# Patient Record
Sex: MALE | Race: BLACK OR AFRICAN AMERICAN | NOT HISPANIC OR LATINO | Employment: FULL TIME | URBAN - METROPOLITAN AREA
[De-identification: names, ages, dates, MRNs, and addresses within clinical notes are randomized per-mention and may not be internally consistent; named-entity substitution may affect disease eponyms.]

---

## 2023-02-23 LAB
ALBUMIN (MG/L) IN URINE: <7 MG/L
ALBUMIN/CREATININE (UG/MG) IN URINE: NORMAL UG/MG CRT (ref 0–30)
CREATININE (MG/DL) IN URINE: 35.5 MG/DL (ref 20–370)
CREATININE (MG/DL) IN URINE: 35.5 MG/DL (ref 20–370)
PROTEIN (MG/DL) IN URINE: <4 MG/DL (ref 5–25)
PROTEIN/CREATININE (MG/MG) IN URINE: ABNORMAL MG/MG CREAT (ref 0–0.17)

## 2023-03-06 ENCOUNTER — DOCUMENTATION (OUTPATIENT)
Dept: CARE COORDINATION | Facility: CLINIC | Age: 58
End: 2023-03-06
Payer: COMMERCIAL

## 2023-03-07 ENCOUNTER — DOCUMENTATION (OUTPATIENT)
Dept: CARE COORDINATION | Facility: CLINIC | Age: 58
End: 2023-03-07
Payer: COMMERCIAL

## 2023-03-07 ENCOUNTER — PATIENT OUTREACH (OUTPATIENT)
Dept: CARE COORDINATION | Facility: CLINIC | Age: 58
End: 2023-03-07
Payer: COMMERCIAL

## 2023-03-13 LAB
ALANINE AMINOTRANSFERASE (SGPT) (U/L) IN SER/PLAS: 19 U/L (ref 10–52)
ALBUMIN (G/DL) IN SER/PLAS: 3.6 G/DL (ref 3.4–5)
ALKALINE PHOSPHATASE (U/L) IN SER/PLAS: 65 U/L (ref 33–120)
ANION GAP IN SER/PLAS: 14 MMOL/L (ref 10–20)
ASPARTATE AMINOTRANSFERASE (SGOT) (U/L) IN SER/PLAS: 24 U/L (ref 9–39)
BASOPHILS (10*3/UL) IN BLOOD BY AUTOMATED COUNT: 0.07 X10E9/L (ref 0–0.1)
BASOPHILS/100 LEUKOCYTES IN BLOOD BY AUTOMATED COUNT: 0.8 % (ref 0–2)
BILIRUBIN TOTAL (MG/DL) IN SER/PLAS: 0.3 MG/DL (ref 0–1.2)
C REACTIVE PROTEIN (MG/L) IN SER/PLAS: 8.19 MG/DL
CALCIUM (MG/DL) IN SER/PLAS: 9.5 MG/DL (ref 8.6–10.6)
CARBON DIOXIDE, TOTAL (MMOL/L) IN SER/PLAS: 33 MMOL/L (ref 21–32)
CHLORIDE (MMOL/L) IN SER/PLAS: 96 MMOL/L (ref 98–107)
CREATININE (MG/DL) IN SER/PLAS: 1.81 MG/DL (ref 0.5–1.3)
EOSINOPHILS (10*3/UL) IN BLOOD BY AUTOMATED COUNT: 0.18 X10E9/L (ref 0–0.7)
EOSINOPHILS/100 LEUKOCYTES IN BLOOD BY AUTOMATED COUNT: 2 % (ref 0–6)
ERYTHROCYTE DISTRIBUTION WIDTH (RATIO) BY AUTOMATED COUNT: 17.8 % (ref 11.5–14.5)
ERYTHROCYTE MEAN CORPUSCULAR HEMOGLOBIN CONCENTRATION (G/DL) BY AUTOMATED: 31.3 G/DL (ref 32–36)
ERYTHROCYTE MEAN CORPUSCULAR VOLUME (FL) BY AUTOMATED COUNT: 70 FL (ref 80–100)
ERYTHROCYTES (10*6/UL) IN BLOOD BY AUTOMATED COUNT: 4.1 X10E12/L (ref 4.5–5.9)
GFR MALE: 43 ML/MIN/1.73M2
GLUCOSE (MG/DL) IN SER/PLAS: 199 MG/DL (ref 74–99)
HEMATOCRIT (%) IN BLOOD BY AUTOMATED COUNT: 28.8 % (ref 41–52)
HEMOGLOBIN (G/DL) IN BLOOD: 9 G/DL (ref 13.5–17.5)
IMMATURE GRANULOCYTES/100 LEUKOCYTES IN BLOOD BY AUTOMATED COUNT: 1 % (ref 0–0.9)
LEUKOCYTES (10*3/UL) IN BLOOD BY AUTOMATED COUNT: 9.2 X10E9/L (ref 4.4–11.3)
LYMPHOCYTES (10*3/UL) IN BLOOD BY AUTOMATED COUNT: 2.42 X10E9/L (ref 1.2–4.8)
LYMPHOCYTES/100 LEUKOCYTES IN BLOOD BY AUTOMATED COUNT: 26.4 % (ref 13–44)
MONOCYTES (10*3/UL) IN BLOOD BY AUTOMATED COUNT: 0.62 X10E9/L (ref 0.1–1)
MONOCYTES/100 LEUKOCYTES IN BLOOD BY AUTOMATED COUNT: 6.8 % (ref 2–10)
NEUTROPHILS (10*3/UL) IN BLOOD BY AUTOMATED COUNT: 5.79 X10E9/L (ref 1.2–7.7)
NEUTROPHILS/100 LEUKOCYTES IN BLOOD BY AUTOMATED COUNT: 63 % (ref 40–80)
NRBC (PER 100 WBCS) BY AUTOMATED COUNT: 0 /100 WBC (ref 0–0)
PLATELETS (10*3/UL) IN BLOOD AUTOMATED COUNT: 507 X10E9/L (ref 150–450)
POTASSIUM (MMOL/L) IN SER/PLAS: 4.9 MMOL/L (ref 3.5–5.3)
PROTEIN TOTAL: 7.7 G/DL (ref 6.4–8.2)
SEDIMENTATION RATE, ERYTHROCYTE: >130 MM/H (ref 0–20)
SODIUM (MMOL/L) IN SER/PLAS: 138 MMOL/L (ref 136–145)
UREA NITROGEN (MG/DL) IN SER/PLAS: 72 MG/DL (ref 6–23)

## 2023-03-14 ENCOUNTER — PATIENT OUTREACH (OUTPATIENT)
Dept: CARE COORDINATION | Facility: CLINIC | Age: 58
End: 2023-03-14
Payer: COMMERCIAL

## 2023-03-14 PROBLEM — R05.9 COUGH: Status: ACTIVE | Noted: 2023-03-14

## 2023-03-14 PROBLEM — H52.4 BILATERAL PRESBYOPIA: Status: ACTIVE | Noted: 2023-03-14

## 2023-03-14 PROBLEM — L03.119 CELLULITIS OF FOOT: Status: ACTIVE | Noted: 2023-03-14

## 2023-03-14 PROBLEM — M54.42 LEFT-SIDED LOW BACK PAIN WITH LEFT-SIDED SCIATICA: Status: ACTIVE | Noted: 2023-03-14

## 2023-03-14 PROBLEM — R79.89 LOW VITAMIN D LEVEL: Status: ACTIVE | Noted: 2023-03-14

## 2023-03-14 PROBLEM — L97.919 ULCER OF RIGHT LEG (MULTI): Status: ACTIVE | Noted: 2023-03-14

## 2023-03-14 PROBLEM — E11.36 CATARACT ASSOCIATED WITH TYPE 2 DIABETES MELLITUS (MULTI): Status: ACTIVE | Noted: 2023-03-14

## 2023-03-14 PROBLEM — D57.3 SICKLE CELL TRAIT (CMS-HCC): Status: ACTIVE | Noted: 2023-03-14

## 2023-03-14 PROBLEM — E11.319 DIABETIC RETINOPATHY (MULTI): Status: ACTIVE | Noted: 2023-03-14

## 2023-03-14 PROBLEM — E78.6 LOW HDL (UNDER 40): Status: ACTIVE | Noted: 2023-03-14

## 2023-03-14 PROBLEM — I10 HTN (HYPERTENSION), BENIGN: Status: ACTIVE | Noted: 2023-03-14

## 2023-03-14 PROBLEM — I10 UNCONTROLLED HYPERTENSION: Status: ACTIVE | Noted: 2023-03-14

## 2023-03-14 PROBLEM — H52.203 ASTIGMATISM, BILATERAL: Status: ACTIVE | Noted: 2023-03-14

## 2023-03-14 PROBLEM — M86.9: Status: ACTIVE | Noted: 2023-03-14

## 2023-03-14 PROBLEM — R79.89 PRERENAL AZOTEMIA: Status: ACTIVE | Noted: 2023-03-14

## 2023-03-14 PROBLEM — J34.89 NASAL CONGESTION WITH RHINORRHEA: Status: ACTIVE | Noted: 2023-03-14

## 2023-03-14 PROBLEM — E78.5 HYPERLIPIDEMIA: Status: ACTIVE | Noted: 2023-03-14

## 2023-03-14 PROBLEM — B35.1 ONYCHOMYCOSIS OF TOENAIL: Status: ACTIVE | Noted: 2023-03-14

## 2023-03-14 PROBLEM — K43.2 INCISIONAL HERNIA: Status: ACTIVE | Noted: 2023-03-14

## 2023-03-14 PROBLEM — L89.899: Status: ACTIVE | Noted: 2023-03-14

## 2023-03-14 PROBLEM — E11.9 DIABETES MELLITUS TYPE 2 WITHOUT RETINOPATHY (MULTI): Status: ACTIVE | Noted: 2023-03-14

## 2023-03-14 PROBLEM — I73.9 PERIPHERAL VASCULAR DISEASE (CMS-HCC): Status: ACTIVE | Noted: 2023-03-14

## 2023-03-14 PROBLEM — Z86.39 HISTORY OF MORBID OBESITY: Status: ACTIVE | Noted: 2023-03-14

## 2023-03-14 PROBLEM — M10.9 GOUT: Status: ACTIVE | Noted: 2023-03-14

## 2023-03-14 PROBLEM — R09.81 NASAL CONGESTION WITH RHINORRHEA: Status: ACTIVE | Noted: 2023-03-14

## 2023-03-14 PROBLEM — H25.13 NUCLEAR SCLEROSIS OF BOTH EYES: Status: ACTIVE | Noted: 2023-03-14

## 2023-03-14 PROBLEM — I10 UNCONTROLLED HYPERTENSION: Status: RESOLVED | Noted: 2023-03-14 | Resolved: 2023-03-14

## 2023-03-14 PROBLEM — R79.89 ELEVATED PARATHYROID HORMONE: Status: ACTIVE | Noted: 2023-03-14

## 2023-03-14 PROBLEM — G47.33 OBSTRUCTIVE SLEEP APNEA: Status: ACTIVE | Noted: 2023-03-14

## 2023-03-14 PROBLEM — M19.90 ARTHRITIS: Status: ACTIVE | Noted: 2023-03-14

## 2023-03-14 PROBLEM — I50.30 (HFPEF) HEART FAILURE WITH PRESERVED EJECTION FRACTION (MULTI): Status: ACTIVE | Noted: 2023-03-14

## 2023-03-14 PROBLEM — N18.30 STAGE III CHRONIC KIDNEY DISEASE (MULTI): Status: ACTIVE | Noted: 2023-03-14

## 2023-03-14 PROBLEM — H52.7 REFRACTION ERROR: Status: ACTIVE | Noted: 2023-03-14

## 2023-03-14 RX ORDER — ISOSORBIDE DINITRATE 10 MG/1
10 TABLET ORAL 2 TIMES DAILY
COMMUNITY
Start: 2018-05-30 | End: 2023-12-07 | Stop reason: SDUPTHER

## 2023-03-14 RX ORDER — BUMETANIDE 2 MG/1
2 TABLET ORAL 2 TIMES DAILY
COMMUNITY
End: 2023-06-15 | Stop reason: ALTCHOICE

## 2023-03-14 RX ORDER — DAPAGLIFLOZIN 10 MG/1
1 TABLET, FILM COATED ORAL EVERY MORNING
COMMUNITY
End: 2023-06-15 | Stop reason: ALTCHOICE

## 2023-03-14 RX ORDER — INSULIN DEGLUDEC 100 U/ML
20 INJECTION, SOLUTION SUBCUTANEOUS DAILY
COMMUNITY
End: 2023-10-09 | Stop reason: ALTCHOICE

## 2023-03-14 RX ORDER — LANCETS
EACH MISCELLANEOUS
COMMUNITY
End: 2023-12-07 | Stop reason: SDUPTHER

## 2023-03-14 RX ORDER — IBUPROFEN 100 MG/5ML
1000 SUSPENSION, ORAL (FINAL DOSE FORM) ORAL DAILY
COMMUNITY

## 2023-03-14 RX ORDER — PEN NEEDLE, DIABETIC 30 GX3/16"
NEEDLE, DISPOSABLE MISCELLANEOUS
COMMUNITY
End: 2023-10-09 | Stop reason: ALTCHOICE

## 2023-03-14 RX ORDER — GABAPENTIN 100 MG/1
100 CAPSULE ORAL 3 TIMES DAILY
COMMUNITY
End: 2023-12-07 | Stop reason: ALTCHOICE

## 2023-03-14 RX ORDER — LISINOPRIL 40 MG/1
1 TABLET ORAL 2 TIMES DAILY
COMMUNITY
Start: 2014-06-05 | End: 2023-06-15 | Stop reason: ALTCHOICE

## 2023-03-14 RX ORDER — CHLORTHALIDONE 25 MG/1
25 TABLET ORAL DAILY
COMMUNITY
Start: 2021-08-20 | End: 2023-06-15 | Stop reason: ALTCHOICE

## 2023-03-14 RX ORDER — HYDRALAZINE HYDROCHLORIDE 100 MG/1
1 TABLET, FILM COATED ORAL 2 TIMES DAILY
COMMUNITY
Start: 2021-10-06 | End: 2023-06-15 | Stop reason: ALTCHOICE

## 2023-03-14 RX ORDER — GLYBURIDE 5 MG/1
10 TABLET ORAL
COMMUNITY
Start: 2014-01-05 | End: 2023-12-07 | Stop reason: SDUPTHER

## 2023-03-14 RX ORDER — ACETAMINOPHEN 500 MG
50 TABLET ORAL DAILY
COMMUNITY
Start: 2022-03-10 | End: 2023-06-15 | Stop reason: ALTCHOICE

## 2023-03-14 RX ORDER — DULAGLUTIDE 1.5 MG/.5ML
INJECTION, SOLUTION SUBCUTANEOUS
COMMUNITY
Start: 2019-08-28 | End: 2023-06-15 | Stop reason: ALTCHOICE

## 2023-03-14 RX ORDER — BLOOD SUGAR DIAGNOSTIC
STRIP MISCELLANEOUS
COMMUNITY
Start: 2019-08-27 | End: 2023-12-07 | Stop reason: SDUPTHER

## 2023-03-14 RX ORDER — ALLOPURINOL 300 MG/1
300 TABLET ORAL DAILY
COMMUNITY
End: 2023-06-15 | Stop reason: ALTCHOICE

## 2023-03-14 RX ORDER — FERROUS SULFATE 325(65) MG
3 TABLET ORAL DAILY
COMMUNITY
Start: 2016-10-18 | End: 2023-06-15 | Stop reason: ALTCHOICE

## 2023-03-14 RX ORDER — AMLODIPINE BESYLATE 10 MG/1
10 TABLET ORAL 2 TIMES DAILY
COMMUNITY
End: 2023-06-15 | Stop reason: ALTCHOICE

## 2023-03-15 ENCOUNTER — PATIENT OUTREACH (OUTPATIENT)
Dept: CARE COORDINATION | Facility: CLINIC | Age: 58
End: 2023-03-15
Payer: COMMERCIAL

## 2023-03-16 ENCOUNTER — OFFICE VISIT (OUTPATIENT)
Dept: PRIMARY CARE | Facility: CLINIC | Age: 58
End: 2023-03-16
Payer: COMMERCIAL

## 2023-03-16 VITALS
HEART RATE: 62 BPM | SYSTOLIC BLOOD PRESSURE: 120 MMHG | HEIGHT: 72 IN | WEIGHT: 315 LBS | DIASTOLIC BLOOD PRESSURE: 74 MMHG | OXYGEN SATURATION: 97 % | BODY MASS INDEX: 42.66 KG/M2

## 2023-03-16 DIAGNOSIS — M86.9 OSTEOMYELITIS OF LEFT FOOT, UNSPECIFIED TYPE (MULTI): ICD-10-CM

## 2023-03-16 DIAGNOSIS — N17.9 AKI (ACUTE KIDNEY INJURY) (CMS-HCC): ICD-10-CM

## 2023-03-16 DIAGNOSIS — I50.32 CHRONIC HEART FAILURE WITH PRESERVED EJECTION FRACTION (MULTI): ICD-10-CM

## 2023-03-16 DIAGNOSIS — R29.898 WEAKNESS OF BOTH LOWER EXTREMITIES: Primary | ICD-10-CM

## 2023-03-16 DIAGNOSIS — E11.9 DIABETES MELLITUS TYPE 2 WITHOUT RETINOPATHY (MULTI): ICD-10-CM

## 2023-03-16 PROCEDURE — 3078F DIAST BP <80 MM HG: CPT | Performed by: STUDENT IN AN ORGANIZED HEALTH CARE EDUCATION/TRAINING PROGRAM

## 2023-03-16 PROCEDURE — 4010F ACE/ARB THERAPY RXD/TAKEN: CPT | Performed by: STUDENT IN AN ORGANIZED HEALTH CARE EDUCATION/TRAINING PROGRAM

## 2023-03-16 PROCEDURE — 99495 TRANSJ CARE MGMT MOD F2F 14D: CPT | Performed by: STUDENT IN AN ORGANIZED HEALTH CARE EDUCATION/TRAINING PROGRAM

## 2023-03-16 PROCEDURE — 3066F NEPHROPATHY DOC TX: CPT | Performed by: STUDENT IN AN ORGANIZED HEALTH CARE EDUCATION/TRAINING PROGRAM

## 2023-03-16 PROCEDURE — 3046F HEMOGLOBIN A1C LEVEL >9.0%: CPT | Performed by: STUDENT IN AN ORGANIZED HEALTH CARE EDUCATION/TRAINING PROGRAM

## 2023-03-16 PROCEDURE — 1036F TOBACCO NON-USER: CPT | Performed by: STUDENT IN AN ORGANIZED HEALTH CARE EDUCATION/TRAINING PROGRAM

## 2023-03-16 PROCEDURE — 3008F BODY MASS INDEX DOCD: CPT | Performed by: STUDENT IN AN ORGANIZED HEALTH CARE EDUCATION/TRAINING PROGRAM

## 2023-03-16 PROCEDURE — 3074F SYST BP LT 130 MM HG: CPT | Performed by: STUDENT IN AN ORGANIZED HEALTH CARE EDUCATION/TRAINING PROGRAM

## 2023-03-16 ASSESSMENT — PATIENT HEALTH QUESTIONNAIRE - PHQ9
SUM OF ALL RESPONSES TO PHQ9 QUESTIONS 1 AND 2: 0
2. FEELING DOWN, DEPRESSED OR HOPELESS: NOT AT ALL
1. LITTLE INTEREST OR PLEASURE IN DOING THINGS: NOT AT ALL

## 2023-03-16 ASSESSMENT — ENCOUNTER SYMPTOMS
LOSS OF SENSATION IN FEET: 0
OCCASIONAL FEELINGS OF UNSTEADINESS: 0
DEPRESSION: 0

## 2023-03-16 ASSESSMENT — PAIN SCALES - GENERAL: PAINLEVEL: 10-WORST PAIN EVER

## 2023-03-16 NOTE — PROGRESS NOTES
"57-year-old male presenting for hospital follow-up.  Admission date 2/24/2023, discharge 3/5/2023.    \"Hospital Course:   Patient is a 57-year-old gentleman presented to the hospital on February 24   with concerns of left foot diabetic infection.  Patient was admitted to the   hospital started on empiric IV antibiotic therapy patient was found to have   left diabetic foot ulcer with cellulitis with group B strep along with left   calcaneal osteomyelitis infectious disease and podiatry were both on consult.   Patient's blood cultures also came back positive from admission on February 24   with group B strep source being the foot.  Patient's repeat blood cultures have   been negative to date.  Patient had a krystal catheter placed on March 3 for IV   antibiotic therapy patient is being discharged on IV ceftriaxone 2 g to 24   hours until April 11, will have weekly lab work as recommended by infectious   disease and they will follow-up with the results.  On March 2 and had incision   and drainage and bone biopsy of the left heel.  Patient will follow-up in the   wound care center in 1 week as instructed by podiatry.  In regards to patient's   uncontrolled diabetes endocrinology was on board, patient's Tresiba dose was   increased to 44 units at bedtime, along with lispro 10 units 3 times daily AC,   along with addition of sliding scale insulin at home.  Patient can follow-up   with endocrinology he is to also go back on his home dose of Trulicity.   Patient was stable at the time of discharge instructions were provided to the   wife at bedside as well.  Was also seen by cardiology patient had TROY on CKD 3   as well as compensated diastolic heart failure, they did add metoprolol.  For   the time being we did discontinue patient's lisinopril can be resumed as an   outpatient per the PCP.  Patient was stable at the time of discharge. \"      Patient having continued weakness in lower extremities.  Has not had PT/OT follow-up. "  Not having systemic symptoms, not feeling feverish.  Stable with catheter and IV antibiotics.  Reports not having made ID appointment in outpatient, but would like to.  Has not made endocrinology follow-up appointment at this time, but plans to, as he is established.  Patient is taking insulin regimen as started in hospital, home blood sugar readings mostly between 120 and 220.  Tolerating regimen well.    General: Alert, oriented, pleasant, in no acute distress  HEENT:      Head: normocephalic, atraumatic;      eyes: EOMI, no scleral icterus;   CV: Heart with regular rate and rhythm, normal S1/S2, no murmurs  Lungs: CTAB without wheezing, rhonchi or rales; good respiratory effort, no increased work of breathing  Abdomen: soft, non-tender, non-distended, no masses appreciated  Extremities: no edema, no cyanosis  Neuro: Cranial nerves grossly intact; alert and oriented  Psych: Appropriate mood and affect    1.  Osteomyelitis  -Vital signs stable  -Continue to follow with wound care/podiatry, help to make an appointment with ID, continue IV antibiotics    2.  DMII  -Continue current basal bolus insulin regimen  -Continue with endocrinology follow-up in the next 2 weeks    3.  TROY  -Repeat renal function    Temporary disability forms filled out    Follow-up 12 weeks    Christopher D'Amico, DO

## 2023-03-17 ENCOUNTER — TELEPHONE (OUTPATIENT)
Dept: PRIMARY CARE | Facility: CLINIC | Age: 58
End: 2023-03-17
Payer: COMMERCIAL

## 2023-03-17 DIAGNOSIS — R29.898 WEAKNESS OF BOTH LOWER EXTREMITIES: Primary | ICD-10-CM

## 2023-03-21 LAB
BASOPHILS (10*3/UL) IN BLOOD BY AUTOMATED COUNT: 0.05 X10E9/L (ref 0–0.1)
BASOPHILS/100 LEUKOCYTES IN BLOOD BY AUTOMATED COUNT: 0.5 % (ref 0–2)
C REACTIVE PROTEIN (MG/L) IN SER/PLAS: 2.43 MG/DL
EOSINOPHILS (10*3/UL) IN BLOOD BY AUTOMATED COUNT: 0.25 X10E9/L (ref 0–0.7)
EOSINOPHILS/100 LEUKOCYTES IN BLOOD BY AUTOMATED COUNT: 2.7 % (ref 0–6)
ERYTHROCYTE DISTRIBUTION WIDTH (RATIO) BY AUTOMATED COUNT: 18 % (ref 11.5–14.5)
ERYTHROCYTE MEAN CORPUSCULAR HEMOGLOBIN CONCENTRATION (G/DL) BY AUTOMATED: 31 G/DL (ref 32–36)
ERYTHROCYTE MEAN CORPUSCULAR VOLUME (FL) BY AUTOMATED COUNT: 72 FL (ref 80–100)
ERYTHROCYTES (10*6/UL) IN BLOOD BY AUTOMATED COUNT: 4.31 X10E12/L (ref 4.5–5.9)
HEMATOCRIT (%) IN BLOOD BY AUTOMATED COUNT: 31 % (ref 41–52)
HEMOGLOBIN (G/DL) IN BLOOD: 9.6 G/DL (ref 13.5–17.5)
IMMATURE GRANULOCYTES/100 LEUKOCYTES IN BLOOD BY AUTOMATED COUNT: 0.4 % (ref 0–0.9)
LEUKOCYTES (10*3/UL) IN BLOOD BY AUTOMATED COUNT: 9.3 X10E9/L (ref 4.4–11.3)
LYMPHOCYTES (10*3/UL) IN BLOOD BY AUTOMATED COUNT: 1.91 X10E9/L (ref 1.2–4.8)
LYMPHOCYTES/100 LEUKOCYTES IN BLOOD BY AUTOMATED COUNT: 20.5 % (ref 13–44)
MONOCYTES (10*3/UL) IN BLOOD BY AUTOMATED COUNT: 0.68 X10E9/L (ref 0.1–1)
MONOCYTES/100 LEUKOCYTES IN BLOOD BY AUTOMATED COUNT: 7.3 % (ref 2–10)
NEUTROPHILS (10*3/UL) IN BLOOD BY AUTOMATED COUNT: 6.4 X10E9/L (ref 1.2–7.7)
NEUTROPHILS/100 LEUKOCYTES IN BLOOD BY AUTOMATED COUNT: 68.6 % (ref 40–80)
NRBC (PER 100 WBCS) BY AUTOMATED COUNT: 0 /100 WBC (ref 0–0)
PLATELETS (10*3/UL) IN BLOOD AUTOMATED COUNT: 408 X10E9/L (ref 150–450)
SEDIMENTATION RATE, ERYTHROCYTE: >130 MM/H (ref 0–20)

## 2023-03-27 LAB
ALANINE AMINOTRANSFERASE (SGPT) (U/L) IN SER/PLAS: 9 U/L (ref 10–52)
ALBUMIN (G/DL) IN SER/PLAS: 3.8 G/DL (ref 3.4–5)
ALKALINE PHOSPHATASE (U/L) IN SER/PLAS: 58 U/L (ref 33–120)
ANION GAP IN SER/PLAS: 16 MMOL/L (ref 10–20)
ASPARTATE AMINOTRANSFERASE (SGOT) (U/L) IN SER/PLAS: 15 U/L (ref 9–39)
BASOPHILS (10*3/UL) IN BLOOD BY AUTOMATED COUNT: 0.05 X10E9/L (ref 0–0.1)
BASOPHILS/100 LEUKOCYTES IN BLOOD BY AUTOMATED COUNT: 0.6 % (ref 0–2)
BILIRUBIN TOTAL (MG/DL) IN SER/PLAS: 0.4 MG/DL (ref 0–1.2)
C REACTIVE PROTEIN (MG/L) IN SER/PLAS: 3.93 MG/DL
CALCIUM (MG/DL) IN SER/PLAS: 8.7 MG/DL (ref 8.6–10.3)
CARBON DIOXIDE, TOTAL (MMOL/L) IN SER/PLAS: 29 MMOL/L (ref 21–32)
CHLORIDE (MMOL/L) IN SER/PLAS: 99 MMOL/L (ref 98–107)
CREATININE (MG/DL) IN SER/PLAS: 1.55 MG/DL (ref 0.5–1.3)
EOSINOPHILS (10*3/UL) IN BLOOD BY AUTOMATED COUNT: 0.58 X10E9/L (ref 0–0.7)
EOSINOPHILS/100 LEUKOCYTES IN BLOOD BY AUTOMATED COUNT: 6.6 % (ref 0–6)
ERYTHROCYTE DISTRIBUTION WIDTH (RATIO) BY AUTOMATED COUNT: 18.4 % (ref 11.5–14.5)
ERYTHROCYTE MEAN CORPUSCULAR HEMOGLOBIN CONCENTRATION (G/DL) BY AUTOMATED: 30.7 G/DL (ref 32–36)
ERYTHROCYTE MEAN CORPUSCULAR VOLUME (FL) BY AUTOMATED COUNT: 71 FL (ref 80–100)
ERYTHROCYTES (10*6/UL) IN BLOOD BY AUTOMATED COUNT: 4.47 X10E12/L (ref 4.5–5.9)
GFR MALE: 52 ML/MIN/1.73M2
GLUCOSE (MG/DL) IN SER/PLAS: 184 MG/DL (ref 74–99)
HEMATOCRIT (%) IN BLOOD BY AUTOMATED COUNT: 31.9 % (ref 41–52)
HEMOGLOBIN (G/DL) IN BLOOD: 9.8 G/DL (ref 13.5–17.5)
IMMATURE GRANULOCYTES/100 LEUKOCYTES IN BLOOD BY AUTOMATED COUNT: 0.5 % (ref 0–0.9)
LEUKOCYTES (10*3/UL) IN BLOOD BY AUTOMATED COUNT: 8.8 X10E9/L (ref 4.4–11.3)
LYMPHOCYTES (10*3/UL) IN BLOOD BY AUTOMATED COUNT: 1.97 X10E9/L (ref 1.2–4.8)
LYMPHOCYTES/100 LEUKOCYTES IN BLOOD BY AUTOMATED COUNT: 22.3 % (ref 13–44)
MONOCYTES (10*3/UL) IN BLOOD BY AUTOMATED COUNT: 0.69 X10E9/L (ref 0.1–1)
MONOCYTES/100 LEUKOCYTES IN BLOOD BY AUTOMATED COUNT: 7.8 % (ref 2–10)
NEUTROPHILS (10*3/UL) IN BLOOD BY AUTOMATED COUNT: 5.5 X10E9/L (ref 1.2–7.7)
NEUTROPHILS/100 LEUKOCYTES IN BLOOD BY AUTOMATED COUNT: 62.2 % (ref 40–80)
PLATELETS (10*3/UL) IN BLOOD AUTOMATED COUNT: 383 X10E9/L (ref 150–450)
POTASSIUM (MMOL/L) IN SER/PLAS: 4.3 MMOL/L (ref 3.5–5.3)
PROTEIN TOTAL: 7.5 G/DL (ref 6.4–8.2)
SEDIMENTATION RATE, ERYTHROCYTE: >130 MM/H (ref 0–20)
SODIUM (MMOL/L) IN SER/PLAS: 140 MMOL/L (ref 136–145)
UREA NITROGEN (MG/DL) IN SER/PLAS: 49 MG/DL (ref 6–23)

## 2023-04-04 LAB
BASOPHILS (10*3/UL) IN BLOOD BY AUTOMATED COUNT: 0.07 X10E9/L (ref 0–0.1)
BASOPHILS/100 LEUKOCYTES IN BLOOD BY AUTOMATED COUNT: 0.7 % (ref 0–2)
C REACTIVE PROTEIN (MG/L) IN SER/PLAS: 4.06 MG/DL
EOSINOPHILS (10*3/UL) IN BLOOD BY AUTOMATED COUNT: 0.36 X10E9/L (ref 0–0.7)
EOSINOPHILS/100 LEUKOCYTES IN BLOOD BY AUTOMATED COUNT: 3.7 % (ref 0–6)
ERYTHROCYTE DISTRIBUTION WIDTH (RATIO) BY AUTOMATED COUNT: 18.7 % (ref 11.5–14.5)
ERYTHROCYTE MEAN CORPUSCULAR HEMOGLOBIN CONCENTRATION (G/DL) BY AUTOMATED: 31.2 G/DL (ref 32–36)
ERYTHROCYTE MEAN CORPUSCULAR VOLUME (FL) BY AUTOMATED COUNT: 71 FL (ref 80–100)
ERYTHROCYTES (10*6/UL) IN BLOOD BY AUTOMATED COUNT: 4.45 X10E12/L (ref 4.5–5.9)
HEMATOCRIT (%) IN BLOOD BY AUTOMATED COUNT: 31.4 % (ref 41–52)
HEMOGLOBIN (G/DL) IN BLOOD: 9.8 G/DL (ref 13.5–17.5)
IMMATURE GRANULOCYTES/100 LEUKOCYTES IN BLOOD BY AUTOMATED COUNT: 0.6 % (ref 0–0.9)
LEUKOCYTES (10*3/UL) IN BLOOD BY AUTOMATED COUNT: 9.8 X10E9/L (ref 4.4–11.3)
LYMPHOCYTES (10*3/UL) IN BLOOD BY AUTOMATED COUNT: 2.22 X10E9/L (ref 1.2–4.8)
LYMPHOCYTES/100 LEUKOCYTES IN BLOOD BY AUTOMATED COUNT: 22.7 % (ref 13–44)
MONOCYTES (10*3/UL) IN BLOOD BY AUTOMATED COUNT: 0.77 X10E9/L (ref 0.1–1)
MONOCYTES/100 LEUKOCYTES IN BLOOD BY AUTOMATED COUNT: 7.9 % (ref 2–10)
NEUTROPHILS (10*3/UL) IN BLOOD BY AUTOMATED COUNT: 6.29 X10E9/L (ref 1.2–7.7)
NEUTROPHILS/100 LEUKOCYTES IN BLOOD BY AUTOMATED COUNT: 64.4 % (ref 40–80)
NRBC (PER 100 WBCS) BY AUTOMATED COUNT: 0 /100 WBC (ref 0–0)
PLATELETS (10*3/UL) IN BLOOD AUTOMATED COUNT: 412 X10E9/L (ref 150–450)
SEDIMENTATION RATE, ERYTHROCYTE: >130 MM/H (ref 0–20)

## 2023-04-11 LAB
ALANINE AMINOTRANSFERASE (SGPT) (U/L) IN SER/PLAS: 10 U/L (ref 10–52)
ALBUMIN (G/DL) IN SER/PLAS: 4 G/DL (ref 3.4–5)
ALKALINE PHOSPHATASE (U/L) IN SER/PLAS: 61 U/L (ref 33–120)
ANION GAP IN SER/PLAS: 16 MMOL/L (ref 10–20)
ASPARTATE AMINOTRANSFERASE (SGOT) (U/L) IN SER/PLAS: 14 U/L (ref 9–39)
BASOPHILS (10*3/UL) IN BLOOD BY AUTOMATED COUNT: 0.07 X10E9/L (ref 0–0.1)
BASOPHILS/100 LEUKOCYTES IN BLOOD BY AUTOMATED COUNT: 0.7 % (ref 0–2)
BILIRUBIN TOTAL (MG/DL) IN SER/PLAS: 0.4 MG/DL (ref 0–1.2)
C REACTIVE PROTEIN (MG/L) IN SER/PLAS: 3.32 MG/DL
CALCIUM (MG/DL) IN SER/PLAS: 9.4 MG/DL (ref 8.6–10.6)
CARBON DIOXIDE, TOTAL (MMOL/L) IN SER/PLAS: 28 MMOL/L (ref 21–32)
CHLORIDE (MMOL/L) IN SER/PLAS: 100 MMOL/L (ref 98–107)
CREATININE (MG/DL) IN SER/PLAS: 1.65 MG/DL (ref 0.5–1.3)
EOSINOPHILS (10*3/UL) IN BLOOD BY AUTOMATED COUNT: 0.3 X10E9/L (ref 0–0.7)
EOSINOPHILS/100 LEUKOCYTES IN BLOOD BY AUTOMATED COUNT: 3 % (ref 0–6)
ERYTHROCYTE DISTRIBUTION WIDTH (RATIO) BY AUTOMATED COUNT: 18.6 % (ref 11.5–14.5)
ERYTHROCYTE MEAN CORPUSCULAR HEMOGLOBIN CONCENTRATION (G/DL) BY AUTOMATED: 31.6 G/DL (ref 32–36)
ERYTHROCYTE MEAN CORPUSCULAR VOLUME (FL) BY AUTOMATED COUNT: 70 FL (ref 80–100)
ERYTHROCYTES (10*6/UL) IN BLOOD BY AUTOMATED COUNT: 4.55 X10E12/L (ref 4.5–5.9)
GFR MALE: 48 ML/MIN/1.73M2
GLUCOSE (MG/DL) IN SER/PLAS: 138 MG/DL (ref 74–99)
HEMATOCRIT (%) IN BLOOD BY AUTOMATED COUNT: 32 % (ref 41–52)
HEMOGLOBIN (G/DL) IN BLOOD: 10.1 G/DL (ref 13.5–17.5)
IMMATURE GRANULOCYTES/100 LEUKOCYTES IN BLOOD BY AUTOMATED COUNT: 0.8 % (ref 0–0.9)
LEUKOCYTES (10*3/UL) IN BLOOD BY AUTOMATED COUNT: 10.1 X10E9/L (ref 4.4–11.3)
LYMPHOCYTES (10*3/UL) IN BLOOD BY AUTOMATED COUNT: 2.12 X10E9/L (ref 1.2–4.8)
LYMPHOCYTES/100 LEUKOCYTES IN BLOOD BY AUTOMATED COUNT: 20.9 % (ref 13–44)
MONOCYTES (10*3/UL) IN BLOOD BY AUTOMATED COUNT: 0.79 X10E9/L (ref 0.1–1)
MONOCYTES/100 LEUKOCYTES IN BLOOD BY AUTOMATED COUNT: 7.8 % (ref 2–10)
NEUTROPHILS (10*3/UL) IN BLOOD BY AUTOMATED COUNT: 6.76 X10E9/L (ref 1.2–7.7)
NEUTROPHILS/100 LEUKOCYTES IN BLOOD BY AUTOMATED COUNT: 66.8 % (ref 40–80)
NRBC (PER 100 WBCS) BY AUTOMATED COUNT: 0 /100 WBC (ref 0–0)
PLATELETS (10*3/UL) IN BLOOD AUTOMATED COUNT: 355 X10E9/L (ref 150–450)
POTASSIUM (MMOL/L) IN SER/PLAS: 5.5 MMOL/L (ref 3.5–5.3)
PROTEIN TOTAL: 7.9 G/DL (ref 6.4–8.2)
SEDIMENTATION RATE, ERYTHROCYTE: 122 MM/H (ref 0–20)
SODIUM (MMOL/L) IN SER/PLAS: 138 MMOL/L (ref 136–145)
UREA NITROGEN (MG/DL) IN SER/PLAS: 64 MG/DL (ref 6–23)

## 2023-04-12 ENCOUNTER — TELEPHONE (OUTPATIENT)
Dept: PRIMARY CARE | Facility: CLINIC | Age: 58
End: 2023-04-12
Payer: COMMERCIAL

## 2023-04-12 ENCOUNTER — HOSPITAL ENCOUNTER (OUTPATIENT)
Dept: DATA CONVERSION | Facility: HOSPITAL | Age: 58
End: 2023-04-12
Attending: RADIOLOGY | Admitting: RADIOLOGY
Payer: COMMERCIAL

## 2023-04-12 DIAGNOSIS — M86.172 OTHER ACUTE OSTEOMYELITIS, LEFT ANKLE AND FOOT (MULTI): ICD-10-CM

## 2023-04-12 DIAGNOSIS — Z79.4 LONG TERM (CURRENT) USE OF INSULIN (MULTI): ICD-10-CM

## 2023-04-12 DIAGNOSIS — Z79.85 LONG-TERM (CURRENT) USE OF INJECTABLE NON-INSULIN ANTIDIABETIC DRUGS: ICD-10-CM

## 2023-04-12 DIAGNOSIS — Z79.84 LONG TERM (CURRENT) USE OF ORAL HYPOGLYCEMIC DRUGS: ICD-10-CM

## 2023-04-12 DIAGNOSIS — Z45.2 ENCOUNTER FOR ADJUSTMENT AND MANAGEMENT OF VASCULAR ACCESS DEVICE: ICD-10-CM

## 2023-04-13 ENCOUNTER — PATIENT OUTREACH (OUTPATIENT)
Dept: CARE COORDINATION | Facility: CLINIC | Age: 58
End: 2023-04-13
Payer: COMMERCIAL

## 2023-04-13 DIAGNOSIS — M54.40 LOW BACK PAIN WITH SCIATICA, SCIATICA LATERALITY UNSPECIFIED, UNSPECIFIED BACK PAIN LATERALITY, UNSPECIFIED CHRONICITY: ICD-10-CM

## 2023-04-13 DIAGNOSIS — M54.42 LEFT-SIDED LOW BACK PAIN WITH LEFT-SIDED SCIATICA, UNSPECIFIED CHRONICITY: Primary | ICD-10-CM

## 2023-04-17 ENCOUNTER — DOCUMENTATION (OUTPATIENT)
Dept: PRIMARY CARE | Facility: CLINIC | Age: 58
End: 2023-04-17
Payer: COMMERCIAL

## 2023-04-24 ENCOUNTER — LAB (OUTPATIENT)
Dept: LAB | Facility: LAB | Age: 58
End: 2023-04-24
Payer: COMMERCIAL

## 2023-04-24 LAB — TOBACCO SCREEN, URINE: NEGATIVE

## 2023-05-16 ENCOUNTER — PATIENT OUTREACH (OUTPATIENT)
Dept: CARE COORDINATION | Facility: CLINIC | Age: 58
End: 2023-05-16
Payer: COMMERCIAL

## 2023-05-17 ENCOUNTER — PATIENT OUTREACH (OUTPATIENT)
Dept: CARE COORDINATION | Facility: CLINIC | Age: 58
End: 2023-05-17
Payer: COMMERCIAL

## 2023-06-12 PROBLEM — E11.9 TYPE 2 DIABETES MELLITUS (MULTI): Status: ACTIVE | Noted: 2023-02-24

## 2023-06-12 PROBLEM — U07.1 COVID-19: Status: ACTIVE | Noted: 2023-06-12

## 2023-06-12 PROBLEM — M54.50 LOW BACK PAIN: Status: ACTIVE | Noted: 2023-02-24

## 2023-06-12 PROBLEM — Z79.4 INSULIN LONG-TERM USE (MULTI): Status: ACTIVE | Noted: 2023-02-24

## 2023-06-12 PROBLEM — R69 SICK: Status: ACTIVE | Noted: 2023-06-12

## 2023-06-12 PROBLEM — E11.29 TYPE 2 DIABETES MELLITUS WITH RENAL COMPLICATION (MULTI): Status: ACTIVE | Noted: 2023-02-24

## 2023-06-12 PROBLEM — E66.01 MORBID OBESITY DUE TO EXCESS CALORIES (MULTI): Status: ACTIVE | Noted: 2023-02-24

## 2023-06-12 PROBLEM — L97.429: Status: ACTIVE | Noted: 2023-02-24

## 2023-06-12 PROBLEM — Z87.891 PERSONAL HISTORY OF NICOTINE DEPENDENCE: Status: ACTIVE | Noted: 2023-02-24

## 2023-06-12 PROBLEM — G47.33 OBSTRUCTIVE SLEEP APNEA (ADULT) (PEDIATRIC): Status: ACTIVE | Noted: 2023-02-24

## 2023-06-12 PROBLEM — M86.9 OSTEOMYELITIS (MULTI): Status: ACTIVE | Noted: 2023-02-24

## 2023-06-12 PROBLEM — L03.116 CELLULITIS OF LEFT LOWER LIMB: Status: ACTIVE | Noted: 2023-02-24

## 2023-06-12 PROBLEM — N18.30 STAGE 3 CHRONIC KIDNEY DISEASE (MULTI): Status: ACTIVE | Noted: 2023-02-24

## 2023-06-12 PROBLEM — Z45.2 ENCOUNTER FOR ADJUSTMENT AND MANAGEMENT OF VASCULAR ACCESS DEVICE: Status: ACTIVE | Noted: 2023-02-24

## 2023-06-12 PROBLEM — E11.69 TYPE 2 DIABETES MELLITUS WITH OTHER SPECIFIED COMPLICATION (MULTI): Status: ACTIVE | Noted: 2023-06-12

## 2023-06-12 PROBLEM — G89.29 OTHER CHRONIC PAIN: Status: ACTIVE | Noted: 2023-02-24

## 2023-06-12 PROBLEM — M86.172 ACUTE OSTEOMYELITIS OF LEFT CALCANEUS (MULTI): Status: ACTIVE | Noted: 2023-04-04

## 2023-06-12 PROBLEM — E11.9 DIABETES MELLITUS (MULTI): Status: ACTIVE | Noted: 2023-02-24

## 2023-06-12 PROBLEM — E78.5 HYPERLIPIDEMIA, UNSPECIFIED: Status: ACTIVE | Noted: 2023-02-24

## 2023-06-12 PROBLEM — M62.81 MUSCLE WEAKNESS (GENERALIZED): Status: ACTIVE | Noted: 2023-02-24

## 2023-06-12 PROBLEM — A49.1 STREPTOCOCCUS INFECTION, GROUP B: Status: ACTIVE | Noted: 2023-02-24

## 2023-06-12 RX ORDER — GLYBURIDE 5 MG/1
1 TABLET ORAL DAILY
COMMUNITY
Start: 2004-06-07 | End: 2023-06-15 | Stop reason: ALTCHOICE

## 2023-06-12 RX ORDER — MULTIVIT-MIN/IRON FUM/FOLIC AC 7.5 MG-4
1 TABLET ORAL DAILY
COMMUNITY
End: 2023-12-07 | Stop reason: SDUPTHER

## 2023-06-12 RX ORDER — INSULIN LISPRO 100 [IU]/ML
INJECTION, SOLUTION SUBCUTANEOUS
COMMUNITY
Start: 2023-03-06 | End: 2023-12-19 | Stop reason: ALTCHOICE

## 2023-06-12 RX ORDER — DOCUSATE SODIUM 100 MG/1
CAPSULE, LIQUID FILLED ORAL
COMMUNITY
Start: 2023-03-06 | End: 2023-12-19 | Stop reason: ALTCHOICE

## 2023-06-12 RX ORDER — AMOXICILLIN AND CLAVULANATE POTASSIUM 500; 125 MG/1; MG/1
1 TABLET, FILM COATED ORAL 2 TIMES DAILY
COMMUNITY
Start: 2022-08-04 | End: 2023-06-15 | Stop reason: ALTCHOICE

## 2023-06-12 RX ORDER — INSULIN ASPART 100 [IU]/ML
INJECTION, SOLUTION INTRAVENOUS; SUBCUTANEOUS
COMMUNITY
End: 2023-10-09 | Stop reason: ALTCHOICE

## 2023-06-12 RX ORDER — MELOXICAM 7.5 MG/1
TABLET ORAL
COMMUNITY
Start: 2023-04-30 | End: 2023-12-07 | Stop reason: SDUPTHER

## 2023-06-12 RX ORDER — SODIUM CHLORIDE 0.9 % (FLUSH) 0.9 %
SYRINGE (ML) INJECTION
COMMUNITY
Start: 2023-03-06 | End: 2023-06-15 | Stop reason: ALTCHOICE

## 2023-06-12 RX ORDER — LISINOPRIL 40 MG/1
1 TABLET ORAL DAILY
COMMUNITY
Start: 2014-06-05 | End: 2023-12-07 | Stop reason: SDUPTHER

## 2023-06-12 RX ORDER — METOPROLOL SUCCINATE 50 MG/1
TABLET, EXTENDED RELEASE ORAL
COMMUNITY
Start: 2004-06-25 | End: 2023-06-15 | Stop reason: ALTCHOICE

## 2023-06-12 RX ORDER — TIZANIDINE 2 MG/1
TABLET ORAL
COMMUNITY
Start: 2023-02-10 | End: 2023-06-15 | Stop reason: ALTCHOICE

## 2023-06-12 RX ORDER — ASPIRIN 81 MG
TABLET, DELAYED RELEASE (ENTERIC COATED) ORAL
COMMUNITY
Start: 2023-03-06

## 2023-06-12 RX ORDER — BUMETANIDE 2 MG/1
TABLET ORAL 2 TIMES DAILY
COMMUNITY
End: 2023-06-15 | Stop reason: ALTCHOICE

## 2023-06-12 RX ORDER — INSULIN DEGLUDEC 100 U/ML
INJECTION, SOLUTION SUBCUTANEOUS
COMMUNITY
Start: 2023-02-24 | End: 2023-10-09 | Stop reason: ALTCHOICE

## 2023-06-12 RX ORDER — AMLODIPINE BESYLATE 10 MG/1
TABLET ORAL
COMMUNITY
Start: 2014-06-05 | End: 2023-06-15 | Stop reason: ALTCHOICE

## 2023-06-12 RX ORDER — IBUPROFEN 200 MG
TABLET ORAL
COMMUNITY
Start: 2023-03-22 | End: 2023-12-19 | Stop reason: ALTCHOICE

## 2023-06-12 RX ORDER — METOPROLOL SUCCINATE 100 MG/1
1 TABLET, EXTENDED RELEASE ORAL DAILY
COMMUNITY
Start: 2023-03-06 | End: 2023-12-07 | Stop reason: SDUPTHER

## 2023-06-12 RX ORDER — BLOOD-GLUCOSE METER
EACH MISCELLANEOUS
COMMUNITY
Start: 2023-02-15

## 2023-06-12 RX ORDER — ISOSORBIDE DINITRATE 10 MG/1
TABLET ORAL
COMMUNITY
Start: 2023-03-06 | End: 2023-06-15 | Stop reason: ALTCHOICE

## 2023-06-12 RX ORDER — INSULIN ASPART 100 [IU]/ML
INJECTION, SOLUTION INTRAVENOUS; SUBCUTANEOUS
COMMUNITY
Start: 2023-04-06 | End: 2023-10-09 | Stop reason: ALTCHOICE

## 2023-06-12 RX ORDER — COLCHICINE 0.6 MG/1
TABLET ORAL
COMMUNITY
End: 2023-06-15 | Stop reason: ALTCHOICE

## 2023-06-12 RX ORDER — HYDRALAZINE HYDROCHLORIDE 100 MG/1
TABLET, FILM COATED ORAL 2 TIMES DAILY
COMMUNITY
Start: 2021-10-06 | End: 2023-06-15 | Stop reason: ALTCHOICE

## 2023-06-12 RX ORDER — LISINOPRIL 10 MG/1
TABLET ORAL
COMMUNITY
Start: 2004-06-25 | End: 2023-06-15 | Stop reason: ALTCHOICE

## 2023-06-12 RX ORDER — CHLORTHALIDONE 25 MG/1
TABLET ORAL
COMMUNITY
Start: 2023-03-06 | End: 2023-12-19 | Stop reason: SDUPTHER

## 2023-06-12 RX ORDER — FERROUS SULFATE 325(65) MG
TABLET ORAL
COMMUNITY
Start: 2023-03-06

## 2023-06-12 RX ORDER — BUMETANIDE 2 MG/1
2 TABLET ORAL
COMMUNITY
End: 2023-06-15 | Stop reason: ALTCHOICE

## 2023-06-12 RX ORDER — HEPARIN SODIUM,PORCINE/PF 10 UNIT/ML
SYRINGE (ML) INTRAVENOUS
COMMUNITY
Start: 2023-03-06 | End: 2023-12-19 | Stop reason: ALTCHOICE

## 2023-06-12 RX ORDER — METOPROLOL SUCCINATE 100 MG/1
TABLET, EXTENDED RELEASE ORAL
COMMUNITY
Start: 2023-03-06 | End: 2023-06-15 | Stop reason: ALTCHOICE

## 2023-06-12 RX ORDER — DAPAGLIFLOZIN 10 MG/1
1 TABLET, FILM COATED ORAL DAILY
COMMUNITY
Start: 2023-02-15 | End: 2023-06-15 | Stop reason: ALTCHOICE

## 2023-06-12 RX ORDER — METFORMIN HYDROCHLORIDE 1000 MG/1
TABLET ORAL
COMMUNITY
Start: 2004-06-07 | End: 2023-11-24 | Stop reason: SINTOL

## 2023-06-12 RX ORDER — ONDANSETRON 4 MG/1
4 TABLET, FILM COATED ORAL 3 TIMES DAILY
COMMUNITY
Start: 2022-09-01 | End: 2023-12-07 | Stop reason: SDUPTHER

## 2023-06-12 RX ORDER — OXYCODONE AND ACETAMINOPHEN 5; 325 MG/1; MG/1
1 TABLET ORAL EVERY 6 HOURS PRN
COMMUNITY
Start: 2023-03-07 | End: 2023-12-07 | Stop reason: SDUPTHER

## 2023-06-12 RX ORDER — PREDNISONE 20 MG/1
TABLET ORAL
COMMUNITY
Start: 2023-02-22 | End: 2023-06-15 | Stop reason: ALTCHOICE

## 2023-06-12 RX ORDER — ALLOPURINOL 300 MG/1
1 TABLET ORAL DAILY
COMMUNITY
Start: 2017-04-06 | End: 2023-06-15 | Stop reason: ALTCHOICE

## 2023-06-12 RX ORDER — INSULIN DEGLUDEC 100 U/ML
INJECTION, SOLUTION SUBCUTANEOUS
COMMUNITY
End: 2023-10-09 | Stop reason: ALTCHOICE

## 2023-06-12 RX ORDER — ACETAMINOPHEN 500 MG
TABLET ORAL
COMMUNITY
Start: 2023-03-06 | End: 2023-12-07 | Stop reason: SDUPTHER

## 2023-06-12 RX ORDER — CHLORTHALIDONE 25 MG/1
1 TABLET ORAL DAILY
COMMUNITY
Start: 2021-08-20 | End: 2023-06-15 | Stop reason: ALTCHOICE

## 2023-06-12 RX ORDER — ACETAMINOPHEN 500 MG
TABLET ORAL
COMMUNITY
Start: 2022-03-10 | End: 2023-06-15 | Stop reason: ALTCHOICE

## 2023-06-12 RX ORDER — INSULIN DEGLUDEC 100 U/ML
INJECTION, SOLUTION SUBCUTANEOUS
COMMUNITY
Start: 2023-03-06 | End: 2023-10-09 | Stop reason: ALTCHOICE

## 2023-06-12 RX ORDER — UBIQUINOL 100 MG
CAPSULE ORAL
COMMUNITY
Start: 2023-04-21 | End: 2023-12-07 | Stop reason: SDUPTHER

## 2023-06-12 RX ORDER — FERROUS SULFATE 325(65) MG
3 TABLET ORAL DAILY
COMMUNITY
Start: 2016-10-18 | End: 2023-06-15 | Stop reason: ALTCHOICE

## 2023-06-12 RX ORDER — HYDRALAZINE HYDROCHLORIDE 100 MG/1
TABLET, FILM COATED ORAL
COMMUNITY
Start: 2023-03-06 | End: 2023-12-07 | Stop reason: SDUPTHER

## 2023-06-12 RX ORDER — ISOSORBIDE DINITRATE 10 MG/1
TABLET ORAL 2 TIMES DAILY
COMMUNITY
Start: 2018-05-30 | End: 2023-12-07 | Stop reason: SDUPTHER

## 2023-06-12 RX ORDER — FUROSEMIDE 40 MG/1
TABLET ORAL
COMMUNITY
Start: 2004-06-09 | End: 2023-12-07 | Stop reason: SDUPTHER

## 2023-06-12 RX ORDER — AMLODIPINE BESYLATE 10 MG/1
TABLET ORAL
COMMUNITY
Start: 2023-03-06 | End: 2023-12-07 | Stop reason: SDUPTHER

## 2023-06-12 RX ORDER — DAPAGLIFLOZIN 10 MG/1
TABLET, FILM COATED ORAL
COMMUNITY
Start: 2023-03-06 | End: 2023-12-07 | Stop reason: SDUPTHER

## 2023-06-12 RX ORDER — ALLOPURINOL 300 MG/1
TABLET ORAL
COMMUNITY
Start: 2023-03-06 | End: 2023-12-07 | Stop reason: SDUPTHER

## 2023-06-12 RX ORDER — BUMETANIDE 2 MG/1
TABLET ORAL
COMMUNITY
Start: 2023-03-06 | End: 2023-12-07 | Stop reason: SDUPTHER

## 2023-06-15 ENCOUNTER — LAB (OUTPATIENT)
Dept: LAB | Facility: LAB | Age: 58
End: 2023-06-15
Payer: COMMERCIAL

## 2023-06-15 ENCOUNTER — OFFICE VISIT (OUTPATIENT)
Dept: PRIMARY CARE | Facility: CLINIC | Age: 58
End: 2023-06-15
Payer: COMMERCIAL

## 2023-06-15 VITALS
HEART RATE: 82 BPM | OXYGEN SATURATION: 98 % | DIASTOLIC BLOOD PRESSURE: 66 MMHG | WEIGHT: 315 LBS | HEIGHT: 72 IN | BODY MASS INDEX: 42.66 KG/M2 | TEMPERATURE: 98.2 F | SYSTOLIC BLOOD PRESSURE: 106 MMHG

## 2023-06-15 DIAGNOSIS — I50.9 CONGESTIVE HEART FAILURE, UNSPECIFIED HF CHRONICITY, UNSPECIFIED HEART FAILURE TYPE (MULTI): ICD-10-CM

## 2023-06-15 DIAGNOSIS — I50.30 HEART FAILURE WITH PRESERVED EJECTION FRACTION, UNSPECIFIED HF CHRONICITY (MULTI): ICD-10-CM

## 2023-06-15 DIAGNOSIS — I10 HTN (HYPERTENSION), BENIGN: ICD-10-CM

## 2023-06-15 DIAGNOSIS — E55.9 VITAMIN D DEFICIENCY: ICD-10-CM

## 2023-06-15 DIAGNOSIS — N17.9 AKI (ACUTE KIDNEY INJURY) (CMS-HCC): ICD-10-CM

## 2023-06-15 DIAGNOSIS — Z79.4 TYPE 2 DIABETES MELLITUS WITHOUT COMPLICATION, WITH LONG-TERM CURRENT USE OF INSULIN (MULTI): ICD-10-CM

## 2023-06-15 DIAGNOSIS — D64.9 ANEMIA, UNSPECIFIED TYPE: ICD-10-CM

## 2023-06-15 DIAGNOSIS — E11.9 TYPE 2 DIABETES MELLITUS WITHOUT COMPLICATION, WITH LONG-TERM CURRENT USE OF INSULIN (MULTI): ICD-10-CM

## 2023-06-15 DIAGNOSIS — M86.9 OSTEOMYELITIS OF LEFT FOOT, UNSPECIFIED TYPE (MULTI): ICD-10-CM

## 2023-06-15 DIAGNOSIS — M10.9 GOUT, UNSPECIFIED CAUSE, UNSPECIFIED CHRONICITY, UNSPECIFIED SITE: ICD-10-CM

## 2023-06-15 DIAGNOSIS — R29.898 WEAKNESS OF BOTH LOWER EXTREMITIES: ICD-10-CM

## 2023-06-15 DIAGNOSIS — I50.32 CHRONIC HEART FAILURE WITH PRESERVED EJECTION FRACTION (MULTI): ICD-10-CM

## 2023-06-15 DIAGNOSIS — I10 HTN (HYPERTENSION), BENIGN: Primary | ICD-10-CM

## 2023-06-15 PROBLEM — K56.600 PARTIAL INTESTINAL OBSTRUCTION (MULTI): Status: ACTIVE | Noted: 2023-06-15

## 2023-06-15 PROBLEM — R11.0 NAUSEA: Status: ACTIVE | Noted: 2023-06-15

## 2023-06-15 PROBLEM — R19.7 DIARRHEA: Status: ACTIVE | Noted: 2023-06-15

## 2023-06-15 PROBLEM — K56.609 SMALL BOWEL OBSTRUCTION (MULTI): Status: ACTIVE | Noted: 2023-06-15

## 2023-06-15 PROBLEM — R10.9 ABDOMINAL PAIN: Status: ACTIVE | Noted: 2023-06-15

## 2023-06-15 PROCEDURE — 3074F SYST BP LT 130 MM HG: CPT | Performed by: STUDENT IN AN ORGANIZED HEALTH CARE EDUCATION/TRAINING PROGRAM

## 2023-06-15 PROCEDURE — 99214 OFFICE O/P EST MOD 30 MIN: CPT | Performed by: STUDENT IN AN ORGANIZED HEALTH CARE EDUCATION/TRAINING PROGRAM

## 2023-06-15 PROCEDURE — 3046F HEMOGLOBIN A1C LEVEL >9.0%: CPT | Performed by: STUDENT IN AN ORGANIZED HEALTH CARE EDUCATION/TRAINING PROGRAM

## 2023-06-15 PROCEDURE — 3066F NEPHROPATHY DOC TX: CPT | Performed by: STUDENT IN AN ORGANIZED HEALTH CARE EDUCATION/TRAINING PROGRAM

## 2023-06-15 PROCEDURE — 3078F DIAST BP <80 MM HG: CPT | Performed by: STUDENT IN AN ORGANIZED HEALTH CARE EDUCATION/TRAINING PROGRAM

## 2023-06-15 PROCEDURE — 3008F BODY MASS INDEX DOCD: CPT | Performed by: STUDENT IN AN ORGANIZED HEALTH CARE EDUCATION/TRAINING PROGRAM

## 2023-06-15 PROCEDURE — 4010F ACE/ARB THERAPY RXD/TAKEN: CPT | Performed by: STUDENT IN AN ORGANIZED HEALTH CARE EDUCATION/TRAINING PROGRAM

## 2023-06-15 PROCEDURE — 1036F TOBACCO NON-USER: CPT | Performed by: STUDENT IN AN ORGANIZED HEALTH CARE EDUCATION/TRAINING PROGRAM

## 2023-06-15 RX ORDER — PANTOPRAZOLE SODIUM 40 MG/1
40 TABLET, DELAYED RELEASE ORAL DAILY
Qty: 30 TABLET | Refills: 5 | Status: SHIPPED | OUTPATIENT
Start: 2023-06-15 | End: 2023-06-15

## 2023-06-15 RX ORDER — ONDANSETRON 4 MG/1
TABLET, ORALLY DISINTEGRATING ORAL EVERY 8 HOURS
COMMUNITY
Start: 2023-06-14 | End: 2023-06-18

## 2023-06-15 ASSESSMENT — PATIENT HEALTH QUESTIONNAIRE - PHQ9
2. FEELING DOWN, DEPRESSED OR HOPELESS: NOT AT ALL
SUM OF ALL RESPONSES TO PHQ9 QUESTIONS 1 AND 2: 0
1. LITTLE INTEREST OR PLEASURE IN DOING THINGS: NOT AT ALL

## 2023-06-15 ASSESSMENT — COLUMBIA-SUICIDE SEVERITY RATING SCALE - C-SSRS
2. HAVE YOU ACTUALLY HAD ANY THOUGHTS OF KILLING YOURSELF?: NO
6. HAVE YOU EVER DONE ANYTHING, STARTED TO DO ANYTHING, OR PREPARED TO DO ANYTHING TO END YOUR LIFE?: NO
1. IN THE PAST MONTH, HAVE YOU WISHED YOU WERE DEAD OR WISHED YOU COULD GO TO SLEEP AND NOT WAKE UP?: NO

## 2023-06-15 ASSESSMENT — ENCOUNTER SYMPTOMS
OCCASIONAL FEELINGS OF UNSTEADINESS: 0
DEPRESSION: 0
LOSS OF SENSATION IN FEET: 0

## 2023-06-15 NOTE — PROGRESS NOTES
"57-year-old male presenting for follow-up on multiple conditions:    HTN, CHF  Stable, tolerating current regimen well.  Blood pressure somewhat soft in office, on multiple medications.  Follows with cardiology.    DMII  Is following with endocrinology.  On insulin regimen with glipizide, Farxiga, Trulicity.  Not taking insulin daily due to low blood sugar events.  Most recently had blood sugar in the 50s.  Does not want continuous glucose monitoring.    CKD, anemia  Stable, follows with nephrology.  Has been on iron long-term, recently discontinued due to GI side effects.  Hemoglobin and MCV remained low throughout iron therapy.    Gout  Stable, tolerating current regimen well.    Vitamin D deficiency  Stable, tolerating current regimen well.    RODDY  Reports compliance with CPAP    GERD, chronic diarrhea  Repeated \"sour burps\" and diarrhea for over a month.    12 point ROS reviewed and negative other than as stated in HPI    General: Alert, oriented, pleasant, in no acute distress  HEENT:      Head: normocephalic, atraumatic;      eyes: EOMI, no scleral icterus;   CV: Heart with regular rate and rhythm, normal S1/S2, no murmurs  Lungs: CTAB without wheezing, rhonchi or rales; good respiratory effort, no increased work of breathing  Neuro: Cranial nerves grossly intact; alert and oriented  Psych: Appropriate mood and affect    1.  HTN 2.  CHF  - At goal in office, somewhat soft blood pressure reading, can consider de-escalation and treatment through cardiologist if continued  - Continue amlodipine 10 mg daily, chlorthalidone 25 mg daily, hydralazine 100 mg twice daily, metoprolol succinate 100 mg daily, isosorbide dinitrate 10 mg twice daily  - Continue to follow with cardiology    3.  DMII  -Likely should hold glipizide, and minimize insulin regimen, advised to call endocrinologist  -Continue Trulicity, Farxiga  - Repeat A1c  - Please follow-up with endocrinology to maximize blood sugar control, minimize " hypoglycemic events    4.  CKD 5.  Anemia  - Anemia likely affected by CKD and DMII, low MCV, may be thalassemia?  There was no improvement with iron treatment  - Iron studies, hemoglobin electrophoresis  - Continue to follow with nephrology    6.  Gout  - Continue allopurinol 300 mg daily    7.  Vitamin D deficiency  - Continue vitamin D2 1000 units daily    8.  RODDY  - Continue with CPAP    9.  GERD  - Trial pantoprazole 40 mg daily    10.  Chronic diarrhea  - Continue to get results from ED  - Trial daily fiber  - GI referral for further evaluation    Follow-up 3 months    Christopher D'Amico, DO       - - -

## 2023-06-15 NOTE — PATIENT INSTRUCTIONS
I do recommend you take daily supplemental fiber, you can use Metamucil, Benefiber, psyllium husk.  These are all the same thing.  They can be found at any drugstore or grocery store.  I recommend using the powder form, 1 teaspoon in your water each day, or what ever the directions specified for the particular brand.

## 2023-06-16 ENCOUNTER — APPOINTMENT (OUTPATIENT)
Dept: LAB | Facility: LAB | Age: 58
End: 2023-06-16
Payer: COMMERCIAL

## 2023-06-16 LAB
ALANINE AMINOTRANSFERASE (SGPT) (U/L) IN SER/PLAS: 9 U/L (ref 10–52)
ALBUMIN (G/DL) IN SER/PLAS: 4.1 G/DL (ref 3.4–5)
ALKALINE PHOSPHATASE (U/L) IN SER/PLAS: 65 U/L (ref 33–120)
ANION GAP IN SER/PLAS: 17 MMOL/L (ref 10–20)
ASPARTATE AMINOTRANSFERASE (SGOT) (U/L) IN SER/PLAS: 21 U/L (ref 9–39)
BASOPHILS (10*3/UL) IN BLOOD BY AUTOMATED COUNT: 0.07 X10E9/L (ref 0–0.1)
BASOPHILS/100 LEUKOCYTES IN BLOOD BY AUTOMATED COUNT: 0.6 % (ref 0–2)
BILIRUBIN TOTAL (MG/DL) IN SER/PLAS: 0.4 MG/DL (ref 0–1.2)
CALCIUM (MG/DL) IN SER/PLAS: 9.5 MG/DL (ref 8.6–10.6)
CARBON DIOXIDE, TOTAL (MMOL/L) IN SER/PLAS: 29 MMOL/L (ref 21–32)
CHLORIDE (MMOL/L) IN SER/PLAS: 99 MMOL/L (ref 98–107)
CREATININE (MG/DL) IN SER/PLAS: 2.12 MG/DL (ref 0.5–1.3)
EOSINOPHILS (10*3/UL) IN BLOOD BY AUTOMATED COUNT: 0.79 X10E9/L (ref 0–0.7)
EOSINOPHILS/100 LEUKOCYTES IN BLOOD BY AUTOMATED COUNT: 7.1 % (ref 0–6)
ERYTHROCYTE DISTRIBUTION WIDTH (RATIO) BY AUTOMATED COUNT: 19.1 % (ref 11.5–14.5)
ERYTHROCYTE MEAN CORPUSCULAR HEMOGLOBIN CONCENTRATION (G/DL) BY AUTOMATED: 31.8 G/DL (ref 32–36)
ERYTHROCYTE MEAN CORPUSCULAR VOLUME (FL) BY AUTOMATED COUNT: 68 FL (ref 80–100)
ERYTHROCYTES (10*6/UL) IN BLOOD BY AUTOMATED COUNT: 4.65 X10E12/L (ref 4.5–5.9)
ESTIMATED AVERAGE GLUCOSE FOR HBA1C: 169 MG/DL
FERRITIN (UG/LL) IN SER/PLAS: 235 UG/L (ref 20–300)
GFR MALE: 36 ML/MIN/1.73M2
GLUCOSE (MG/DL) IN SER/PLAS: 81 MG/DL (ref 74–99)
HEMATOCRIT (%) IN BLOOD BY AUTOMATED COUNT: 31.8 % (ref 41–52)
HEMOGLOBIN (G/DL) IN BLOOD: 10.1 G/DL (ref 13.5–17.5)
HEMOGLOBIN A1C/HEMOGLOBIN TOTAL IN BLOOD: 7.5 %
IMMATURE GRANULOCYTES/100 LEUKOCYTES IN BLOOD BY AUTOMATED COUNT: 0.8 % (ref 0–0.9)
IRON (UG/DL) IN SER/PLAS: 29 UG/DL (ref 35–150)
IRON BINDING CAPACITY (UG/DL) IN SER/PLAS: 269 UG/DL (ref 240–445)
IRON SATURATION (%) IN SER/PLAS: 11 % (ref 25–45)
LEUKOCYTES (10*3/UL) IN BLOOD BY AUTOMATED COUNT: 11.2 X10E9/L (ref 4.4–11.3)
LYMPHOCYTES (10*3/UL) IN BLOOD BY AUTOMATED COUNT: 2.29 X10E9/L (ref 1.2–4.8)
LYMPHOCYTES/100 LEUKOCYTES IN BLOOD BY AUTOMATED COUNT: 20.5 % (ref 13–44)
MONOCYTES (10*3/UL) IN BLOOD BY AUTOMATED COUNT: 0.92 X10E9/L (ref 0.1–1)
MONOCYTES/100 LEUKOCYTES IN BLOOD BY AUTOMATED COUNT: 8.2 % (ref 2–10)
NEUTROPHILS (10*3/UL) IN BLOOD BY AUTOMATED COUNT: 7.03 X10E9/L (ref 1.2–7.7)
NEUTROPHILS/100 LEUKOCYTES IN BLOOD BY AUTOMATED COUNT: 62.8 % (ref 40–80)
NRBC (PER 100 WBCS) BY AUTOMATED COUNT: 0 /100 WBC (ref 0–0)
PLATELETS (10*3/UL) IN BLOOD AUTOMATED COUNT: 282 X10E9/L (ref 150–450)
POTASSIUM (MMOL/L) IN SER/PLAS: 3.8 MMOL/L (ref 3.5–5.3)
PROTEIN TOTAL: 7.8 G/DL (ref 6.4–8.2)
SODIUM (MMOL/L) IN SER/PLAS: 141 MMOL/L (ref 136–145)
UREA NITROGEN (MG/DL) IN SER/PLAS: 56 MG/DL (ref 6–23)

## 2023-06-16 PROCEDURE — 83550 IRON BINDING TEST: CPT

## 2023-06-16 PROCEDURE — 80053 COMPREHEN METABOLIC PANEL: CPT

## 2023-06-16 PROCEDURE — 83021 HEMOGLOBIN CHROMOTOGRAPHY: CPT

## 2023-06-16 PROCEDURE — 83540 ASSAY OF IRON: CPT

## 2023-06-16 PROCEDURE — 83020 HEMOGLOBIN ELECTROPHORESIS: CPT | Performed by: PATHOLOGY

## 2023-06-16 PROCEDURE — 83036 HEMOGLOBIN GLYCOSYLATED A1C: CPT

## 2023-06-16 PROCEDURE — 85025 COMPLETE CBC W/AUTO DIFF WBC: CPT

## 2023-06-16 PROCEDURE — 36415 COLL VENOUS BLD VENIPUNCTURE: CPT

## 2023-06-16 PROCEDURE — 82728 ASSAY OF FERRITIN: CPT

## 2023-06-17 LAB
HEMOGLOBIN A2: 3.4 %
HEMOGLOBIN A: 65 %
HEMOGLOBIN C: 30.6 %
HEMOGLOBIN F: 1 %
HEMOGLOBIN IDENTIFICATION INTERPRETATION: NORMAL
PATH REVIEW-HGB IDENTIFICATION: NORMAL

## 2023-06-19 NOTE — RESULT ENCOUNTER NOTE
Anemia present, consistent with past labs.  Electrophoresis and normal ferritin, and no improvement with supplemental iron, most likely representing alpha thalassemia instead of iron deficiency anemia.  There is nothing further that needs to be done, but likely can continue without iron supplementation.  Can follow-up with nephrology for further advice, as decreasing kidney function can also complicate things.    Hemoglobin A1c significantly improved from 5 months ago, down to 7.5.  Continue to follow with endocrinology closely.    Kidney function decreased from last lab, consistent with May reading.  GFR down to low 30s.  Continue to follow with nephrology closely.    Reviewing the emergency room labs that have resulted since our appointment, there does appear to be lactoferrin and some blood in the stool.  Should follow-up with GI as we discussed at our appointment.  Referral was placed and then.  Unclear if there is possibly some sort of inflammatory bowel disease going on.    Remaining labs mostly unremarkable.

## 2023-06-20 LAB
CRYPTOSPORIDIUM ANTIGEN-DATA CONVERSION: NEGATIVE
GIARDIA LAMBLIA AG-DATA CONVERSION: NEGATIVE
OVA + PARASITE EXAM: NEGATIVE

## 2023-08-16 ENCOUNTER — APPOINTMENT (OUTPATIENT)
Dept: PRIMARY CARE | Facility: CLINIC | Age: 58
End: 2023-08-16
Payer: COMMERCIAL

## 2023-09-07 VITALS — WEIGHT: 315 LBS | BODY MASS INDEX: 44.16 KG/M2

## 2023-09-14 ENCOUNTER — LAB (OUTPATIENT)
Dept: LAB | Facility: LAB | Age: 58
End: 2023-09-14
Payer: COMMERCIAL

## 2023-09-14 ENCOUNTER — OFFICE VISIT (OUTPATIENT)
Dept: PRIMARY CARE | Facility: CLINIC | Age: 58
End: 2023-09-14
Payer: COMMERCIAL

## 2023-09-14 VITALS
BODY MASS INDEX: 42.66 KG/M2 | HEART RATE: 74 BPM | OXYGEN SATURATION: 95 % | SYSTOLIC BLOOD PRESSURE: 123 MMHG | HEIGHT: 72 IN | DIASTOLIC BLOOD PRESSURE: 74 MMHG | WEIGHT: 315 LBS

## 2023-09-14 DIAGNOSIS — Z79.4 TYPE 2 DIABETES MELLITUS WITHOUT COMPLICATION, WITH LONG-TERM CURRENT USE OF INSULIN (MULTI): ICD-10-CM

## 2023-09-14 DIAGNOSIS — I10 HTN (HYPERTENSION), BENIGN: ICD-10-CM

## 2023-09-14 DIAGNOSIS — I50.9 CONGESTIVE HEART FAILURE, UNSPECIFIED HF CHRONICITY, UNSPECIFIED HEART FAILURE TYPE (MULTI): ICD-10-CM

## 2023-09-14 DIAGNOSIS — Z79.4 TYPE 2 DIABETES MELLITUS WITHOUT COMPLICATION, WITH LONG-TERM CURRENT USE OF INSULIN (MULTI): Primary | ICD-10-CM

## 2023-09-14 DIAGNOSIS — E11.9 TYPE 2 DIABETES MELLITUS WITHOUT COMPLICATION, WITH LONG-TERM CURRENT USE OF INSULIN (MULTI): Primary | ICD-10-CM

## 2023-09-14 DIAGNOSIS — E11.9 TYPE 2 DIABETES MELLITUS WITHOUT COMPLICATION, WITH LONG-TERM CURRENT USE OF INSULIN (MULTI): ICD-10-CM

## 2023-09-14 LAB
ALANINE AMINOTRANSFERASE (SGPT) (U/L) IN SER/PLAS: 18 U/L (ref 10–52)
ALBUMIN (G/DL) IN SER/PLAS: 4.5 G/DL (ref 3.4–5)
ALKALINE PHOSPHATASE (U/L) IN SER/PLAS: 71 U/L (ref 33–120)
ANION GAP IN SER/PLAS: 14 MMOL/L (ref 10–20)
ASPARTATE AMINOTRANSFERASE (SGOT) (U/L) IN SER/PLAS: 30 U/L (ref 9–39)
BILIRUBIN TOTAL (MG/DL) IN SER/PLAS: 0.6 MG/DL (ref 0–1.2)
CALCIUM (MG/DL) IN SER/PLAS: 9.6 MG/DL (ref 8.6–10.6)
CARBON DIOXIDE, TOTAL (MMOL/L) IN SER/PLAS: 28 MMOL/L (ref 21–32)
CHLORIDE (MMOL/L) IN SER/PLAS: 100 MMOL/L (ref 98–107)
CHOLESTEROL (MG/DL) IN SER/PLAS: 173 MG/DL (ref 0–199)
CHOLESTEROL IN HDL (MG/DL) IN SER/PLAS: 30.5 MG/DL
CHOLESTEROL/HDL RATIO: 5.7
CREATININE (MG/DL) IN SER/PLAS: 1.98 MG/DL (ref 0.5–1.3)
ERYTHROCYTE DISTRIBUTION WIDTH (RATIO) BY AUTOMATED COUNT: 19.9 % (ref 11.5–14.5)
ERYTHROCYTE MEAN CORPUSCULAR HEMOGLOBIN CONCENTRATION (G/DL) BY AUTOMATED: 31.4 G/DL (ref 32–36)
ERYTHROCYTE MEAN CORPUSCULAR VOLUME (FL) BY AUTOMATED COUNT: 73 FL (ref 80–100)
ERYTHROCYTES (10*6/UL) IN BLOOD BY AUTOMATED COUNT: 5.08 X10E12/L (ref 4.5–5.9)
ESTIMATED AVERAGE GLUCOSE FOR HBA1C: 151 MG/DL
GFR MALE: 39 ML/MIN/1.73M2
GLUCOSE (MG/DL) IN SER/PLAS: 99 MG/DL (ref 74–99)
HEMATOCRIT (%) IN BLOOD BY AUTOMATED COUNT: 36.9 % (ref 41–52)
HEMOGLOBIN (G/DL) IN BLOOD: 11.6 G/DL (ref 13.5–17.5)
HEMOGLOBIN A1C/HEMOGLOBIN TOTAL IN BLOOD: 6.9 %
LDL: 107 MG/DL (ref 0–99)
LEUKOCYTES (10*3/UL) IN BLOOD BY AUTOMATED COUNT: 11.1 X10E9/L (ref 4.4–11.3)
NRBC (PER 100 WBCS) BY AUTOMATED COUNT: 0 /100 WBC (ref 0–0)
PLATELETS (10*3/UL) IN BLOOD AUTOMATED COUNT: 332 X10E9/L (ref 150–450)
POTASSIUM (MMOL/L) IN SER/PLAS: 3.7 MMOL/L (ref 3.5–5.3)
PROTEIN TOTAL: 7.8 G/DL (ref 6.4–8.2)
SODIUM (MMOL/L) IN SER/PLAS: 138 MMOL/L (ref 136–145)
TRIGLYCERIDE (MG/DL) IN SER/PLAS: 177 MG/DL (ref 0–149)
UREA NITROGEN (MG/DL) IN SER/PLAS: 65 MG/DL (ref 6–23)
VLDL: 35 MG/DL (ref 0–40)

## 2023-09-14 PROCEDURE — 36415 COLL VENOUS BLD VENIPUNCTURE: CPT

## 2023-09-14 PROCEDURE — 99214 OFFICE O/P EST MOD 30 MIN: CPT | Performed by: STUDENT IN AN ORGANIZED HEALTH CARE EDUCATION/TRAINING PROGRAM

## 2023-09-14 PROCEDURE — 3074F SYST BP LT 130 MM HG: CPT | Performed by: STUDENT IN AN ORGANIZED HEALTH CARE EDUCATION/TRAINING PROGRAM

## 2023-09-14 PROCEDURE — 85027 COMPLETE CBC AUTOMATED: CPT

## 2023-09-14 PROCEDURE — 3008F BODY MASS INDEX DOCD: CPT | Performed by: STUDENT IN AN ORGANIZED HEALTH CARE EDUCATION/TRAINING PROGRAM

## 2023-09-14 PROCEDURE — 1036F TOBACCO NON-USER: CPT | Performed by: STUDENT IN AN ORGANIZED HEALTH CARE EDUCATION/TRAINING PROGRAM

## 2023-09-14 PROCEDURE — 3066F NEPHROPATHY DOC TX: CPT | Performed by: STUDENT IN AN ORGANIZED HEALTH CARE EDUCATION/TRAINING PROGRAM

## 2023-09-14 PROCEDURE — 90682 RIV4 VACC RECOMBINANT DNA IM: CPT | Performed by: STUDENT IN AN ORGANIZED HEALTH CARE EDUCATION/TRAINING PROGRAM

## 2023-09-14 PROCEDURE — 3078F DIAST BP <80 MM HG: CPT | Performed by: STUDENT IN AN ORGANIZED HEALTH CARE EDUCATION/TRAINING PROGRAM

## 2023-09-14 PROCEDURE — 83036 HEMOGLOBIN GLYCOSYLATED A1C: CPT

## 2023-09-14 PROCEDURE — 80061 LIPID PANEL: CPT

## 2023-09-14 PROCEDURE — 3051F HG A1C>EQUAL 7.0%<8.0%: CPT | Performed by: STUDENT IN AN ORGANIZED HEALTH CARE EDUCATION/TRAINING PROGRAM

## 2023-09-14 PROCEDURE — 90471 IMMUNIZATION ADMIN: CPT | Performed by: STUDENT IN AN ORGANIZED HEALTH CARE EDUCATION/TRAINING PROGRAM

## 2023-09-14 PROCEDURE — 80053 COMPREHEN METABOLIC PANEL: CPT

## 2023-09-14 PROCEDURE — 4010F ACE/ARB THERAPY RXD/TAKEN: CPT | Performed by: STUDENT IN AN ORGANIZED HEALTH CARE EDUCATION/TRAINING PROGRAM

## 2023-09-14 NOTE — PROGRESS NOTES
58-year-old male presenting for follow-up on chronic conditions:    HTN, CHF  Stable, tolerating current regimen well.    DMII  Has seen endocrinology in the past, no scheduled follow-up.  Currently taking insulin and more as needed regimen than as prescribed.  Has gotten low blood sugars.  Does not tolerate well.    CKD, anemia  Stable, follows with nephrology.     Gout  Stable, tolerating current regimen well.    Vitamin D deficiency  Stable, tolerating current regimen well.    RODDY  Reports compliance with CPAP    GERD, chronic diarrhea  Significant improvement in GERD with pantoprazole.  Diarrhea has improved with fiber.        12 point ROS reviewed and negative other than as stated in HPI    General: Alert, oriented, pleasant, in no acute distress  HEENT:      Head: normocephalic, atraumatic;      eyes: EOMI, no scleral icterus;   CV: Heart with regular rate and rhythm, normal S1/S2, no murmurs  Lungs: CTAB without wheezing, rhonchi or rales; good respiratory effort, no increased work of breathing  Neuro: Cranial nerves grossly intact; alert and oriented  Psych: Appropriate mood and affect    1.  HTN 2.  CHF  - At goal in office  - Continue amlodipine 10 mg daily, chlorthalidone 25 mg daily, hydralazine 100 mg twice daily, metoprolol succinate 100 mg daily, isosorbide dinitrate 10 mg twice daily  - Continue to follow with cardiology    3.  DMII  -Continue Trulicity, Farxiga  - Repeat A1c  - Patient not taking insulin that frequently, and when he is, is having hypoglycemia episodes; we will try to maximize Trulicity and Farxiga, [facility] pharmacist to call and follow over the next 3 months, has CGM    4.  CKD 5.  Anemia  - Likely thalassemia  - Continue to follow with nephrology  -Repeat CBC    6.  Gout  - Continue allopurinol 300 mg daily    7.  Vitamin D deficiency  - Continue vitamin D2 1000 units daily    8.  RODDY  - Continue with CPAP    9.  GERD  - Continue pantoprazole 40 mg daily    10.  Chronic  diarrhea  -Continue daily fiber    Follow-up 3 months    Christopher D'Amico, DO

## 2023-09-14 NOTE — LETTER
September 15, 2023     Shola AUDREY Caraballo  [address]  [facility] [address]      Dear Mr. Caraballo:    Below are the results from your recent visit:    Hemoglobin A1c at 6.9, significant improvement over the past 8 months.  As discussed in office, I really believe full discontinuation of insulin, and maximization of Trulicity would be the best choice, particularly as there was concern for low blood sugars in the past.  Continue to work with Theresa to maximize Trulicity.     , above diabetic goal of 70,  If you are not on a statin, I would recommend starting rosuvastatin 10 mg daily     Kidney function decreased, somewhat in line with past labs, but lower than it was 5 months ago.  Continue to avoid nephrotoxic medications, keep blood pressure and blood sugar under control, and stay adequately hydrated.  Continue to follow with nephrology.     Mild anemia, consistent with past labs, likely secondary to chronic diabetes/kidney disease, likely also a possibility of thalassemia, a relatively benign genetic disease.  We will continue to monitor, has been stable.     Remaining labs unremarkable           If you have any questions or concerns, please don't hesitate to call.

## 2023-09-14 NOTE — H&P
History & Physical Reviewed:   I have reviewed the History and Physical dated:  06-Apr-2023   History and Physical reviewed and relevant findings noted. Patient examined to review pertinent physical  findings.: No significant changes   Home Medications Reviewed: no changes noted   Allergies Reviewed: no changes noted       Airway/Sedation Assessment:  ·  Emotional Status calm    ·  Neurologic alert & oriented x 3    ·  Respiratory clear to auscultation  diminished in the bases d/t body habitus    ·  Cardiovascular rhythm & rate regular    ·  GI/ soft, nontender  Round, +bsx4      · Pulses present: Radial Left, Radial Right     ·  Mouth Opening OK yes    ·  Neck Flexibility OK yes    ·  Loose Teeth no    ·  Oropharyngeal Classification Class III    ·  ASA PS Classification ASA III    ·  Sedation Plan no sedation        ERAS (Enhanced Recovery After Surgery):  ·  ERAS Patient: no     Consent:   COVID-19 Consent:  ·  COVID-19 Risk Consent Surgeon has reviewed key risks related to the risk of janet COVID-19 and if they contract COVID-19 what the risks are.     Assessment/Plan:   ·  Assessment and Plan    56 y/o AAM with an history of Lt foot cellulitis bacteremia secondary to group B Strep, was started on IV antibiotics. Today pt presents to IVL for Juan Carlos cath removal  d/t ATB completed.        Electronic Signatures:  Cari Campbell (APRN-CNP)  (Signed 12-Apr-2023 10:48)   Authored: History & Physical Reviewed, Airway/Sedation,  ERAS, Consent, Assessment/Plan, Note Completion      Last Updated: 12-Apr-2023 10:48 by Cari Campbell (APRN-CNP)

## 2023-09-15 NOTE — RESULT ENCOUNTER NOTE
Hemoglobin A1c at 6.9, significant improvement over the past 8 months.  As discussed in office, I really believe full discontinuation of insulin, and maximization of Trulicity would be the best choice, particularly as there was concern for low blood sugars in the past.  Continue to work with Theresa to maximize Trulicity.    , above diabetic goal of 70, I do not see a statin in patient's chart.  If he is not on 1, I would recommend starting rosuvastatin 10 mg daily    Kidney function decreased, somewhat in line with past labs, but lower than it was 5 months ago.  Continue to avoid nephrotoxic medications, keep blood pressure and blood sugar under control, and stay adequately hydrated.  Continue to follow with nephrology.    Mild anemia, consistent with past labs, likely secondary to chronic diabetes/kidney disease, likely also a possibility of thalassemia, a relatively benign genetic disease.  We will continue to monitor, has been stable.    Remaining labs unremarkable

## 2023-10-02 DIAGNOSIS — E11.9 DIABETES MELLITUS TYPE 2 WITHOUT RETINOPATHY (MULTI): Primary | ICD-10-CM

## 2023-10-03 DIAGNOSIS — E11.9 DIABETES MELLITUS TYPE 2 WITHOUT RETINOPATHY (MULTI): Primary | ICD-10-CM

## 2023-10-03 RX ORDER — DULAGLUTIDE 1.5 MG/.5ML
INJECTION, SOLUTION SUBCUTANEOUS
Qty: 6 ML | Refills: 1 | Status: SHIPPED | OUTPATIENT
Start: 2023-10-03 | End: 2023-10-09 | Stop reason: ALTCHOICE

## 2023-10-04 ENCOUNTER — PHARMACY VISIT (OUTPATIENT)
Dept: PHARMACY | Facility: CLINIC | Age: 58
End: 2023-10-04
Payer: COMMERCIAL

## 2023-10-04 PROCEDURE — RXMED WILLOW AMBULATORY MEDICATION CHARGE

## 2023-10-07 ENCOUNTER — PHARMACY VISIT (OUTPATIENT)
Dept: PHARMACY | Facility: CLINIC | Age: 58
End: 2023-10-07
Payer: COMMERCIAL

## 2023-10-07 PROCEDURE — RXMED WILLOW AMBULATORY MEDICATION CHARGE

## 2023-10-09 ENCOUNTER — TELEMEDICINE (OUTPATIENT)
Dept: PHARMACY | Facility: HOSPITAL | Age: 58
End: 2023-10-09
Payer: COMMERCIAL

## 2023-10-09 DIAGNOSIS — E11.9 DIABETES MELLITUS TYPE 2 WITHOUT RETINOPATHY (MULTI): Primary | ICD-10-CM

## 2023-10-09 RX ORDER — DULAGLUTIDE 3 MG/.5ML
3 INJECTION, SOLUTION SUBCUTANEOUS WEEKLY
Qty: 2 ML | Refills: 1
Start: 2023-10-09 | End: 2024-01-24 | Stop reason: ALTCHOICE

## 2023-10-09 NOTE — PROGRESS NOTES
"Pharmacist Clinic: Diabetes Management  Shola Caraballo is a 58 y.o. male was referred to Clinical Pharmacy Team for diabetes management.     Referring Provider: Christopher D'Amico, DO     HISTORY OF PRESENT ILLNESS  Approximate Date of Diagnosis: 5 + years   Known complications due to diabetes included chronic kidney disease, obesity     Diet:   - 2-3 meals per day  - Breakfast: skips  - Lunch/Dinner: chicken salad, hamburger, etc.   - Drinks: water, zero gatorde    LAB REVIEW   Glucose (mg/dL)   Date Value   09/14/2023 99   06/16/2023 81   06/14/2023 78     Hemoglobin A1C (%)   Date Value   09/14/2023 6.9 (A)   06/16/2023 7.5 (A)   01/12/2023 14.1 (A)     Bicarbonate (mmol/L)   Date Value   09/14/2023 28   06/16/2023 29   06/14/2023 30     Urea Nitrogen (mg/dL)   Date Value   09/14/2023 65 (H)   06/16/2023 56 (H)   06/14/2023 50 (H)     Creatinine (mg/dL)   Date Value   09/14/2023 1.98 (H)   06/16/2023 2.12 (H)   06/14/2023 1.59 (H)     Lab Results   Component Value Date    HGBA1C 6.9 (A) 09/14/2023    HGBA1C 7.5 (A) 06/16/2023    HGBA1C 14.1 (A) 01/12/2023     Lab Results   Component Value Date    CHOL 173 09/14/2023    CHOL 183 02/15/2023    CHOL 157 07/27/2022     Lab Results   Component Value Date    HDL 30.5 (A) 09/14/2023    HDL 39.3 (A) 02/15/2023    HDL 30.3 (A) 07/27/2022     No results found for: \"LDLCALC\"  Lab Results   Component Value Date    TRIG 177 (H) 09/14/2023    TRIG 244 (H) 02/15/2023    TRIG 167 (H) 07/27/2022       DIABETES ASSESSMENT    CURRENT PHARMACOTHERAPY  - trulicity 1.5 mg under the skin once weekly (Sundays)   - farxiga 10 mg once daily   - tresiba 20 units once daily   - aspart 20 units (patient thinks)     SECONDARY PREVENTION  - Statin? No  - ACE-I/ARB? No    HISTORICAL PHARMACOTHERAPY  - metformin: discontinued due to kidney function      SMBG MEASUREMENTS: 151, 150, 130, 131, 123, 96, 92, 121, 57, 132, 161, 140, 149, 160, 159, 161, 149, 200    DISCUSSION:   Diabetes: "   Trulicity  Discussed switching your trulicity to mounjaro, patient is not interested at this time because he just got a new shipment of trulicity and he does not want to waste meds   Increase to 2 shots a week, this will help get your sugars down  Insulin: this is a medication you take every day, or not at all, discussed because you only take it approx 2x/week, we will stop the medication   Cholesterol: cholesterol came back high, discussed starting rosuvastatin, patent states he is not interested at this time, he is already confused about his medications   Make healthy eating choices, limit red meat, incorporate fish into your diet     RECOMMENDATIONS/PLAN  1. Patients diabetes is well controlled with most recent A1c of 6.9 x% (goal < 7 %).   - Continue all meds under the continuation of care with the referring provider and clinical pharmacy team.  - Discontinue basal/bolus insulin regimen.   - Increase trulictiy to 2 shots once weekly for 3 mg in total.     2. Future considerations:  - switch trulicity to mounjaro  - initiate rosuvastatin     Clinical Pharmacist follow up: 2 weeks   Thank you,  Theresa Davis, PharmD    Verbal consent to manage patient's drug therapy was obtained from the patient. They were informed they may decline to participate or withdraw from participation in pharmacy services at any time.

## 2023-10-10 ENCOUNTER — PHARMACY VISIT (OUTPATIENT)
Dept: PHARMACY | Facility: CLINIC | Age: 58
End: 2023-10-10
Payer: COMMERCIAL

## 2023-10-10 PROCEDURE — RXMED WILLOW AMBULATORY MEDICATION CHARGE

## 2023-10-18 ENCOUNTER — PHARMACY VISIT (OUTPATIENT)
Dept: PHARMACY | Facility: CLINIC | Age: 58
End: 2023-10-18
Payer: COMMERCIAL

## 2023-10-18 PROCEDURE — RXMED WILLOW AMBULATORY MEDICATION CHARGE

## 2023-10-24 ENCOUNTER — TELEMEDICINE (OUTPATIENT)
Dept: PHARMACY | Facility: HOSPITAL | Age: 58
End: 2023-10-24
Payer: COMMERCIAL

## 2023-10-24 DIAGNOSIS — E11.9 DIABETES MELLITUS TYPE 2 WITHOUT RETINOPATHY (MULTI): Primary | ICD-10-CM

## 2023-10-24 NOTE — PROGRESS NOTES
"Pharmacist Clinic: Diabetes Management  Shola Caraballo is a 58 y.o. male was referred to Clinical Pharmacy Team for diabetes management.     Referring Provider: Christopher D'Amico, DO     HISTORY OF PRESENT ILLNESS  Approximate Date of Diagnosis: 5 + years   Known complications due to diabetes included chronic kidney disease, obesity     Diet:   - 2-3 meals per day  - Breakfast: skips  - Lunch/Dinner: chicken salad, hamburger, etc.   - Drinks: water, zero gatorde    LAB REVIEW   Glucose (mg/dL)   Date Value   09/14/2023 99   06/16/2023 81   06/14/2023 78     Hemoglobin A1C (%)   Date Value   09/14/2023 6.9 (A)   06/16/2023 7.5 (A)   01/12/2023 14.1 (A)     Bicarbonate (mmol/L)   Date Value   09/14/2023 28   06/16/2023 29   06/14/2023 30     Urea Nitrogen (mg/dL)   Date Value   09/14/2023 65 (H)   06/16/2023 56 (H)   06/14/2023 50 (H)     Creatinine (mg/dL)   Date Value   09/14/2023 1.98 (H)   06/16/2023 2.12 (H)   06/14/2023 1.59 (H)     Lab Results   Component Value Date    HGBA1C 6.9 (A) 09/14/2023    HGBA1C 7.5 (A) 06/16/2023    HGBA1C 14.1 (A) 01/12/2023     Lab Results   Component Value Date    CHOL 173 09/14/2023    CHOL 183 02/15/2023    CHOL 157 07/27/2022     Lab Results   Component Value Date    HDL 30.5 (A) 09/14/2023    HDL 39.3 (A) 02/15/2023    HDL 30.3 (A) 07/27/2022     No results found for: \"LDLCALC\"  Lab Results   Component Value Date    TRIG 177 (H) 09/14/2023    TRIG 244 (H) 02/15/2023    TRIG 167 (H) 07/27/2022       DIABETES ASSESSMENT    CURRENT PHARMACOTHERAPY  - trulicity 3 mg under the skin once weekly (Sundays): gives himself 2 shots of 1.5 (10 more shots at home)  - farxiga 10 mg once daily     SECONDARY PREVENTION  - Statin? No  - ACE-I/ARB? No    HISTORICAL PHARMACOTHERAPY  - metformin: discontinued due to kidney function   - tresiba 20 units once daily: non-adherent (2 injections per week)  - aspart 20 units (patient thinks): non-adherent (maybe 1 injection per week)     SMBG " MEASUREMENTS: 194     DISCUSSION:   Diabetes:   Trulicity: patient gave himself 3 mg on Sunday, he is tolerating the medication without issue   Discussed switching your trulicity to mounjaro, patient is not interested at this time because he just got a new shipment of trulicity and he does not want to waste meds   Insulin: patient has completely stopped his insulin regmin     RECOMMENDATIONS/PLAN  1. Patients diabetes is well controlled with most recent A1c of 6.9 x% (goal < 7 %).   - Continue all meds under the continuation of care with the referring provider and clinical pharmacy team.    2. Future considerations:  - switch trulicity to mounjaro  - initiate rosuvastatin     Clinical Pharmacist follow up: 4 weeks     Thank you,  Theresa Davis, PharmD    Verbal consent to manage patient's drug therapy was obtained from the patient. They were informed they may decline to participate or withdraw from participation in pharmacy services at any time.

## 2023-10-30 ENCOUNTER — PHARMACY VISIT (OUTPATIENT)
Dept: PHARMACY | Facility: CLINIC | Age: 58
End: 2023-10-30
Payer: COMMERCIAL

## 2023-10-30 PROCEDURE — RXMED WILLOW AMBULATORY MEDICATION CHARGE

## 2023-11-04 ENCOUNTER — PHARMACY VISIT (OUTPATIENT)
Dept: PHARMACY | Facility: CLINIC | Age: 58
End: 2023-11-04
Payer: COMMERCIAL

## 2023-11-04 PROCEDURE — RXMED WILLOW AMBULATORY MEDICATION CHARGE

## 2023-11-24 ENCOUNTER — TELEMEDICINE (OUTPATIENT)
Dept: PHARMACY | Facility: HOSPITAL | Age: 58
End: 2023-11-24
Payer: COMMERCIAL

## 2023-11-24 DIAGNOSIS — E11.9 DIABETES MELLITUS TYPE 2 WITHOUT RETINOPATHY (MULTI): ICD-10-CM

## 2023-11-24 RX ORDER — TIRZEPATIDE 5 MG/.5ML
5 INJECTION, SOLUTION SUBCUTANEOUS
Qty: 2 ML | Refills: 1 | Status: SHIPPED | OUTPATIENT
Start: 2023-11-24 | End: 2024-01-25 | Stop reason: SDUPTHER

## 2023-11-24 NOTE — ASSESSMENT & PLAN NOTE
Diabetes is controlled with A1c of 6.9% (goal <7%)  CONTINUE Trulicity 3 mg for another 2 weeks until supply if finished then START Mounjaro 5 mg under the skin once weekly starting Sohail 12/10  Prescription for Mounjaro sent to  [facility] for delivery

## 2023-11-24 NOTE — PROGRESS NOTES
Subjective   Patient ID: Shola Caraballo is a 58 y.o. male who presents for Diabetes.  At last pharmacy visit, no medication changes were made.    Referring Provider: Christopher D'Amico,      Diabetes  He presents for his follow-up diabetic visit. His disease course has been stable. There are no hypoglycemic associated symptoms.     FBG Readings: 76, 83, 97, 110, 113, 126, 130, 137    Current DM Medications  Trulicity 3 mg weekly  Farxiga 10 m tab daily  Glyburide 5 m tab twice daily    DISCUSSION  Patient has been tolerating Trulicity well; denies side effects  Denies signs of hypoglycemia  Confirms adherence with all medications  Patient has not noticed any weight loss or appetite loss  Patient is agreeable to switching to Mounjaro for further weight loss potential   Goal is to discontinue glyburide as Mounjaro is titrated to maximum tolerated dose    Objective     There were no vitals taken for this visit.     Labs  Lab Results   Component Value Date    BILITOT 0.6 2023    CALCIUM 9.6 2023    CO2 28 2023     2023    CREATININE 1.98 (H) 2023    GLUCOSE 99 2023    ALKPHOS 71 2023    K 3.7 2023    PROT 7.8 2023     2023    AST 30 2023    ALT 18 2023    BUN 65 (H) 2023    ANIONGAP 14 2023    MG CANCELED 2023    PHOS CANCELED 2023    ALBUMIN 4.5 2023    LIPASE 19 2023    GFRF CANCELED 2023    GFRMALE 39 (A) 2023     Lab Results   Component Value Date    TRIG 177 (H) 2023    CHOL 173 2023    HDL 30.5 (A) 2023     Lab Results   Component Value Date    HGBA1C 6.9 (A) 2023       Current Outpatient Medications on File Prior to Visit   Medication Sig Dispense Refill    Accu-Chek Guide Glucose Meter misc       Alcohol Prep Pads pads, medicated       alcohol swabs pads, medicated USE AS DIRECTED FOUR TIMES A DAY WHEN CHECKING BLOOD GLUCOSE 400 each 3     allopurinol (Zyloprim) 300 mg tablet 1 tablet DAILY (route: oral)      allopurinol (Zyloprim) 300 mg tablet TAKE 1 TABLET BY MOUTH ONCE DAILY 90 tablet 0    amLODIPine (Norvasc) 10 mg tablet 1 tablet DAILY (route: oral)      amLODIPine (Norvasc) 10 mg tablet TAKE 1 TABLET BY MOUTH TWO TIMES A  tablet 3    ascorbic acid (Vitamin C) 1,000 mg tablet Take 1 tablet (1,000 mg) by mouth once daily.      blood sugar diagnostic (Accu-Chek Guide test strips) strip USE TO TEST SUGAR TWO TIMES A DAY      blood sugar diagnostic strip USE 1 AS DIRECTED TO CHECK BLOOD GLUCOSE FOUR TIMES A  each 3    bumetanide (Bumex) 2 mg tablet 2 tablet 2 TIMES DAILY (route: oral)      bumetanide (Bumex) 2 mg tablet TAKE 1 TABLET BY MOUTH TWO TIMES A  tablet 3    chlorthalidone (Hygroton) 25 mg tablet 1 tablet DAILY (route: oral)      chlorthalidone (Hygroton) 25 mg tablet TAKE 1 TABLET BY MOUTH ONCE DAILY 30 tablet 11    cholecalciferol (Vitamin D-3) 50 mcg (2,000 unit) capsule TAKE 1 CAPSULE BY MOUTH ONCE DAILY 90 capsule 3    cholecalciferol (Vitamin D3) 50 mcg (2,000 unit) capsule 1 capsule DAILY (route: oral)      dapagliflozin (Farxiga) 10 mg 1 tablet DAILY (route: oral)      dapagliflozin propanediol (Farxiga) 10 mg TAKE 1 TABLET BY MOUTH EVERY MORNING 90 tablet 1    dapagliflozin propanediol (Farxiga) 10 mg TAKE 1 TABLET BY MOUTH EVERY MORNING 90 tablet 1    docusate sodium (Colace) 100 mg capsule 1 capsule DAILY (route: oral)      dulaglutide (Trulicity) 3 mg/0.5 mL pen injector Inject 3 mg under the skin 1 (one) time per week in the early morning. for 28 days. 2 mL 1    ferrous sulfate 325 (65 Fe) MG tablet 3 tablet DAILY (route: oral)      furosemide (Lasix) 40 mg tablet Take one(1) tablet daily.      gabapentin (Neurontin) 100 mg capsule Take 1 capsule (100 mg) by mouth 3 times a day.      glyBURIDE (Diabeta) 5 mg tablet Take 2 tablets (10 mg) by mouth 2 times a day before meals.      glyBURIDE (Diabeta) 5 mg  tablet TAKE 2 TABLETS BY MOUTH TWO TIMES A DAY BEFORE BREAKFAST AND SUPPER 360 tablet 3    heparin flush (heparin LockFlush,Porcine,,PF,) 10 unit/mL injection 5 mL DAILY (route: intravenous)      hydrALAZINE (Apresoline) 100 mg tablet 1 tablet DAILY (route: oral)      hydrALAZINE (Apresoline) 100 mg tablet TAKE 1 TABLET BY MOUTH TWO TIMES A  tablet 3    ibuprofen 200 mg tablet 1 tablet 4 TIMES DAILY (route: oral)      insulin lispro (HumaLOG) 100 unit/mL injection 10 unit 3 TIMES DAILY (route: subcutaneous)      isosorbide dinitrate (Isordil) 10 mg tablet Take 1 tablet (10 mg) by mouth 2 times a day.      isosorbide dinitrate (Isordil) 10 mg tablet Take by mouth twice a day.      isosorbide dinitrate (Isordil) 10 mg tablet TAKE 1 TABLET BY MOUTH TWO TIMES A  tablet 3    lancets (Accu-Chek Softclix Lancets) misc Use as directed to test twice daily      lancets misc USE AS DIRECTED TO CHECK BLOOD GLUCOSE FOUR TIMES A  each 3    lisinopril 40 mg tablet Take 1 tablet (40 mg) by mouth once daily.      lisinopril 40 mg tablet TAKE 1 TABLET BY MOUTH ONCE DAILY 90 tablet 3    meloxicam (Mobic) 7.5 mg tablet       meloxicam (Mobic) 7.5 mg tablet TAKE 2 TABLETS BY MOUTH ONCE DAILY 60 tablet 3    metoprolol succinate XL (Toprol-XL) 100 mg 24 hr tablet Take 1 tablet (100 mg) by mouth once daily.      metoprolol succinate XL (Toprol-XL) 100 mg 24 hr tablet TAKE 1 TABLET BY MOUTH ONCE DAILY 90 tablet 3    multivit-min/ferrous fumarate (MULTI VITAMIN ORAL) Take 1 tablet by mouth once daily.      multivitamin with minerals (DAILY VITAMIN FORMULA-MINERALS) tablet 1 tablet DAILY (route: oral)      multivitamin with minerals (multivit-min-iron fum-folic ac) tablet Take 1 tablet by mouth once daily.      ondansetron (Zofran) 4 mg tablet Take 1 tablet (4 mg) by mouth 3 times a day.      oxyCODONE-acetaminophen (Percocet) 5-325 mg tablet Take 1 tablet by mouth every 6 hours if needed.      pantoprazole (ProtoNix) 40  mg EC tablet TAKE 1 TABLET BY MOUTH ONCE DAILY. DO NOT CRUSH, CHEW, OR SPLIT 30 tablet 5    [DISCONTINUED] metFORMIN (Glucophage) 1,000 mg tablet Take by mouth.       No current facility-administered medications on file prior to visit.        Assessment/Plan   Problem List Items Addressed This Visit             ICD-10-CM    Diabetes mellitus type 2 without retinopathy (CMS/[facility]) E11.9     Diabetes is controlled with A1c of 6.9% (goal <7%)  CONTINUE Trulicity 3 mg for another 2 weeks until supply if finished then START Mounjaro 5 mg under the skin once weekly starting [place] 12/10  Prescription for Mounjaro sent to  [place] for delivery           Follow up 12/28 to assess tolerability of Mounjaro 5 mg  Sim GreeneD    Continue all meds under the continuation of care with the referring provider and clinical pharmacy team.

## 2023-11-27 ENCOUNTER — PHARMACY VISIT (OUTPATIENT)
Dept: PHARMACY | Facility: CLINIC | Age: 58
End: 2023-11-27
Payer: COMMERCIAL

## 2023-11-27 PROCEDURE — RXMED WILLOW AMBULATORY MEDICATION CHARGE

## 2023-11-28 PROCEDURE — RXMED WILLOW AMBULATORY MEDICATION CHARGE

## 2023-11-29 ENCOUNTER — PHARMACY VISIT (OUTPATIENT)
Dept: PHARMACY | Facility: CLINIC | Age: 58
End: 2023-11-29
Payer: COMMERCIAL

## 2023-12-04 ENCOUNTER — PHARMACY VISIT (OUTPATIENT)
Dept: PHARMACY | Facility: CLINIC | Age: 58
End: 2023-12-04
Payer: COMMERCIAL

## 2023-12-04 DIAGNOSIS — I10 HTN (HYPERTENSION), BENIGN: ICD-10-CM

## 2023-12-04 DIAGNOSIS — I50.9 CONGESTIVE HEART FAILURE, UNSPECIFIED HF CHRONICITY, UNSPECIFIED HEART FAILURE TYPE (MULTI): ICD-10-CM

## 2023-12-04 DIAGNOSIS — M10.9 GOUT, UNSPECIFIED CAUSE, UNSPECIFIED CHRONICITY, UNSPECIFIED SITE: ICD-10-CM

## 2023-12-04 DIAGNOSIS — I50.32 CHRONIC HEART FAILURE WITH PRESERVED EJECTION FRACTION (MULTI): ICD-10-CM

## 2023-12-04 DIAGNOSIS — Z79.4 TYPE 2 DIABETES MELLITUS WITHOUT COMPLICATION, WITH LONG-TERM CURRENT USE OF INSULIN (MULTI): ICD-10-CM

## 2023-12-04 DIAGNOSIS — E11.9 TYPE 2 DIABETES MELLITUS WITHOUT COMPLICATION, WITH LONG-TERM CURRENT USE OF INSULIN (MULTI): ICD-10-CM

## 2023-12-04 DIAGNOSIS — I50.30 HEART FAILURE WITH PRESERVED EJECTION FRACTION, UNSPECIFIED HF CHRONICITY (MULTI): ICD-10-CM

## 2023-12-04 DIAGNOSIS — E55.9 VITAMIN D DEFICIENCY: ICD-10-CM

## 2023-12-04 DIAGNOSIS — D64.9 ANEMIA, UNSPECIFIED TYPE: ICD-10-CM

## 2023-12-04 PROCEDURE — RXMED WILLOW AMBULATORY MEDICATION CHARGE

## 2023-12-05 PROCEDURE — RXMED WILLOW AMBULATORY MEDICATION CHARGE

## 2023-12-05 RX ORDER — PANTOPRAZOLE SODIUM 40 MG/1
40 TABLET, DELAYED RELEASE ORAL DAILY
Qty: 90 TABLET | Refills: 1 | Status: SHIPPED | OUTPATIENT
Start: 2023-12-05 | End: 2024-03-07 | Stop reason: SDUPTHER

## 2023-12-06 ENCOUNTER — PHARMACY VISIT (OUTPATIENT)
Dept: PHARMACY | Facility: CLINIC | Age: 58
End: 2023-12-06
Payer: COMMERCIAL

## 2023-12-07 ENCOUNTER — LAB (OUTPATIENT)
Dept: LAB | Facility: LAB | Age: 58
End: 2023-12-07
Payer: COMMERCIAL

## 2023-12-07 ENCOUNTER — OFFICE VISIT (OUTPATIENT)
Dept: PRIMARY CARE | Facility: CLINIC | Age: 58
End: 2023-12-07
Payer: COMMERCIAL

## 2023-12-07 VITALS
HEIGHT: 72 IN | HEART RATE: 76 BPM | BODY MASS INDEX: 42.66 KG/M2 | OXYGEN SATURATION: 97 % | SYSTOLIC BLOOD PRESSURE: 118 MMHG | WEIGHT: 315 LBS | DIASTOLIC BLOOD PRESSURE: 71 MMHG

## 2023-12-07 DIAGNOSIS — I50.32 CHRONIC HEART FAILURE WITH PRESERVED EJECTION FRACTION (MULTI): ICD-10-CM

## 2023-12-07 DIAGNOSIS — E11.9 DIABETES MELLITUS TYPE 2 WITHOUT RETINOPATHY (MULTI): ICD-10-CM

## 2023-12-07 DIAGNOSIS — E11.9 TYPE 2 DIABETES MELLITUS WITHOUT COMPLICATION, WITH LONG-TERM CURRENT USE OF INSULIN (MULTI): ICD-10-CM

## 2023-12-07 DIAGNOSIS — E55.9 VITAMIN D DEFICIENCY: ICD-10-CM

## 2023-12-07 DIAGNOSIS — I10 HTN (HYPERTENSION), BENIGN: ICD-10-CM

## 2023-12-07 DIAGNOSIS — I50.9 CONGESTIVE HEART FAILURE, UNSPECIFIED HF CHRONICITY, UNSPECIFIED HEART FAILURE TYPE (MULTI): ICD-10-CM

## 2023-12-07 DIAGNOSIS — I10 HTN (HYPERTENSION), BENIGN: Primary | ICD-10-CM

## 2023-12-07 DIAGNOSIS — Z79.4 TYPE 2 DIABETES MELLITUS WITHOUT COMPLICATION, WITH LONG-TERM CURRENT USE OF INSULIN (MULTI): ICD-10-CM

## 2023-12-07 DIAGNOSIS — M10.9 GOUT, UNSPECIFIED CAUSE, UNSPECIFIED CHRONICITY, UNSPECIFIED SITE: ICD-10-CM

## 2023-12-07 DIAGNOSIS — D64.9 ANEMIA, UNSPECIFIED TYPE: ICD-10-CM

## 2023-12-07 PROCEDURE — 4010F ACE/ARB THERAPY RXD/TAKEN: CPT | Performed by: STUDENT IN AN ORGANIZED HEALTH CARE EDUCATION/TRAINING PROGRAM

## 2023-12-07 PROCEDURE — 3008F BODY MASS INDEX DOCD: CPT | Performed by: STUDENT IN AN ORGANIZED HEALTH CARE EDUCATION/TRAINING PROGRAM

## 2023-12-07 PROCEDURE — 3066F NEPHROPATHY DOC TX: CPT | Performed by: STUDENT IN AN ORGANIZED HEALTH CARE EDUCATION/TRAINING PROGRAM

## 2023-12-07 PROCEDURE — 3078F DIAST BP <80 MM HG: CPT | Performed by: STUDENT IN AN ORGANIZED HEALTH CARE EDUCATION/TRAINING PROGRAM

## 2023-12-07 PROCEDURE — 1036F TOBACCO NON-USER: CPT | Performed by: STUDENT IN AN ORGANIZED HEALTH CARE EDUCATION/TRAINING PROGRAM

## 2023-12-07 PROCEDURE — 3044F HG A1C LEVEL LT 7.0%: CPT | Performed by: STUDENT IN AN ORGANIZED HEALTH CARE EDUCATION/TRAINING PROGRAM

## 2023-12-07 PROCEDURE — 3074F SYST BP LT 130 MM HG: CPT | Performed by: STUDENT IN AN ORGANIZED HEALTH CARE EDUCATION/TRAINING PROGRAM

## 2023-12-07 PROCEDURE — 99214 OFFICE O/P EST MOD 30 MIN: CPT | Performed by: STUDENT IN AN ORGANIZED HEALTH CARE EDUCATION/TRAINING PROGRAM

## 2023-12-07 RX ORDER — COLCHICINE 0.6 MG/1
0.6 TABLET ORAL DAILY
Qty: 30 TABLET | Refills: 5 | Status: SHIPPED | OUTPATIENT
Start: 2023-12-07 | End: 2023-12-08 | Stop reason: SDUPTHER

## 2023-12-07 RX ORDER — ACETAMINOPHEN 500 MG
2000 TABLET ORAL DAILY
Qty: 90 CAPSULE | Refills: 3 | Status: SHIPPED | OUTPATIENT
Start: 2023-12-07 | End: 2024-12-06

## 2023-12-07 ASSESSMENT — COLUMBIA-SUICIDE SEVERITY RATING SCALE - C-SSRS
6. HAVE YOU EVER DONE ANYTHING, STARTED TO DO ANYTHING, OR PREPARED TO DO ANYTHING TO END YOUR LIFE?: NO
1. IN THE PAST MONTH, HAVE YOU WISHED YOU WERE DEAD OR WISHED YOU COULD GO TO SLEEP AND NOT WAKE UP?: NO
2. HAVE YOU ACTUALLY HAD ANY THOUGHTS OF KILLING YOURSELF?: NO

## 2023-12-07 ASSESSMENT — PATIENT HEALTH QUESTIONNAIRE - PHQ9
1. LITTLE INTEREST OR PLEASURE IN DOING THINGS: NOT AT ALL
SUM OF ALL RESPONSES TO PHQ9 QUESTIONS 1 AND 2: 0
2. FEELING DOWN, DEPRESSED OR HOPELESS: NOT AT ALL

## 2023-12-07 ASSESSMENT — ENCOUNTER SYMPTOMS
LOSS OF SENSATION IN FEET: 0
OCCASIONAL FEELINGS OF UNSTEADINESS: 0
DEPRESSION: 0

## 2023-12-07 NOTE — PROGRESS NOTES
58-year-old male presenting for follow-up on chronic conditions:    HTN, CHF  Stable, tolerating current regimen well.    DMII  Has been following with endocrinology, and been talking to our pharmacist monthly.  Reports compliance with medication.    CKD, anemia  Stable, follows with nephrology.     Gout  Stable, tolerating current regimen well.    Vitamin D deficiency  Stable, tolerating current regimen well.    RODDY  Reports compliance with CPAP    GERD, chronic diarrhea  Stable    12 point ROS reviewed and negative other than as stated in HPI    General: Alert, oriented, pleasant, in no acute distress  HEENT:      Head: normocephalic, atraumatic;      eyes: EOMI, no scleral icterus;   CV: Heart with regular rate and rhythm, normal S1/S2, no murmurs  Lungs: CTAB without wheezing, rhonchi or rales; good respiratory effort, no increased work of breathing  Extremities: No significant edema, wearing compression stockings bilaterally  Neuro: Cranial nerves grossly intact; alert and oriented  Psych: Appropriate mood and affect    #HTN #CHF  - At goal in office  - Continue amlodipine 10 mg daily, chlorthalidone 25 mg daily, hydralazine 100 mg twice daily, metoprolol succinate 100 mg daily, isosorbide dinitrate 10 mg twice daily  - Continue to follow with cardiology    #DMII  -Last A1c 6.9  -Continue Trulicity, Farxiga, currently on insulin through endocrinology, also following with [facility] pharmacist  - Repeat A1c, albumin  - Continue to titrate up regimen    # CKD #Anemia  - Likely thalassemia  - Continue to follow with nephrology  -Repeat CBC    #Gout  - Continue allopurinol 300 mg daily, colchicine as needed, nephrotoxic warnings have been given    #Vitamin D deficiency  - Continue vitamin D2 2000 units daily  -Repeat lab    #RODDY  - Continue with CPAP, reports compliance    #GERD  - Continue pantoprazole 40 mg daily    #Chronic diarrhea  -Continue daily fiber    Follow-up 3 months, CPE due at that time    Luis  D'Amico, DO

## 2023-12-08 DIAGNOSIS — M10.9 GOUT, UNSPECIFIED CAUSE, UNSPECIFIED CHRONICITY, UNSPECIFIED SITE: ICD-10-CM

## 2023-12-08 RX ORDER — COLCHICINE 0.6 MG/1
0.6 TABLET ORAL DAILY
Qty: 90 TABLET | Refills: 1 | Status: SHIPPED | OUTPATIENT
Start: 2023-12-08 | End: 2024-01-24 | Stop reason: SDUPTHER

## 2023-12-11 ENCOUNTER — LAB (OUTPATIENT)
Dept: LAB | Facility: LAB | Age: 58
End: 2023-12-11
Payer: COMMERCIAL

## 2023-12-11 DIAGNOSIS — M10.9 GOUT, UNSPECIFIED CAUSE, UNSPECIFIED CHRONICITY, UNSPECIFIED SITE: ICD-10-CM

## 2023-12-11 LAB
25(OH)D3 SERPL-MCNC: 32 NG/ML (ref 30–100)
ALBUMIN SERPL BCP-MCNC: 4.6 G/DL (ref 3.4–5)
ALP SERPL-CCNC: 76 U/L (ref 33–120)
ALT SERPL W P-5'-P-CCNC: 16 U/L (ref 10–52)
ANION GAP SERPL CALC-SCNC: 17 MMOL/L (ref 10–20)
AST SERPL W P-5'-P-CCNC: 25 U/L (ref 9–39)
BILIRUB SERPL-MCNC: 0.4 MG/DL (ref 0–1.2)
BUN SERPL-MCNC: 53 MG/DL (ref 6–23)
CALCIUM SERPL-MCNC: 9.8 MG/DL (ref 8.6–10.6)
CHLORIDE SERPL-SCNC: 102 MMOL/L (ref 98–107)
CHOLEST SERPL-MCNC: 151 MG/DL (ref 0–199)
CHOLESTEROL/HDL RATIO: 4.9
CO2 SERPL-SCNC: 25 MMOL/L (ref 21–32)
CREAT SERPL-MCNC: 1.85 MG/DL (ref 0.5–1.3)
CREAT UR-MCNC: 27.5 MG/DL (ref 20–370)
ERYTHROCYTE [DISTWIDTH] IN BLOOD BY AUTOMATED COUNT: 18.4 % (ref 11.5–14.5)
EST. AVERAGE GLUCOSE BLD GHB EST-MCNC: 171 MG/DL
GFR SERPL CREATININE-BSD FRML MDRD: 42 ML/MIN/1.73M*2
GLUCOSE SERPL-MCNC: 69 MG/DL (ref 74–99)
HBA1C MFR BLD: 7.6 %
HCT VFR BLD AUTO: 35.4 % (ref 41–52)
HDLC SERPL-MCNC: 30.9 MG/DL
HGB BLD-MCNC: 11.4 G/DL (ref 13.5–17.5)
LDLC SERPL CALC-MCNC: 96 MG/DL
MCH RBC QN AUTO: 22.4 PG (ref 26–34)
MCHC RBC AUTO-ENTMCNC: 32.2 G/DL (ref 32–36)
MCV RBC AUTO: 70 FL (ref 80–100)
MICROALBUMIN UR-MCNC: 7.8 MG/L
MICROALBUMIN/CREAT UR: 28.4 UG/MG CREAT
NON HDL CHOLESTEROL: 120 MG/DL (ref 0–149)
NRBC BLD-RTO: 0 /100 WBCS (ref 0–0)
PLATELET # BLD AUTO: 341 X10*3/UL (ref 150–450)
POTASSIUM SERPL-SCNC: 4.2 MMOL/L (ref 3.5–5.3)
PROT SERPL-MCNC: 8.3 G/DL (ref 6.4–8.2)
RBC # BLD AUTO: 5.09 X10*6/UL (ref 4.5–5.9)
SODIUM SERPL-SCNC: 140 MMOL/L (ref 136–145)
TRIGL SERPL-MCNC: 120 MG/DL (ref 0–149)
TSH SERPL-ACNC: 2.52 MIU/L (ref 0.44–3.98)
VLDL: 24 MG/DL (ref 0–40)
WBC # BLD AUTO: 8.9 X10*3/UL (ref 4.4–11.3)

## 2023-12-11 PROCEDURE — 80053 COMPREHEN METABOLIC PANEL: CPT

## 2023-12-11 PROCEDURE — 85027 COMPLETE CBC AUTOMATED: CPT

## 2023-12-11 PROCEDURE — 84443 ASSAY THYROID STIM HORMONE: CPT

## 2023-12-11 PROCEDURE — 83036 HEMOGLOBIN GLYCOSYLATED A1C: CPT

## 2023-12-11 PROCEDURE — 82043 UR ALBUMIN QUANTITATIVE: CPT

## 2023-12-11 PROCEDURE — RXMED WILLOW AMBULATORY MEDICATION CHARGE

## 2023-12-11 PROCEDURE — 82306 VITAMIN D 25 HYDROXY: CPT

## 2023-12-11 PROCEDURE — 80061 LIPID PANEL: CPT

## 2023-12-11 PROCEDURE — 36415 COLL VENOUS BLD VENIPUNCTURE: CPT

## 2023-12-11 PROCEDURE — 82570 ASSAY OF URINE CREATININE: CPT

## 2023-12-11 RX ORDER — ALLOPURINOL 300 MG/1
300 TABLET ORAL DAILY
Qty: 90 TABLET | Refills: 3 | Status: SHIPPED | OUTPATIENT
Start: 2023-12-11 | End: 2024-01-24 | Stop reason: SDUPTHER

## 2023-12-12 ENCOUNTER — PHARMACY VISIT (OUTPATIENT)
Dept: PHARMACY | Facility: CLINIC | Age: 58
End: 2023-12-12
Payer: COMMERCIAL

## 2023-12-12 RX ORDER — ALLOPURINOL 300 MG/1
300 TABLET ORAL DAILY
Qty: 90 TABLET | Refills: 0 | OUTPATIENT
Start: 2023-12-12 | End: 2024-12-11

## 2023-12-12 NOTE — RESULT ENCOUNTER NOTE
Hemoglobin A1c higher than last lab at 7.6, continue to make the switch to Mounjaro as discussed with pharmacy.    Chronic kidney disease is stable, continue to avoid nephrotoxic medications, stay adequately hydrated, we will continue to monitor.    Borderline elevation in protein, seen in past labs, and normalized in interval labs.  Can be secondary to dehydration.    Mild anemia, likely secondary to chronic kidney disease and diabetes, but also possibly thalassemia given low MCV.  Has been stable over the past year.  Will continue to monitor.    LDL at 96, above diabetic goal of 70.,  Does not appear to be on a statin, should consider rosuvastatin 5 mg daily if amenable, I will send to pharmacy.    Remaining labs unremarkable.

## 2023-12-13 ENCOUNTER — PHARMACY VISIT (OUTPATIENT)
Dept: PHARMACY | Facility: CLINIC | Age: 58
End: 2023-12-13
Payer: COMMERCIAL

## 2023-12-13 PROCEDURE — RXMED WILLOW AMBULATORY MEDICATION CHARGE

## 2023-12-13 RX ORDER — ALLOPURINOL 300 MG/1
TABLET ORAL
Qty: 90 TABLET | Refills: 3 | OUTPATIENT
Start: 2023-12-13 | End: 2024-03-07 | Stop reason: SDUPTHER

## 2023-12-19 ENCOUNTER — OFFICE VISIT (OUTPATIENT)
Dept: CARDIOLOGY | Facility: HOSPITAL | Age: 58
End: 2023-12-19
Payer: COMMERCIAL

## 2023-12-19 VITALS
BODY MASS INDEX: 42.66 KG/M2 | DIASTOLIC BLOOD PRESSURE: 75 MMHG | SYSTOLIC BLOOD PRESSURE: 143 MMHG | OXYGEN SATURATION: 98 % | HEART RATE: 82 BPM | WEIGHT: 315 LBS | HEIGHT: 72 IN

## 2023-12-19 DIAGNOSIS — I50.30 HEART FAILURE WITH PRESERVED EJECTION FRACTION, UNSPECIFIED HF CHRONICITY (MULTI): Primary | ICD-10-CM

## 2023-12-19 PROCEDURE — 3051F HG A1C>EQUAL 7.0%<8.0%: CPT | Performed by: INTERNAL MEDICINE

## 2023-12-19 PROCEDURE — 3077F SYST BP >= 140 MM HG: CPT | Performed by: INTERNAL MEDICINE

## 2023-12-19 PROCEDURE — 3061F NEG MICROALBUMINURIA REV: CPT | Performed by: INTERNAL MEDICINE

## 2023-12-19 PROCEDURE — 4010F ACE/ARB THERAPY RXD/TAKEN: CPT | Performed by: INTERNAL MEDICINE

## 2023-12-19 PROCEDURE — 3048F LDL-C <100 MG/DL: CPT | Performed by: INTERNAL MEDICINE

## 2023-12-19 PROCEDURE — 99213 OFFICE O/P EST LOW 20 MIN: CPT | Performed by: INTERNAL MEDICINE

## 2023-12-19 PROCEDURE — 1036F TOBACCO NON-USER: CPT | Performed by: INTERNAL MEDICINE

## 2023-12-19 PROCEDURE — RXMED WILLOW AMBULATORY MEDICATION CHARGE

## 2023-12-19 PROCEDURE — 3008F BODY MASS INDEX DOCD: CPT | Performed by: INTERNAL MEDICINE

## 2023-12-19 PROCEDURE — 3066F NEPHROPATHY DOC TX: CPT | Performed by: INTERNAL MEDICINE

## 2023-12-19 PROCEDURE — 3078F DIAST BP <80 MM HG: CPT | Performed by: INTERNAL MEDICINE

## 2023-12-19 RX ORDER — BUMETANIDE 2 MG/1
2 TABLET ORAL DAILY
Qty: 90 TABLET | Refills: 3 | Status: SHIPPED | OUTPATIENT
Start: 2023-12-19 | End: 2024-12-18

## 2023-12-19 RX ORDER — DAPAGLIFLOZIN 10 MG/1
10 TABLET, FILM COATED ORAL
Qty: 90 TABLET | Refills: 3 | Status: SHIPPED | OUTPATIENT
Start: 2023-12-19 | End: 2024-05-16 | Stop reason: SDUPTHER

## 2023-12-19 RX ORDER — CHLORTHALIDONE 25 MG/1
TABLET ORAL
Qty: 90 TABLET | Refills: 3 | Status: SHIPPED | OUTPATIENT
Start: 2023-12-19

## 2023-12-19 RX ORDER — ISOSORBIDE DINITRATE 10 MG/1
10 TABLET ORAL DAILY
Qty: 90 TABLET | Refills: 3 | Status: SHIPPED | OUTPATIENT
Start: 2023-12-19 | End: 2024-12-18

## 2023-12-19 RX ORDER — LISINOPRIL 40 MG/1
40 TABLET ORAL DAILY
Qty: 90 TABLET | Refills: 3 | Status: SHIPPED | OUTPATIENT
Start: 2023-12-19 | End: 2024-12-18

## 2023-12-19 RX ORDER — AMLODIPINE BESYLATE 10 MG/1
10 TABLET ORAL DAILY
Qty: 90 TABLET | Refills: 3 | Status: SHIPPED | OUTPATIENT
Start: 2023-12-19 | End: 2024-12-18

## 2023-12-19 RX ORDER — HYDRALAZINE HYDROCHLORIDE 100 MG/1
100 TABLET, FILM COATED ORAL DAILY
Qty: 90 TABLET | Refills: 3 | Status: SHIPPED | OUTPATIENT
Start: 2023-12-19 | End: 2024-01-24 | Stop reason: SDUPTHER

## 2023-12-19 RX ORDER — METOPROLOL SUCCINATE 100 MG/1
100 TABLET, EXTENDED RELEASE ORAL DAILY
Qty: 90 TABLET | Refills: 3 | Status: SHIPPED | OUTPATIENT
Start: 2023-12-19 | End: 2024-12-18

## 2023-12-19 ASSESSMENT — PAIN SCALES - GENERAL: PAINLEVEL: 0-NO PAIN

## 2023-12-19 NOTE — PROGRESS NOTES
Diagnoses/Problems  Assessed    · (HFpEF) heart failure with preserved ejection fraction (428.9) (I50.30)    Orders  (HFpEF) heart failure with preserved ejection fraction    · Electrocardiogram 12 Lead; Status:Active; Requested for:21Mar2023;   Unlinked    · Stop: tiZANidine HCl - 2 MG Oral Tablet    Patient Instructions    To reach Dr. Villaseñor's office please call [phone]. Fax [phone]. Call [phone] to schedule an appointment. You may also contact the HF RNs at [email or web address] (Please include your name and date of birth)    For MEDICATION REFILLS, please call [phone] option 6 then option 1.    1. No medication changes at this time.    2. Dr. Villaseñor will speak with your other doctors review your medications and determine a follow-up.              Chief Complaint      EVIE CARABALLO is being seen for an annual follow-up of heart failure and a routine medication evaluation.     Currently denies chest pain, palpitations, shortness of breath, dyspnea on exertion, orthopnea, PND. No edema noted in BLE. Patient denies headaches, dizziness or recent falls.      Hospitalizations: May 12-14 for small bowel obstruction      History of Present Illness  Mr Caraballo is a 58 year old man with a PMH significant for HFpEF,CKD  with  hHTN, DM, and morbid obesity who has been losing weight with medical management , exercise and managing his food intake   He is here for a routine follow up visit   Has no CV complaints , his BP has been well controlled during the prior office visits with PCP .   Of note he was admitted to [facility] end of February 2023 secondary to a left diabetic foot ulcer /cellulitis for which he underwent incision and drainage and bone biopsy he also had Bacteremia with Strep B , ADHF and TROY   Metoprolol was added to his regimen secondary to Premature beats          Social Hx: Denies smoking, vapping, ETOH, Illicit drug use.    February 2023 Echocardiogram   1. Left ventricular systolic  function is normal with a 55-60% estimated ejection fraction.  2. Poorly visualized anatomical structures due to suboptimal image quality.    Comorbid Illnesses: HTN, DM, HLD, RODDY. He has no significant interval events.   Symptoms: stable lower extremity edema, denies dyspnea on exertion, denies fatigue, denies exercise intolerance, denies orthopnea and denies paroxysmal nocturnal dyspnea. Associated symptoms include no chest pain, no syncope and no palpitations.   Home Monitoring: The patient checks his weight regularly.   Lifestyle: Diet: He consumes a diverse and healthy diet.Exercise: He does not exercise regularly.Smoking: He does not use tobacco.Alcohol: He denies alcohol use.Drug Use: He denies drug use.   Medications: the patient is adherent with his medication regimen.      *Active Problems   Problems    · (HFpEF) heart failure with preserved ejection fraction (428.9) (I50.30)   · Arthritis (716.90) (M19.90)   · Astigmatism, bilateral (367.20) (H52.203)   · Bilateral presbyopia (367.4) (H52.4)   · Cataract associated with type 2 diabetes mellitus (250.50,366.41) (E11.36)   · Cellulitis of foot (682.7) (L03.119)   · Cough (786.2) (R05.9)   · COVID-19 virus detected (079.89) (U07.1)   · COVID-19 virus infection (079.89) (U07.1)   · Diabetes mellitus type 2 without retinopathy (250.00) (E11.9)   · Diabetic retinopathy (250.50,362.01) (E11.319)   · DM type 2, uncontrolled, with renal complications (250.42)   · Elevated parathyroid hormone (259.9) (E34.9)   · Foot osteomyelitis, left (730.27) (M86.9)   · Gout (274.9) (M10.9)   · Added by Problem List Migration; 2013-6-8; Moved to Suppressed Nov 23 2013  4:10PM   · History of morbid obesity (V13.89) (Z86.39)   · HTN (hypertension), benign (401.1) (I10)   · Hyperlipidemia (272.4) (E78.5)   · Added by Problem List Migration; 2013-6-6; Moved to Suppressed Nov 23 2013  4:10PM   · Immunization due (V05.9) (Z23)   · Incisional hernia (553.21) (K43.2)   · Low HDL (under  40) (272.5) (E78.6)   · Low vitamin D level (790.6) (R79.89)   · Nasal congestion with rhinorrhea (478.19) (R09.81,J34.89)   · Nuclear sclerosis of both eyes (366.16) (H25.13)   · Obstructive sleep apnea (327.23) (G47.33)   · Onychomycosis of toenail (110.1) (B35.1)   · Peripheral vascular disease (443.9) (I73.9)   · Prerenal azotemia (790.6) (R79.89)   · Pressure ulcer of dorsum of left foot (707.09,707.20) (L89.899)   · Refraction error (367.9) (H52.7)   · Sickle cell trait (282.5) (D57.3)   · Stage III chronic kidney disease (585.3) (N18.30)   · Added by Problem List Migration; 2013-6-8   · Ulcer of right leg (707.10) (L97.919)   · Uncontrolled hypertension (401.9) (I10)   · Added by Problem List Migration; 2013-6-6    Left-sided low back pain with left-sided sciatica (724.3) (M54.42)     Surgical History  Problems    · History of Appendectomy   · History of Hip replacement   · History of Ventral hernia repair    Past Medical History  Problems    · History of Acute gout (274.01) (M10.9)   · Resolved Date: 06 Oct 2021   · History of Acute osteomyelitis (730.00) (M86.10)   · Added by Problem List Migration; 2013-6-8   · History of Callus (700) (L84)   · Resolved Date: 13 Feb 2015   · History of Chronic combined systolic and diastolic congestive heart failure  (428.42,428.0) (I50.42)   · Resolved Date: 09 Jan 2020   · Diagnosed during admission 2008 with volume overload.  LVEF 45-50%.      9/2010: Preserved EF60-65%, LV mildly dilated with LVH, LAE   · History of Chronic Osteomyelitis Of The Toes (730.10)   · Resolved Date: 27 Jul 2020   · History of Chronic renal insufficiency (585.9) (N18.9)   · Resolved Date: 13 Feb 2015   · History of Chronic venous hypertension w ulceration, right   · Resolved Date: 09 Jan 2020   · History of Chronic venous hypertension with inflammation involving both sides (459.32)  (I87.323)   · Resolved Date: 09 Jan 2020   · History of Contusion of toe (924.3) (S90.129A)   · Resolved Date:  06 Oct 2021   · History of Hand swelling (729.81) (M79.89)   · Resolved Date: 28 Jul 2021   · History of Hand swelling (729.81) (M79.89)   · Resolved Date: 28 Jul 2021   · History of anemia (V12.3) (Z86.2)   · Resolved Date: 27 Jul 2020   · History of congestive heart disease (V12.59) (Z86.79)   · Added by Problem List Migration; 2013-6-6   · History of hyperlipidemia (V12.29) (Z86.39)   · Resolved Date: 13 Feb 2015   · History of morbid obesity (V13.89) (Z86.39)   · Resolved Date: 27 Jul 2020   · History of rectal bleeding (V12.79) (Z87.19)   · Resolved Date: 28 Jul 2021   · History of Injury of toe, initial encounter (959.7) (S99.929A)   · Resolved Date: 28 Jul 2021   · History of Left foot pain (729.5) (M79.672)   · Resolved Date: 28 Jul 2021   · History of MSSA (methicillin susceptible Staphylococcus aureus) (041.11) (A49.01)   · Resolved Date: 28 Jul 2021   · History of Paronychia of toe (681.11) (L03.039)   · Resolved Date: 06 Oct 2021   · History of Pseudomonas infection (041.7) (A49.8)   · Resolved Date: 28 Jul 2021   · History of Sciatica of left side (724.3) (M54.32)   · Resolved Date: 28 Jul 2021   · Uncontrolled hypertension (401.9) (I10)   · Added by Problem List Migration; 2013-6-6    Current Meds    Medication Name Instruction   Accu-Chek Guide In Vitro Strip USE TO TEST SUGAR TWO TIMES A DAY   Accu-Chek Guide w/Device Kit check blood sugar two times  daily   dx: E11.9   Accu-Chek Softclix Lancets USE AS DIRECTED TO TEST TWICE DAILY.   Allopurinol 300 MG Oral Tablet Take 1 tablet daily   amLODIPine Besylate 10 MG Oral Tablet TAKE  1  TABLET Twice daily Please schedule a follow up appointment with Dr. Villaseñor for further refills at [phone]   BD Pen Needle Karine U/F 32G X 4 MM use once daily   Blood Pressure Monitor Automat Device BP monitor for HTN   Blood Pressure Monitor Device USE AS DIRECTED   Bumetanide 2 MG Oral Tablet Take 1 tablet twice daily   Chlorthalidone 25 MG Oral Tablet TAKE 1 TABLET  DAILY.   Colchicine 0.6 MG Oral Tablet TAKE  1  TABLET Daily PRN Gout flare   Farxiga 10 MG Oral Tablet TAKE ONE TABLET BY MOUTH EVERY MORNING   Gabapentin 100 MG Oral Capsule take 1 capsule by mouth three times a day   glyBURIDE 5 MG Oral Tablet TAKE TWO (2) TABLETS TWICE EVERY DAY BEFORE BREAKFAST AND SUPPER   hydrALAZINE HCl - 100 MG Oral Tablet TAKE  1  TABLET Twice daily Please schedule a follow up appointment with Dr. Villaseñor for further refills at [phone]   Insulin Degludec FlexTouch 100 UNIT/ML Subcutaneous Solution Pen-injector INJECT 20 UNIT Daily   Iron 325 (65 Fe) MG Oral Tablet TAKE 3 TABLET Daily   Isosorbide Dinitrate 10 MG Oral Tablet TAKE  1  TABLET Twice daily Please schedule a follow up appointment with Dr. Villaseñor for further refills at [phone]   Lisinopril 40 MG Oral Tablet TAKE  1  TABLET Twice daily Please schedule a follow up appointment with Dr. Villaseñor for further refills at [phone]   Metoprolol Succinate  MG Oral Tablet Extended Release 24 Hour Take 1 tablet daily   Multi Vitamin Oral Tablet TAKE 1 TABLET DAILY.   predniSONE 20 MG Oral Tablet TAKE 3 TABLETS DAILY FOR 3 DAYS, THEN 2 TABLETS DAILY FOR 3 DAYS, THEN 1 TABLET DAILY FOR 3 DAYS.   tiZANidine HCl - 2 MG Oral Tablet    Trulicity 1.5 MG/0.5ML Subcutaneous Solution Pen-injector USE ONE TRULICITY 1.5 MG  INJECTION PEN ON THE SAME DAY ONCE EACH WEEK .   Vitamin D3 50 MCG (2000 UT) Oral Capsule TAKE 1 CAPSULE Daily   V-R Vitamin C 1000 MG TABS TAKE 1 TABLET DAILY.     Allergies  NoKnown    · No Known Allergies  Updated By: Pat Walters; 7/4/2015 10:45:41 AM    Family History  Mother    · Family history of hypertension (V17.49) (Z82.49)  Father    · Family history of hypertension (V17.49) (Z82.49)  Maternal Relatives    · Family history of hypertension (V17.49) (Z82.49)   · Family history of Type 2 diabetes mellitus with chronic kidney disease, with long-term  current use of insulin, unspecified CKD stage  Paternal  Relatives    · Family history of hypertension (V17.49) (Z82.49)   · Family history of Type 2 diabetes mellitus with chronic kidney disease, with long-term  current use of insulin, unspecified CKD stage  Other    · No pertinent family history    Social History  Problems    · Ex-cigarette smoker (V15.82) (Z87.891)   · Feels safe at home   · No illicit drug use   · Non-smoker (V49.89) (Z78.9)   · Occasional alcohol use    Review of Systems    Constitutional: as noted in HPI.   Cardiovascular: as noted in HPI.   Respiratory: as noted in HPI.   Neurological: as noted in HPI.      Vitals    Vitals:    12/19/23 0849   BP: 143/75   Pulse: 82   SpO2: 98%        Physical Exam    Constitutional:  obese.   Eyes: no erythema, swelling or discharge from the eye .   Ears, Nose, Mouth, and Throat: external inspection of ears and nose is normal .   Neck: neck is supple, symmetric, trachea midline, no masses .   Pulmonary: lungs clear to auscultation.    Cardiovascular: regular rhythm, normal S1 and S2, no murmurs  and no edema .   Neurologic: non-focal neurologic examination.   Psychiatric oriented to person, place and time .      Results/Data  Latest creatinine was 1.85    A/P    Mr Caraballo is a 58 year old man with a PMH significant for HFpEF,CKD  with  hHTN, DM, and morbid obesity who has been losing weight with medical management , exercise and managing his food intake   He is here for a routine follow up visit   Has no CV complaints , his BP has been well controlled during the prior office visits with PCP .   Most recent echocardiogram from 2023 shows a normal EF of 55-60%    CV/HTN   Continue current medical regimen he is on   Will see him back in follow up in one year   Congratulated him on committing to a healthier lifestyle and to losing weight

## 2023-12-19 NOTE — PATIENT INSTRUCTIONS
To reach Dr. Villaseñor's office please call [phone] ([place]). Fax [phone]. Call [phone] to schedule an appointment. You may also contact the HF RNs at [email or web address]     Thank you for coming to your appointment today. If you have any questions or need cardiac medication refills, please call the Heart Failure Office at [phone] option [phone].   You may also contact the HF RNs at [email or web address]      We will renew your medications  Please schedule a follow up appointment in a year

## 2023-12-20 ENCOUNTER — PHARMACY VISIT (OUTPATIENT)
Dept: PHARMACY | Facility: CLINIC | Age: 58
End: 2023-12-20
Payer: COMMERCIAL

## 2023-12-21 PROCEDURE — RXMED WILLOW AMBULATORY MEDICATION CHARGE

## 2023-12-22 ENCOUNTER — PHARMACY VISIT (OUTPATIENT)
Dept: PHARMACY | Facility: CLINIC | Age: 58
End: 2023-12-22
Payer: COMMERCIAL

## 2023-12-28 ENCOUNTER — APPOINTMENT (OUTPATIENT)
Dept: PHARMACY | Facility: HOSPITAL | Age: 58
End: 2023-12-28
Payer: COMMERCIAL

## 2023-12-28 ENCOUNTER — TELEPHONE (OUTPATIENT)
Dept: PHARMACY | Facility: HOSPITAL | Age: 58
End: 2023-12-28

## 2023-12-28 NOTE — TELEPHONE ENCOUNTER
Patient has 2 more weeks of trulicity, then he will change to mounjaro.     Discussed follow up in 1 month.     Thanks,   Theresa Davis

## 2024-01-03 PROCEDURE — RXMED WILLOW AMBULATORY MEDICATION CHARGE

## 2024-01-04 ENCOUNTER — PHARMACY VISIT (OUTPATIENT)
Dept: PHARMACY | Facility: CLINIC | Age: 59
End: 2024-01-04
Payer: COMMERCIAL

## 2024-01-04 ENCOUNTER — TELEPHONE (OUTPATIENT)
Dept: PRIMARY CARE | Facility: CLINIC | Age: 59
End: 2024-01-04
Payer: COMMERCIAL

## 2024-01-04 NOTE — TELEPHONE ENCOUNTER
Patient tested positive for covid on 12/30/23 and has quarantined all week and would like to return to work on Monday Jan. 8, 2023

## 2024-01-24 ENCOUNTER — OFFICE VISIT (OUTPATIENT)
Dept: NEPHROLOGY | Facility: CLINIC | Age: 59
End: 2024-01-24
Payer: COMMERCIAL

## 2024-01-24 VITALS
HEIGHT: 72 IN | SYSTOLIC BLOOD PRESSURE: 128 MMHG | BODY MASS INDEX: 42.66 KG/M2 | HEART RATE: 75 BPM | DIASTOLIC BLOOD PRESSURE: 68 MMHG | WEIGHT: 315 LBS

## 2024-01-24 DIAGNOSIS — I12.9 HYPERTENSIVE CHRONIC KIDNEY DISEASE WITH STAGE 1 THROUGH STAGE 4 CHRONIC KIDNEY DISEASE, OR UNSPECIFIED CHRONIC KIDNEY DISEASE: Primary | ICD-10-CM

## 2024-01-24 DIAGNOSIS — I50.30 HEART FAILURE WITH PRESERVED EJECTION FRACTION, UNSPECIFIED HF CHRONICITY (MULTI): ICD-10-CM

## 2024-01-24 PROCEDURE — 4010F ACE/ARB THERAPY RXD/TAKEN: CPT | Performed by: INTERNAL MEDICINE

## 2024-01-24 PROCEDURE — 3066F NEPHROPATHY DOC TX: CPT | Performed by: INTERNAL MEDICINE

## 2024-01-24 PROCEDURE — 3008F BODY MASS INDEX DOCD: CPT | Performed by: INTERNAL MEDICINE

## 2024-01-24 PROCEDURE — 99214 OFFICE O/P EST MOD 30 MIN: CPT | Performed by: INTERNAL MEDICINE

## 2024-01-24 PROCEDURE — 1036F TOBACCO NON-USER: CPT | Performed by: INTERNAL MEDICINE

## 2024-01-24 PROCEDURE — 3074F SYST BP LT 130 MM HG: CPT | Performed by: INTERNAL MEDICINE

## 2024-01-24 PROCEDURE — 3078F DIAST BP <80 MM HG: CPT | Performed by: INTERNAL MEDICINE

## 2024-01-24 RX ORDER — HYDRALAZINE HYDROCHLORIDE 100 MG/1
50 TABLET, FILM COATED ORAL 2 TIMES DAILY
Qty: 90 TABLET | Refills: 3 | Status: SHIPPED | OUTPATIENT
Start: 2024-01-24 | End: 2025-01-23

## 2024-01-24 ASSESSMENT — PAIN SCALES - GENERAL: PAINLEVEL: 0-NO PAIN

## 2024-01-24 NOTE — PROGRESS NOTES
Shola Caraballo is a 58 y.o. male here in follow up for CKD    Subjective   Doing well now, except for body aches. Had COVID earlier this month. No chest pains, dyspnea, fevers, cough. Some leg swelling, but no more than usual. Saw PCP and had blood work done recently, so will not repeat today, but do in early March. We reviewed the medications and discussed that taking hydralazine once a day is not sufficient due to medication half life. Patient reports home BPs all below 130, even later in the day, but we agreed to change hydralazine from 100 mg daily to 50 mg BID. Evaluated in the summer for low MCV: no Hemoglobin S present:     Hemoglobin A  % 65.0   Hemoglobin F  % 1.0   Hemoglobin C  % 30.6   Hemoglobin A2  % 3.4     Specimen Collected: 06/16/23 09:44 Last Resulted: 06/17/23 16:43     ROS  All the rest reviewed and negative    Objective     Vital signs    /68 (BP Location: Left arm, Patient Position: Sitting, BP Cuff Size: Thigh)   Pulse 75   Ht 1.829 m (6')   Wt 149 kg (328 lb)   BMI 44.48 kg/m²     Physical Exam  Constitutional:  well appearing, in NAD  Psychiatric:  appropriate mood and behavior    HEENT: NC/AT, clear sclerae, moist mucous membranes  Cardiovascular: distant S1, S2, RRR,  no murmurs, gallop or rubs  Pulmonary:  Normal breath sounds bilateral  Extremities: trace pretibial edema  Skin:  warm and dry    Meds    Current Outpatient Medications:     Accu-Chek Guide Glucose Meter [place], , Disp: , Rfl:     alcohol swabs pads, medicated, USE AS DIRECTED FOUR TIMES A DAY WHEN CHECKING BLOOD GLUCOSE, Disp: 400 each, Rfl: 3    allopurinol (Zyloprim) 300 mg tablet, Take 1 tablet by mouth once daily, Disp: 90 tablet, Rfl: 3    amLODIPine (Norvasc) 10 mg tablet, Take 1 tablet (10 mg) by mouth once daily., Disp: 90 tablet, Rfl: 3    ascorbic acid (Vitamin C) 1,000 mg tablet, Take 1 tablet (1,000 mg) by mouth once daily., Disp: , Rfl:     blood sugar diagnostic strip, USE 1 STRIP AS DIRECTED TO  CHECK BLOOD GLUCOSE FOUR TIMES A DAY, Disp: 400 each, Rfl: 3    bumetanide (Bumex) 2 mg tablet, Take 1 tablet (2 mg) by mouth once daily., Disp: 90 tablet, Rfl: 3    chlorthalidone (Hygroton) 25 mg tablet, Take 1 tablet by mouth daily, Disp: 90 tablet, Rfl: 3    cholecalciferol (Vitamin D-3) 50 mcg (2,000 unit) capsule, Take 1 capsule (50 mcg) by mouth once daily., Disp: 90 capsule, Rfl: 3    colchicine 0.6 mg tablet, Take 1 tablet (0.6 mg) by mouth once daily as needed for gout flare., Disp: 10 tablet, Rfl: 1    dapagliflozin propanediol (Farxiga) 10 mg, TAKE 1 TABLET BY MOUTH EVERY MORNING, Disp: 90 tablet, Rfl: 3    ferrous sulfate 325 (65 Fe) MG tablet, 3 tablet DAILY (route: oral), Disp: , Rfl:     glyBURIDE (Diabeta) 5 mg tablet, TAKE 2 TABLETS BY MOUTH TWO TIMES A DAY BEFORE BREAKFAST AND SUPPER, Disp: 360 tablet, Rfl: 3    isosorbide dinitrate (Isordil) 10 mg tablet, Take 1 tablet (10 mg) by mouth once daily., Disp: 90 tablet, Rfl: 3    lancets misc, USE AS DIRECTED TO CHECK BLOOD GLUCOSE FOUR TIMES A DAY, Disp: 400 each, Rfl: 3    lisinopril 40 mg tablet, TAKE 1 TABLET BY MOUTH ONCE DAILY, Disp: 90 tablet, Rfl: 3    metoprolol succinate XL (Toprol-XL) 100 mg 24 hr tablet, TAKE 1 TABLET BY MOUTH ONCE DAILY, Disp: 90 tablet, Rfl: 3    multivitamin with minerals (DAILY VITAMIN FORMULA-MINERALS) tablet, 1 tablet DAILY (route: oral), Disp: , Rfl:     pantoprazole (ProtoNix) 40 mg EC tablet, Take 1 tablet (40 mg) by mouth once daily., Disp: 90 tablet, Rfl: 1    tirzepatide (Mounjaro) 5 mg/0.5 mL pen injector, Inject 5 mg under the skin 1 (one) time per week., Disp: 2 mL, Rfl: 1    hydrALAZINE (Apresoline) 100 mg tablet, Take 1/2 tablet (50 mg) by mouth 2 times a day., Disp: 90 tablet, Rfl: 3     Allergies  No Known Allergies     Results  Recent Results (from the past 1680 hour(s))   CBC    Collection Time: 12/11/23 11:53 AM   Result Value Ref Range    WBC 8.9 4.4 - 11.3 x10*3/uL    nRBC 0.0 0.0 - 0.0 /100 WBCs     RBC 5.09 4.50 - 5.90 x10*6/uL    Hemoglobin 11.4 (L) 13.5 - 17.5 g/dL    Hematocrit 35.4 (L) 41.0 - 52.0 %    MCV 70 (L) 80 - 100 fL    MCH 22.4 (L) 26.0 - 34.0 pg    MCHC 32.2 32.0 - 36.0 g/dL    RDW 18.4 (H) 11.5 - 14.5 %    Platelets 341 150 - 450 x10*3/uL   Comprehensive Metabolic Panel    Collection Time: 12/11/23 11:53 AM   Result Value Ref Range    Glucose 69 (L) 74 - 99 mg/dL    Sodium 140 136 - 145 mmol/L    Potassium 4.2 3.5 - 5.3 mmol/L    Chloride 102 98 - 107 mmol/L    Bicarbonate 25 21 - 32 mmol/L    Anion Gap 17 10 - 20 mmol/L    Urea Nitrogen 53 (H) 6 - 23 mg/dL    Creatinine 1.85 (H) 0.50 - 1.30 mg/dL    eGFR 42 (L) >60 mL/min/1.73m*2    Calcium 9.8 8.6 - 10.6 mg/dL    Albumin 4.6 3.4 - 5.0 g/dL    Alkaline Phosphatase 76 33 - 120 U/L    Total Protein 8.3 (H) 6.4 - 8.2 g/dL    AST 25 9 - 39 U/L    Bilirubin, Total 0.4 0.0 - 1.2 mg/dL    ALT 16 10 - 52 U/L   Lipid Panel    Collection Time: 12/11/23 11:53 AM   Result Value Ref Range    Cholesterol 151 0 - 199 mg/dL    HDL-Cholesterol 30.9 mg/dL    Cholesterol/HDL Ratio 4.9     LDL Calculated 96 <=99 mg/dL    VLDL 24 0 - 40 mg/dL    Triglycerides 120 0 - 149 mg/dL    Non HDL Cholesterol 120 0 - 149 mg/dL   TSH with reflex to Free T4 if abnormal    Collection Time: 12/11/23 11:53 AM   Result Value Ref Range    Thyroid Stimulating Hormone 2.52 0.44 - 3.98 mIU/L   Vitamin D 25-Hydroxy,Total (for eval of Vitamin D levels)    Collection Time: 12/11/23 11:53 AM   Result Value Ref Range    Vitamin D, 25-Hydroxy, Total 32 30 - 100 ng/mL   Hemoglobin A1C    Collection Time: 12/11/23 11:53 AM   Result Value Ref Range    Hemoglobin A1C 7.6 (H) see below %    Estimated Average Glucose 171 Not Established mg/dL   Albumin , Urine Random    Collection Time: 12/11/23 11:53 AM   Result Value Ref Range    Albumin, Urine Random 7.8 Not established mg/L    Creatinine, Urine Random 27.5 20.0 - 370.0 mg/dL    Albumin/Creatine Ratio 28.4 <30.0 ug/mg Creat          Assessment  and Plan  Impression:  1. CKD stage G3b/A1, likely diabetic and /or hypertensive kidney disease. Secondary FSGS and secondary amyloidosis possible but unlikely. On maximal dose of ACEi . Last s.creatinine 1.85 mg/dl in December (above) stable. No albuminuria per spot studies at the same time.  2. HTN -today on target.   3. Hyperparathyroidism of CKD and hypovitaminosis D - on vitamin D supplements.     Plan/recommendations:  - continue antihypertensives as are except change Hydralazine to 50 mg BID  - repeat PTH level with next blood work(in March)- ordered  - follow up with PCP, endocrinology and cardiology as scheduled     RTC in follow up in 6 months.     Jaida Baez MD

## 2024-01-24 NOTE — PATIENT INSTRUCTIONS
Labs when you see PCP, in early march.  Hydralazine twice daily, as we discussed.  See you back in 4-6 months.

## 2024-01-25 ENCOUNTER — TELEMEDICINE (OUTPATIENT)
Dept: PHARMACY | Facility: HOSPITAL | Age: 59
End: 2024-01-25
Payer: COMMERCIAL

## 2024-01-25 DIAGNOSIS — E11.9 DIABETES MELLITUS TYPE 2 WITHOUT RETINOPATHY (MULTI): ICD-10-CM

## 2024-01-25 PROCEDURE — RXMED WILLOW AMBULATORY MEDICATION CHARGE

## 2024-01-25 RX ORDER — TIRZEPATIDE 5 MG/.5ML
5 INJECTION, SOLUTION SUBCUTANEOUS
Qty: 2 ML | Refills: 1 | Status: SHIPPED | OUTPATIENT
Start: 2024-01-25 | End: 2024-02-19 | Stop reason: SDUPTHER

## 2024-01-25 NOTE — PROGRESS NOTES
Pharmacist Clinic: Diabetes Management  Shola Caraballo is a 58 y.o. male was referred to Clinical Pharmacy Team for diabetes management.     Referring Provider: Christopher D'Amico, DO     HISTORY OF PRESENT ILLNESS  Approximate Date of Diagnosis: 5 + years   Known complications due to diabetes included chronic kidney disease, obesity     Diet:   - 2-3 meals per day  - Breakfast: skips  - Lunch/Dinner: chicken salad, hamburger, etc.   - Drinks: water, zero gatorde    LAB REVIEW   Glucose (mg/dL)   Date Value   12/11/2023 69 (L)   09/14/2023 99   06/16/2023 81   06/14/2023 78     Hemoglobin A1C (%)   Date Value   12/11/2023 7.6 (H)   09/14/2023 6.9 (A)   06/16/2023 7.5 (A)   01/12/2023 14.1 (A)     Bicarbonate (mmol/L)   Date Value   12/11/2023 25   09/14/2023 28   06/16/2023 29   06/14/2023 30     Urea Nitrogen (mg/dL)   Date Value   12/11/2023 53 (H)   09/14/2023 65 (H)   06/16/2023 56 (H)   06/14/2023 50 (H)     Creatinine (mg/dL)   Date Value   12/11/2023 1.85 (H)   09/14/2023 1.98 (H)   06/16/2023 2.12 (H)   06/14/2023 1.59 (H)     Lab Results   Component Value Date    HGBA1C 7.6 (H) 12/11/2023    HGBA1C 6.9 (A) 09/14/2023    HGBA1C 7.5 (A) 06/16/2023     Lab Results   Component Value Date    CHOL 151 12/11/2023    CHOL 173 09/14/2023    CHOL 183 02/15/2023     Lab Results   Component Value Date    HDL 30.9 12/11/2023    HDL 30.5 (A) 09/14/2023    HDL 39.3 (A) 02/15/2023     Lab Results   Component Value Date    LDLCALC 96 12/11/2023     Lab Results   Component Value Date    TRIG 120 12/11/2023    TRIG 177 (H) 09/14/2023    TRIG 244 (H) 02/15/2023       DIABETES ASSESSMENT    CURRENT PHARMACOTHERAPY  - mounjaro 5 mg under the skin once weekly   - farxiga 10 mg once daily     SECONDARY PREVENTION  - Statin? No  - ACE-I/ARB? No    HISTORICAL PHARMACOTHERAPY  - metformin: discontinued due to kidney function   - tresiba 20 units once daily: non-adherent (2 injections per week)  - aspart 20 units (patient thinks):  non-adherent (maybe 1 injection per week)     SMBG MEASUREMENTS: 194     DISCUSSION:   Diabetes:   Mounjaro: patient is tolerating the medication, he reports diarrhea and minimal appetite    RECOMMENDATIONS/PLAN  1. Patients diabetes is well controlled with most recent A1c of 6.9 % (goal < 7 %).   - Continue all meds under the continuation of care with the referring provider and clinical pharmacy team.    2. Future considerations:  - initiate rosuvastatin     Clinical Pharmacist follow up: 4 weeks     Thank you,  Theresa Davis, PharmD    Verbal consent to manage patient's drug therapy was obtained from the patient. They were informed they may decline to participate or withdraw from participation in pharmacy services at any time.

## 2024-01-26 ENCOUNTER — PHARMACY VISIT (OUTPATIENT)
Dept: PHARMACY | Facility: CLINIC | Age: 59
End: 2024-01-26
Payer: COMMERCIAL

## 2024-02-12 PROCEDURE — RXMED WILLOW AMBULATORY MEDICATION CHARGE

## 2024-02-13 ENCOUNTER — APPOINTMENT (OUTPATIENT)
Dept: RHEUMATOLOGY | Facility: CLINIC | Age: 59
End: 2024-02-13
Payer: COMMERCIAL

## 2024-02-15 ENCOUNTER — APPOINTMENT (OUTPATIENT)
Dept: PHARMACY | Facility: HOSPITAL | Age: 59
End: 2024-02-15
Payer: COMMERCIAL

## 2024-02-15 ENCOUNTER — PHARMACY VISIT (OUTPATIENT)
Dept: PHARMACY | Facility: CLINIC | Age: 59
End: 2024-02-15
Payer: COMMERCIAL

## 2024-02-16 ENCOUNTER — PHARMACY VISIT (OUTPATIENT)
Dept: PHARMACY | Facility: CLINIC | Age: 59
End: 2024-02-16
Payer: COMMERCIAL

## 2024-02-16 PROCEDURE — RXMED WILLOW AMBULATORY MEDICATION CHARGE

## 2024-02-19 ENCOUNTER — TELEMEDICINE (OUTPATIENT)
Dept: PHARMACY | Facility: HOSPITAL | Age: 59
End: 2024-02-19
Payer: COMMERCIAL

## 2024-02-19 DIAGNOSIS — E11.9 DIABETES MELLITUS TYPE 2 WITHOUT RETINOPATHY (MULTI): ICD-10-CM

## 2024-02-19 RX ORDER — TIRZEPATIDE 5 MG/.5ML
5 INJECTION, SOLUTION SUBCUTANEOUS
Qty: 2 ML | Refills: 1 | Status: SHIPPED | OUTPATIENT
Start: 2024-02-19 | End: 2024-03-12 | Stop reason: DRUGHIGH

## 2024-02-19 NOTE — PROGRESS NOTES
Pharmacist Clinic: Diabetes Management  Shola Caraballo is a 58 y.o. male was referred to Clinical Pharmacy Team for diabetes management.     Referring Provider: Christopher D'Amico, DO     HISTORY OF PRESENT ILLNESS  Approximate Date of Diagnosis: 5 + years   Known complications due to diabetes included chronic kidney disease, obesity     Diet:   - 2-3 meals per day  - Breakfast: skips  - Lunch/Dinner: chicken salad, hamburger, etc.   - Drinks: water, zero gatorde    LAB REVIEW   Glucose (mg/dL)   Date Value   12/11/2023 69 (L)   09/14/2023 99   06/16/2023 81   06/14/2023 78     Hemoglobin A1C (%)   Date Value   12/11/2023 7.6 (H)   09/14/2023 6.9 (A)   06/16/2023 7.5 (A)   01/12/2023 14.1 (A)     Bicarbonate (mmol/L)   Date Value   12/11/2023 25   09/14/2023 28   06/16/2023 29   06/14/2023 30     Urea Nitrogen (mg/dL)   Date Value   12/11/2023 53 (H)   09/14/2023 65 (H)   06/16/2023 56 (H)   06/14/2023 50 (H)     Creatinine (mg/dL)   Date Value   12/11/2023 1.85 (H)   09/14/2023 1.98 (H)   06/16/2023 2.12 (H)   06/14/2023 1.59 (H)     Lab Results   Component Value Date    HGBA1C 7.6 (H) 12/11/2023    HGBA1C 6.9 (A) 09/14/2023    HGBA1C 7.5 (A) 06/16/2023     Lab Results   Component Value Date    CHOL 151 12/11/2023    CHOL 173 09/14/2023    CHOL 183 02/15/2023     Lab Results   Component Value Date    HDL 30.9 12/11/2023    HDL 30.5 (A) 09/14/2023    HDL 39.3 (A) 02/15/2023     Lab Results   Component Value Date    LDLCALC 96 12/11/2023     Lab Results   Component Value Date    TRIG 120 12/11/2023    TRIG 177 (H) 09/14/2023    TRIG 244 (H) 02/15/2023       DIABETES ASSESSMENT    CURRENT PHARMACOTHERAPY  - mounjaro 5 mg under the skin once weekly   - farxiga 10 mg once daily     SECONDARY PREVENTION  - Statin? No  - ACE-I/ARB? No    HISTORICAL PHARMACOTHERAPY  - metformin: discontinued due to kidney function   - tresiba 20 units once daily: non-adherent (2 injections per week)  - aspart 20 units (patient thinks):  non-adherent (maybe 1 injection per week)     SMBG MEASUREMENTS:  range    DISCUSSION:   Diabetes:   Mounjaro: patient is tolerating the medication, he reports diarrhea and minimal appetite  If next A1c comes back elevated, we will increase mounjaro depending on tolerability     Employee Health Diabetes Program (VBID)  - Patient enrolled in  Employee diabetes program for $0 co-pays on diabetes medications/supplies. Enrollment should be active in 2-4 weeks  - Requested VBID enrollment date: 2/19/24    RECOMMENDATIONS/PLAN  1. Patients diabetes is well controlled with most recent A1c of 7.6 % (goal < 7 %).   - Continue all meds under the continuation of care with the referring provider and clinical pharmacy team.    2. Future considerations:  - initiate rosuvastatin     Clinical Pharmacist follow up: 4 weeks     Thank you,  Theresa Davis, PharmD    Verbal consent to manage patient's drug therapy was obtained from the patient. They were informed they may decline to participate or withdraw from participation in pharmacy services at any time.

## 2024-02-20 DIAGNOSIS — M10.9 GOUT, UNSPECIFIED CAUSE, UNSPECIFIED CHRONICITY, UNSPECIFIED SITE: Primary | ICD-10-CM

## 2024-02-20 PROCEDURE — RXMED WILLOW AMBULATORY MEDICATION CHARGE

## 2024-02-20 RX ORDER — COLCHICINE 0.6 MG/1
0.6 TABLET ORAL DAILY PRN
Qty: 10 TABLET | Refills: 1 | Status: SHIPPED | OUTPATIENT
Start: 2024-02-20

## 2024-02-21 ENCOUNTER — PHARMACY VISIT (OUTPATIENT)
Dept: PHARMACY | Facility: CLINIC | Age: 59
End: 2024-02-21
Payer: COMMERCIAL

## 2024-02-26 DIAGNOSIS — E11.9 TYPE 2 DIABETES MELLITUS WITHOUT COMPLICATION, WITH LONG-TERM CURRENT USE OF INSULIN (MULTI): Primary | ICD-10-CM

## 2024-02-26 DIAGNOSIS — Z79.4 TYPE 2 DIABETES MELLITUS WITHOUT COMPLICATION, WITH LONG-TERM CURRENT USE OF INSULIN (MULTI): Primary | ICD-10-CM

## 2024-02-26 RX ORDER — GLYBURIDE 5 MG/1
TABLET ORAL
Qty: 360 TABLET | Refills: 0 | Status: SHIPPED | OUTPATIENT
Start: 2024-02-26 | End: 2025-02-24

## 2024-02-27 PROCEDURE — RXMED WILLOW AMBULATORY MEDICATION CHARGE

## 2024-02-28 ENCOUNTER — PHARMACY VISIT (OUTPATIENT)
Dept: PHARMACY | Facility: CLINIC | Age: 59
End: 2024-02-28
Payer: COMMERCIAL

## 2024-03-04 PROCEDURE — RXMED WILLOW AMBULATORY MEDICATION CHARGE

## 2024-03-05 ENCOUNTER — PHARMACY VISIT (OUTPATIENT)
Dept: PHARMACY | Facility: CLINIC | Age: 59
End: 2024-03-05
Payer: COMMERCIAL

## 2024-03-07 ENCOUNTER — OFFICE VISIT (OUTPATIENT)
Dept: PRIMARY CARE | Facility: CLINIC | Age: 59
End: 2024-03-07
Payer: COMMERCIAL

## 2024-03-07 VITALS
DIASTOLIC BLOOD PRESSURE: 67 MMHG | WEIGHT: 315 LBS | HEART RATE: 75 BPM | SYSTOLIC BLOOD PRESSURE: 108 MMHG | OXYGEN SATURATION: 95 % | HEIGHT: 72 IN | BODY MASS INDEX: 42.66 KG/M2

## 2024-03-07 DIAGNOSIS — E11.9 TYPE 2 DIABETES MELLITUS WITHOUT COMPLICATION, WITH LONG-TERM CURRENT USE OF INSULIN (MULTI): ICD-10-CM

## 2024-03-07 DIAGNOSIS — Z79.4 TYPE 2 DIABETES MELLITUS WITHOUT COMPLICATION, WITH LONG-TERM CURRENT USE OF INSULIN (MULTI): ICD-10-CM

## 2024-03-07 DIAGNOSIS — E55.9 VITAMIN D DEFICIENCY: ICD-10-CM

## 2024-03-07 DIAGNOSIS — D64.9 ANEMIA, UNSPECIFIED TYPE: ICD-10-CM

## 2024-03-07 DIAGNOSIS — Z00.00 WELLNESS EXAMINATION: ICD-10-CM

## 2024-03-07 DIAGNOSIS — Z79.4 TYPE 2 DIABETES MELLITUS WITH CHRONIC KIDNEY DISEASE, WITH LONG-TERM CURRENT USE OF INSULIN, UNSPECIFIED CKD STAGE (MULTI): ICD-10-CM

## 2024-03-07 DIAGNOSIS — M10.9 GOUT, UNSPECIFIED CAUSE, UNSPECIFIED CHRONICITY, UNSPECIFIED SITE: ICD-10-CM

## 2024-03-07 DIAGNOSIS — I50.30 HEART FAILURE WITH PRESERVED EJECTION FRACTION, UNSPECIFIED HF CHRONICITY (MULTI): ICD-10-CM

## 2024-03-07 DIAGNOSIS — E11.22 TYPE 2 DIABETES MELLITUS WITH CHRONIC KIDNEY DISEASE, WITH LONG-TERM CURRENT USE OF INSULIN, UNSPECIFIED CKD STAGE (MULTI): ICD-10-CM

## 2024-03-07 DIAGNOSIS — I50.32 CHRONIC HEART FAILURE WITH PRESERVED EJECTION FRACTION (MULTI): ICD-10-CM

## 2024-03-07 DIAGNOSIS — I10 HTN (HYPERTENSION), BENIGN: Primary | ICD-10-CM

## 2024-03-07 DIAGNOSIS — Z12.5 SCREENING FOR PROSTATE CANCER: ICD-10-CM

## 2024-03-07 DIAGNOSIS — I50.9 CONGESTIVE HEART FAILURE, UNSPECIFIED HF CHRONICITY, UNSPECIFIED HEART FAILURE TYPE (MULTI): ICD-10-CM

## 2024-03-07 PROBLEM — R31.9 HEMATURIA: Status: ACTIVE | Noted: 2024-03-07

## 2024-03-07 PROBLEM — Z20.822 CONTACT WITH AND (SUSPECTED) EXPOSURE TO COVID-19: Status: ACTIVE | Noted: 2023-03-05

## 2024-03-07 PROBLEM — I49.1 PREMATURE ATRIAL CONTRACTION: Status: ACTIVE | Noted: 2023-03-05

## 2024-03-07 PROBLEM — Z86.79 HISTORY OF HEART FAILURE: Status: ACTIVE | Noted: 2024-03-07

## 2024-03-07 PROBLEM — K62.5 RECTAL HEMORRHAGE: Status: ACTIVE | Noted: 2024-03-07

## 2024-03-07 PROCEDURE — 99214 OFFICE O/P EST MOD 30 MIN: CPT | Performed by: STUDENT IN AN ORGANIZED HEALTH CARE EDUCATION/TRAINING PROGRAM

## 2024-03-07 PROCEDURE — RXMED WILLOW AMBULATORY MEDICATION CHARGE

## 2024-03-07 PROCEDURE — 4010F ACE/ARB THERAPY RXD/TAKEN: CPT | Performed by: STUDENT IN AN ORGANIZED HEALTH CARE EDUCATION/TRAINING PROGRAM

## 2024-03-07 PROCEDURE — 3074F SYST BP LT 130 MM HG: CPT | Performed by: STUDENT IN AN ORGANIZED HEALTH CARE EDUCATION/TRAINING PROGRAM

## 2024-03-07 PROCEDURE — 3078F DIAST BP <80 MM HG: CPT | Performed by: STUDENT IN AN ORGANIZED HEALTH CARE EDUCATION/TRAINING PROGRAM

## 2024-03-07 PROCEDURE — 1036F TOBACCO NON-USER: CPT | Performed by: STUDENT IN AN ORGANIZED HEALTH CARE EDUCATION/TRAINING PROGRAM

## 2024-03-07 PROCEDURE — 99396 PREV VISIT EST AGE 40-64: CPT | Performed by: STUDENT IN AN ORGANIZED HEALTH CARE EDUCATION/TRAINING PROGRAM

## 2024-03-07 RX ORDER — PANTOPRAZOLE SODIUM 40 MG/1
40 TABLET, DELAYED RELEASE ORAL DAILY
Qty: 90 TABLET | Refills: 3 | Status: SHIPPED | OUTPATIENT
Start: 2024-03-07 | End: 2025-03-07

## 2024-03-07 RX ORDER — ALLOPURINOL 300 MG/1
300 TABLET ORAL DAILY
Qty: 90 TABLET | Refills: 3 | Status: SHIPPED | OUTPATIENT
Start: 2024-03-07

## 2024-03-07 ASSESSMENT — COLUMBIA-SUICIDE SEVERITY RATING SCALE - C-SSRS
1. IN THE PAST MONTH, HAVE YOU WISHED YOU WERE DEAD OR WISHED YOU COULD GO TO SLEEP AND NOT WAKE UP?: NO
2. HAVE YOU ACTUALLY HAD ANY THOUGHTS OF KILLING YOURSELF?: NO
6. HAVE YOU EVER DONE ANYTHING, STARTED TO DO ANYTHING, OR PREPARED TO DO ANYTHING TO END YOUR LIFE?: NO

## 2024-03-07 ASSESSMENT — PATIENT HEALTH QUESTIONNAIRE - PHQ9
2. FEELING DOWN, DEPRESSED OR HOPELESS: NOT AT ALL
1. LITTLE INTEREST OR PLEASURE IN DOING THINGS: NOT AT ALL
SUM OF ALL RESPONSES TO PHQ9 QUESTIONS 1 AND 2: 0

## 2024-03-07 ASSESSMENT — ENCOUNTER SYMPTOMS
LOSS OF SENSATION IN FEET: 0
DEPRESSION: 0
OCCASIONAL FEELINGS OF UNSTEADINESS: 0

## 2024-03-07 NOTE — PROGRESS NOTES
HPI: 58-year-old male presenting for follow-up on chronic issues, CPE.  Patient doing relatively well without any significant new concerns or interval changes.    HTN, CHF  Stable, tolerating current regimen well.     DMII  Stable, doing very well with no regimen, no longer taking insulin.     CKD, anemia  Stable, follows with nephrology.      Gout  Stable, tolerating current regimen well.     Vitamin D deficiency  Stable, tolerating current regimen well.     RODDY  Reports compliance with CPAP     GERD, chronic diarrhea  Stable    SocHx:   - Smoking: Quit over 20 years ago    Denies HA, changes in vision, chest pain, SOB/ALMEIDA, palpitations, N/V/D/C, dysuria/hematuria, hematochezia/melena, paresthesias    12 point ROS reviewed and negative other than as stated in HPI      General: Alert, oriented, pleasant, in no acute distress  HEENT:      Head: normocephalic, atraumatic;      eyes: EOMI, no scleral icterus;   Neck: soft, supple, non-tender, no masses appreciated  CV: Heart with regular rate and rhythm, normal S1/S2, no murmurs  Lungs: CTAB without wheezing, rhonchi or rales; good respiratory effort, no increased work of breathing  Abdomen: soft, non-tender, non-distended, no masses appreciated, limited by body habitus  Extremities: no edema, wearing compression stockings, no cyanosis  Neuro: Cranial nerves grossly intact; alert and oriented, normal gait  Psych: Appropriate mood and affect    #HM  -CBC, CMP, Lipid panel, Vit D, TSH with reflex T4, PSA  -Vaccines:       Flu: UTD      Shingrix: Reports never having chickenpox as a child, does not want varicella titers      Pneumococcal: At 66      Tdap: UTD 2018  -Lung cancer screening with low-dose CT: Quit over 20 years ago  -Colonoscopy: 2015, 10 year plan  -AAA screening: at 66    #HTN #CHF  - At goal in office  - Continue amlodipine 10 mg daily, chlorthalidone 25 mg daily, hydralazine 100 mg twice daily, metoprolol succinate 100 mg daily, isosorbide dinitrate 10 mg  twice daily  - Continue to follow with cardiology     #DMII  -Last A1c 7.6, 12/2023  -Continue Mounjaro 5 mg weekly, Farxiga 10 mg daily  - Repeat A1c, albumin  - Continue to titrate up regimen through [facility] pharmacist    #HLD  -Recommended rosuvastatin in the past, wants to work on diet  - Repeat lipid panel     # CKD #Anemia  - Likely thalassemia  - Continue to follow with nephrology  -Repeat CBC     #Gout  - Continue allopurinol 300 mg daily, colchicine as needed, nephrotoxic warnings have been given     #Vitamin D deficiency  - Continue vitamin D2 2000 units daily  -Repeat lab     #RODDY  - Continue with CPAP, reports compliance     #GERD  - Continue pantoprazole 40 mg daily     #Chronic diarrhea  -Continue daily fiber     F/U 6 months, sooner if indicated    Chris D'Amico, DO

## 2024-03-08 ENCOUNTER — PHARMACY VISIT (OUTPATIENT)
Dept: PHARMACY | Facility: CLINIC | Age: 59
End: 2024-03-08
Payer: COMMERCIAL

## 2024-03-11 ENCOUNTER — APPOINTMENT (OUTPATIENT)
Dept: PHARMACY | Facility: HOSPITAL | Age: 59
End: 2024-03-11
Payer: COMMERCIAL

## 2024-03-11 ENCOUNTER — LAB (OUTPATIENT)
Dept: LAB | Facility: LAB | Age: 59
End: 2024-03-11
Payer: COMMERCIAL

## 2024-03-11 DIAGNOSIS — I50.30 HEART FAILURE WITH PRESERVED EJECTION FRACTION, UNSPECIFIED HF CHRONICITY (MULTI): ICD-10-CM

## 2024-03-11 DIAGNOSIS — I50.9 CONGESTIVE HEART FAILURE, UNSPECIFIED HF CHRONICITY, UNSPECIFIED HEART FAILURE TYPE (MULTI): ICD-10-CM

## 2024-03-11 DIAGNOSIS — Z79.4 TYPE 2 DIABETES MELLITUS WITHOUT COMPLICATION, WITH LONG-TERM CURRENT USE OF INSULIN (MULTI): ICD-10-CM

## 2024-03-11 DIAGNOSIS — E11.9 TYPE 2 DIABETES MELLITUS WITHOUT COMPLICATION, WITH LONG-TERM CURRENT USE OF INSULIN (MULTI): ICD-10-CM

## 2024-03-11 DIAGNOSIS — Z12.5 SCREENING FOR PROSTATE CANCER: ICD-10-CM

## 2024-03-11 DIAGNOSIS — D64.9 ANEMIA, UNSPECIFIED TYPE: ICD-10-CM

## 2024-03-11 DIAGNOSIS — I10 HTN (HYPERTENSION), BENIGN: ICD-10-CM

## 2024-03-11 DIAGNOSIS — I12.9 HYPERTENSIVE CHRONIC KIDNEY DISEASE WITH STAGE 1 THROUGH STAGE 4 CHRONIC KIDNEY DISEASE, OR UNSPECIFIED CHRONIC KIDNEY DISEASE: ICD-10-CM

## 2024-03-11 LAB
ALBUMIN SERPL BCP-MCNC: 4.8 G/DL (ref 3.4–5)
ANION GAP SERPL CALC-SCNC: 15 MMOL/L (ref 10–20)
APPEARANCE UR: CLEAR
BILIRUB UR STRIP.AUTO-MCNC: NEGATIVE MG/DL
BUN SERPL-MCNC: 47 MG/DL (ref 6–23)
CALCIUM SERPL-MCNC: 9.9 MG/DL (ref 8.6–10.6)
CHLORIDE SERPL-SCNC: 104 MMOL/L (ref 98–107)
CHOLEST SERPL-MCNC: 172 MG/DL (ref 0–199)
CHOLESTEROL/HDL RATIO: 4.8
CO2 SERPL-SCNC: 29 MMOL/L (ref 21–32)
COLOR UR: ABNORMAL
CREAT SERPL-MCNC: 1.7 MG/DL (ref 0.5–1.3)
CREAT UR-MCNC: 104.4 MG/DL (ref 20–370)
EGFRCR SERPLBLD CKD-EPI 2021: 46 ML/MIN/1.73M*2
ERYTHROCYTE [DISTWIDTH] IN BLOOD BY AUTOMATED COUNT: 19.6 % (ref 11.5–14.5)
EST. AVERAGE GLUCOSE BLD GHB EST-MCNC: 146 MG/DL
FERRITIN SERPL-MCNC: 255 NG/ML (ref 20–300)
GLUCOSE SERPL-MCNC: 106 MG/DL (ref 74–99)
GLUCOSE UR STRIP.AUTO-MCNC: ABNORMAL MG/DL
HBA1C MFR BLD: 6.7 %
HCT VFR BLD AUTO: 39.1 % (ref 41–52)
HDLC SERPL-MCNC: 35.6 MG/DL
HGB BLD-MCNC: 12.4 G/DL (ref 13.5–17.5)
IRON SATN MFR SERPL: 16 % (ref 25–45)
IRON SERPL-MCNC: 53 UG/DL (ref 35–150)
KETONES UR STRIP.AUTO-MCNC: NEGATIVE MG/DL
LDLC SERPL CALC-MCNC: 111 MG/DL
LEUKOCYTE ESTERASE UR QL STRIP.AUTO: NEGATIVE
MCH RBC QN AUTO: 23 PG (ref 26–34)
MCHC RBC AUTO-ENTMCNC: 31.7 G/DL (ref 32–36)
MCV RBC AUTO: 73 FL (ref 80–100)
MICROALBUMIN UR-MCNC: 21.7 MG/L
MICROALBUMIN/CREAT UR: 20.8 UG/MG CREAT
NITRITE UR QL STRIP.AUTO: NEGATIVE
NON HDL CHOLESTEROL: 136 MG/DL (ref 0–149)
NRBC BLD-RTO: 0 /100 WBCS (ref 0–0)
PH UR STRIP.AUTO: 6 [PH]
PHOSPHATE SERPL-MCNC: 3.3 MG/DL (ref 2.5–4.9)
PLATELET # BLD AUTO: 300 X10*3/UL (ref 150–450)
POTASSIUM SERPL-SCNC: 4.1 MMOL/L (ref 3.5–5.3)
PROT UR STRIP.AUTO-MCNC: NEGATIVE MG/DL
PSA SERPL-MCNC: 1.99 NG/ML
PTH-INTACT SERPL-MCNC: 141 PG/ML (ref 18.5–88)
RBC # BLD AUTO: 5.38 X10*6/UL (ref 4.5–5.9)
RBC # UR STRIP.AUTO: NEGATIVE /UL
SODIUM SERPL-SCNC: 144 MMOL/L (ref 136–145)
SP GR UR STRIP.AUTO: 1.02
TIBC SERPL-MCNC: 323 UG/DL (ref 240–445)
TRIGL SERPL-MCNC: 129 MG/DL (ref 0–149)
TSH SERPL-ACNC: 3.03 MIU/L (ref 0.44–3.98)
UIBC SERPL-MCNC: 270 UG/DL (ref 110–370)
UROBILINOGEN UR STRIP.AUTO-MCNC: NORMAL MG/DL
VLDL: 26 MG/DL (ref 0–40)
WBC # BLD AUTO: 8.8 X10*3/UL (ref 4.4–11.3)

## 2024-03-11 PROCEDURE — 83036 HEMOGLOBIN GLYCOSYLATED A1C: CPT

## 2024-03-11 PROCEDURE — 83970 ASSAY OF PARATHORMONE: CPT

## 2024-03-11 PROCEDURE — 83550 IRON BINDING TEST: CPT

## 2024-03-11 PROCEDURE — 85027 COMPLETE CBC AUTOMATED: CPT

## 2024-03-11 PROCEDURE — 84153 ASSAY OF PSA TOTAL: CPT

## 2024-03-11 PROCEDURE — 36415 COLL VENOUS BLD VENIPUNCTURE: CPT

## 2024-03-11 PROCEDURE — 82570 ASSAY OF URINE CREATININE: CPT

## 2024-03-11 PROCEDURE — 81003 URINALYSIS AUTO W/O SCOPE: CPT

## 2024-03-11 PROCEDURE — 84443 ASSAY THYROID STIM HORMONE: CPT

## 2024-03-11 PROCEDURE — 80061 LIPID PANEL: CPT

## 2024-03-11 PROCEDURE — 82728 ASSAY OF FERRITIN: CPT

## 2024-03-11 PROCEDURE — 83540 ASSAY OF IRON: CPT

## 2024-03-11 PROCEDURE — 80069 RENAL FUNCTION PANEL: CPT

## 2024-03-11 PROCEDURE — 82043 UR ALBUMIN QUANTITATIVE: CPT

## 2024-03-12 ENCOUNTER — TELEMEDICINE (OUTPATIENT)
Dept: PHARMACY | Facility: HOSPITAL | Age: 59
End: 2024-03-12
Payer: COMMERCIAL

## 2024-03-12 DIAGNOSIS — E11.9 DIABETES MELLITUS TYPE 2 WITHOUT RETINOPATHY (MULTI): Primary | ICD-10-CM

## 2024-03-12 PROCEDURE — RXMED WILLOW AMBULATORY MEDICATION CHARGE

## 2024-03-12 RX ORDER — TIRZEPATIDE 7.5 MG/.5ML
7.5 INJECTION, SOLUTION SUBCUTANEOUS
Qty: 2 ML | Refills: 2 | Status: SHIPPED | OUTPATIENT
Start: 2024-03-12 | End: 2024-05-16 | Stop reason: SDUPTHER

## 2024-03-12 NOTE — PROGRESS NOTES
Pharmacist Clinic: Diabetes Management  Shola Caraballo is a 58 y.o. male was referred to Clinical Pharmacy Team for diabetes management.     Referring Provider: Christopher D'Amico, DO     HISTORY OF PRESENT ILLNESS  Approximate Date of Diagnosis: 5 + years   Known complications due to diabetes included chronic kidney disease, obesity     Diet:   - 2-3 meals per day  - Breakfast: skips  - Lunch/Dinner: chicken salad, hamburger, etc.   - Drinks: water, zero gatorde    LAB REVIEW   Glucose (mg/dL)   Date Value   03/11/2024 106 (H)   12/11/2023 69 (L)   09/14/2023 99   06/16/2023 81   06/14/2023 78     Hemoglobin A1C (%)   Date Value   03/11/2024 6.7 (H)   12/11/2023 7.6 (H)   09/14/2023 6.9 (A)   06/16/2023 7.5 (A)   01/12/2023 14.1 (A)     Bicarbonate (mmol/L)   Date Value   03/11/2024 29   12/11/2023 25   09/14/2023 28   06/16/2023 29   06/14/2023 30     Urea Nitrogen (mg/dL)   Date Value   03/11/2024 47 (H)   12/11/2023 53 (H)   09/14/2023 65 (H)   06/16/2023 56 (H)   06/14/2023 50 (H)     Creatinine (mg/dL)   Date Value   03/11/2024 1.70 (H)   12/11/2023 1.85 (H)   09/14/2023 1.98 (H)   06/16/2023 2.12 (H)   06/14/2023 1.59 (H)     Lab Results   Component Value Date    HGBA1C 6.7 (H) 03/11/2024    HGBA1C 7.6 (H) 12/11/2023    HGBA1C 6.9 (A) 09/14/2023     Lab Results   Component Value Date    CHOL 172 03/11/2024    CHOL 151 12/11/2023    CHOL 173 09/14/2023     Lab Results   Component Value Date    HDL 35.6 03/11/2024    HDL 30.9 12/11/2023    HDL 30.5 (A) 09/14/2023     Lab Results   Component Value Date    LDLCALC 111 (H) 03/11/2024    LDLCALC 96 12/11/2023     Lab Results   Component Value Date    TRIG 129 03/11/2024    TRIG 120 12/11/2023    TRIG 177 (H) 09/14/2023       DIABETES ASSESSMENT    CURRENT PHARMACOTHERAPY  - mounjaro 5 mg under the skin once weekly   - farxiga 10 mg once daily     SECONDARY PREVENTION  - Statin? No  - ACE-I/ARB? No    HISTORICAL PHARMACOTHERAPY  - metformin: discontinued due to  kidney function   - tresiba 20 units once daily: non-adherent (2 injections per week)  - aspart 20 units (patient thinks): non-adherent (maybe 1 injection per week)     SMBG MEASUREMENTS:  range    DISCUSSION:   Diabetes:   A1c came back reflecting good control of your diabetes   Mounjaro: patient is tolerating the medication, he reports diarrhea and minimal appetite  We will increase the dosage for metabolic benefits  Hyperlipidemia:   Patient is unwilling to consider a cholesterol medication at this time   Counseled on the benefits of starting a med, counseled on the risks of high cholesterol     Employee Health Diabetes Program (VBID)  - Patient enrolled in  Employee diabetes program for $0 co-pays on diabetes medications/supplies. Enrollment should be active in 2-4 weeks  - Requested VBID enrollment date: 2/19/24    RECOMMENDATIONS/PLAN  1. Patients diabetes is well controlled with most recent A1c of 6.7 % (goal < 7 %).   - Continue all meds under the continuation of care with the referring provider and clinical pharmacy team.  - Increase mounjaro to 7.5 mg once weekly.     2. Future considerations:  - reconsider rosuvastatin     Clinical Pharmacist follow up: 4 weeks     Thank you,  Theresa Davis, PharmD    Verbal consent to manage patient's drug therapy was obtained from the patient. They were informed they may decline to participate or withdraw from participation in pharmacy services at any time.

## 2024-03-13 ENCOUNTER — TELEPHONE (OUTPATIENT)
Dept: PRIMARY CARE | Facility: CLINIC | Age: 59
End: 2024-03-13
Payer: COMMERCIAL

## 2024-03-13 ENCOUNTER — PHARMACY VISIT (OUTPATIENT)
Dept: PHARMACY | Facility: CLINIC | Age: 59
End: 2024-03-13
Payer: COMMERCIAL

## 2024-03-13 NOTE — TELEPHONE ENCOUNTER
Result Communication    Resulted Orders   Lipid Panel   Result Value Ref Range    Cholesterol 172 0 - 199 mg/dL      Comment:            Age      Desirable   Borderline High   High     0-19 Y     0 - 169       170 - 199     >/= 200    20-24 Y     0 - 189       190 - 224     >/= 225         >24 Y     0 - 199       200 - 239     >/= 240   **All ranges are based on fasting samples. Specific   therapeutic targets will vary based on patient-specific   cardiac risk.    Pediatric guidelines reference:Pediatrics 2011, 128(S5).Adult guidelines reference: NCEP ATPIII Guidelines,MARK ANTHONY 2001, 258:2486-97    Venipuncture immediately after or during the administration of Metamizole may lead to falsely low results. Testing should be performed immediately prior to Metamizole dosing.    HDL-Cholesterol 35.6 mg/dL      Comment:        Age       Very Low   Low     Normal    High    0-19 Y    < 35      < 40     40-45     ----  20-24 Y    ----     < 40      >45      ----        >24 Y      ----     < 40     40-60      >60      Cholesterol/HDL Ratio 4.8       Comment:        Ref Values  Desirable  < 3.4  High Risk  > 5.0    LDL Calculated 111 (H) <=99 mg/dL      Comment:                                  Near   Borderline      AGE      Desirable  Optimal    High     High     Very High     0-19 Y     0 - 109     ---    110-129   >/= 130     ----    20-24 Y     0 - 119     ---    120-159   >/= 160     ----      >24 Y     0 -  99   100-129  130-159   160-189     >/=190      VLDL 26 0 - 40 mg/dL    Triglycerides 129 0 - 149 mg/dL      Comment:         Age         Desirable   Borderline High   High     Very High   0 D-90 D    19 - 174         ----         ----        ----  91 D- 9 Y     0 -  74        75 -  99     >/= 100      ----    10-19 Y     0 -  89        90 - 129     >/= 130      ----    20-24 Y     0 - 114       115 - 149     >/= 150      ----         >24 Y     0 - 149       150 - 199    200- 499    >/= 500    Venipuncture immediately  after or during the administration of Metamizole may lead to falsely low results. Testing should be performed immediately prior to Metamizole dosing.    Non HDL Cholesterol 136 0 - 149 mg/dL      Comment:            Age       Desirable   Borderline High   High     Very High     0-19 Y     0 - 119       120 - 144     >/= 145    >/= 160    20-24 Y     0 - 149       150 - 189     >/= 190      ----         >24 Y    30 mg/dL above LDL Cholesterol goal     TSH with reflex to Free T4 if abnormal   Result Value Ref Range    Thyroid Stimulating Hormone 3.03 0.44 - 3.98 mIU/L    Narrative    TSH testing is performed using different testing methodology at [facility] than at other [facility]. Direct result comparisons should only be made within the same method.     Hemoglobin A1C   Result Value Ref Range    Hemoglobin A1C 6.7 (H) see below %    Estimated Average Glucose 146 Not Established mg/dL    Narrative    Diagnosis of Diabetes-Adults  Non-Diabetic: < or = 5.6%  Increased risk for developing diabetes: 5.7-6.4%  Diagnostic of diabetes: > or = 6.5%    Monitoring of Diabetes  Age (y)....................... Therapeutic Goal (%)  Adults: >18.........................<7.0  Pediatrics: 13-18...................<7.5  Pediatrics: 7-12....................<8.0  Pediatrics: 0-6..................... 7.5-8.5    American Diabetes Association. Diabetes Care 33(S1), Jan 2010       Prostate Specific Antigen   Result Value Ref Range    Prostate Specific AG 1.99 <=4.00 ng/mL    Narrative    The FDA requires that the method used for PSA assay be reported to the physician. Values obtained with different assay methods must not be used interchangeably. This test was performed at [facility] using [employer] PSA method, which is a sandwich immunoassay using chemiluminescence for quantitation. The assay is approved for measurement of prostate-specific antigen (PSA) in serum and may be used in conjunction with  a digital rectal examination in men 50 years and older as an aid in the detection of prostate cancer. 5 Alpha-reductase inhibitors (e.g., Proscar, Finasteride, Avodart, Dutasteride, and Daylin) for the treatment of BPH have been shown to lower PSA levels by an average of 50% after 6 months of treatment.            10:39 AM      Results were successfully communicated with the patient and they acknowledged their understanding.

## 2024-03-13 NOTE — TELEPHONE ENCOUNTER
----- Message from Christopher D'Amico, DO sent at 3/13/2024 10:22 AM EDT -----  Globin A1c at 6.7, improved over the past 3 months.  Continue to increase Mounjaro as tolerated.  Doing relatively well.    LDL at 111, well above diabetic goal of 70, continue to recommend rosuvastatin 10 mg daily.  If amenable, I will send to his pharmacy.    Remaining labs unremarkable.

## 2024-03-28 PROCEDURE — RXMED WILLOW AMBULATORY MEDICATION CHARGE

## 2024-04-05 ENCOUNTER — PHARMACY VISIT (OUTPATIENT)
Dept: PHARMACY | Facility: CLINIC | Age: 59
End: 2024-04-05
Payer: COMMERCIAL

## 2024-04-09 ENCOUNTER — TELEMEDICINE (OUTPATIENT)
Dept: PHARMACY | Facility: HOSPITAL | Age: 59
End: 2024-04-09
Payer: COMMERCIAL

## 2024-04-09 DIAGNOSIS — E11.9 DIABETES MELLITUS TYPE 2 WITHOUT RETINOPATHY (MULTI): Primary | ICD-10-CM

## 2024-04-09 PROCEDURE — RXMED WILLOW AMBULATORY MEDICATION CHARGE

## 2024-04-09 RX ORDER — LANCETS
EACH MISCELLANEOUS
Qty: 400 EACH | Refills: 3 | OUTPATIENT
Start: 2024-04-09 | End: 2025-04-09

## 2024-04-09 RX ORDER — BLOOD SUGAR DIAGNOSTIC
STRIP MISCELLANEOUS
Qty: 400 EACH | Refills: 3 | OUTPATIENT
Start: 2024-04-09 | End: 2025-04-09

## 2024-04-09 RX ORDER — ISOPROPYL ALCOHOL 70 ML/100ML
SWAB TOPICAL
Qty: 400 EACH | Refills: 3 | OUTPATIENT
Start: 2024-04-09 | End: 2025-04-09

## 2024-04-09 NOTE — PROGRESS NOTES
Pharmacist Clinic: Diabetes Management  Shola Caraballo is a 58 y.o. male was referred to Clinical Pharmacy Team for diabetes management.     Referring Provider: Christopher D'Amico, DO     HISTORY OF PRESENT ILLNESS  Approximate Date of Diagnosis: 5 + years   Known complications due to diabetes included chronic kidney disease, obesity     Diet:   - 2-3 meals per day  - Breakfast: skips  - Lunch/Dinner: chicken salad, hamburger, etc.   - Drinks: water, zero gatorde    LAB REVIEW   Glucose (mg/dL)   Date Value   03/11/2024 106 (H)   12/11/2023 69 (L)   09/14/2023 99   06/16/2023 81   06/14/2023 78     Hemoglobin A1C (%)   Date Value   03/11/2024 6.7 (H)   12/11/2023 7.6 (H)   09/14/2023 6.9 (A)   06/16/2023 7.5 (A)   01/12/2023 14.1 (A)     Bicarbonate (mmol/L)   Date Value   03/11/2024 29   12/11/2023 25   09/14/2023 28   06/16/2023 29   06/14/2023 30     Urea Nitrogen (mg/dL)   Date Value   03/11/2024 47 (H)   12/11/2023 53 (H)   09/14/2023 65 (H)   06/16/2023 56 (H)   06/14/2023 50 (H)     Creatinine (mg/dL)   Date Value   03/11/2024 1.70 (H)   12/11/2023 1.85 (H)   09/14/2023 1.98 (H)   06/16/2023 2.12 (H)   06/14/2023 1.59 (H)     Lab Results   Component Value Date    HGBA1C 6.7 (H) 03/11/2024    HGBA1C 7.6 (H) 12/11/2023    HGBA1C 6.9 (A) 09/14/2023     Lab Results   Component Value Date    CHOL 172 03/11/2024    CHOL 151 12/11/2023    CHOL 173 09/14/2023     Lab Results   Component Value Date    HDL 35.6 03/11/2024    HDL 30.9 12/11/2023    HDL 30.5 (A) 09/14/2023     Lab Results   Component Value Date    LDLCALC 111 (H) 03/11/2024    LDLCALC 96 12/11/2023     Lab Results   Component Value Date    TRIG 129 03/11/2024    TRIG 120 12/11/2023    TRIG 177 (H) 09/14/2023       DIABETES ASSESSMENT    CURRENT PHARMACOTHERAPY  - mounjaro 5 mg under the skin once weekly   - farxiga 10 mg once daily     SECONDARY PREVENTION  - Statin? No  - ACE-I/ARB? No    HISTORICAL PHARMACOTHERAPY  - metformin: discontinued due to  kidney function   - tresiba 20 units once daily: non-adherent (2 injections per week)  - aspart 20 units (patient thinks): non-adherent (maybe 1 injection per week)     SMBG MEASUREMENTS:  range    DISCUSSION:   Diabetes:   A1c came back reflecting good control of your diabetes   Mounjaro: patient is tolerating the medication, he reports diarrhea and minimal appetite  He has not yet increased due to backorder issues, he reports he has 2 pens remaining   Hyperlipidemia:   Patient is unwilling to consider a cholesterol medication at this time   Counseled on the benefits of starting a med, counseled on the risks of high cholesterol     Employee Health Diabetes Program (VBID)  - Patient enrolled in  Employee diabetes program for $0 co-pays on diabetes medications/supplies. Enrollment should be active in 2-4 weeks  - Requested VB[employer] enrollment date: 2/19/24    RECOMMENDATIONS/PLAN  1. Patients diabetes is well controlled with most recent A1c of 6.7 % (goal < 7 %).   - Continue all meds under the continuation of care with the referring provider and clinical pharmacy team.  - Increase mounjaro to 7.5 mg once weekly.     2. Future considerations:  - reconsider rosuvastatin     Clinical Pharmacist follow up: 1 week to discuss availability of mounjaro 7.5 mg     Thank you,  Theresa Davis, PharmD    Verbal consent to manage patient's drug therapy was obtained from the patient. They were informed they may decline to participate or withdraw from participation in pharmacy services at any time.

## 2024-04-10 ENCOUNTER — PHARMACY VISIT (OUTPATIENT)
Dept: PHARMACY | Facility: CLINIC | Age: 59
End: 2024-04-10
Payer: COMMERCIAL

## 2024-04-16 ENCOUNTER — TELEMEDICINE (OUTPATIENT)
Dept: PHARMACY | Facility: HOSPITAL | Age: 59
End: 2024-04-16
Payer: COMMERCIAL

## 2024-04-16 DIAGNOSIS — E11.9 DIABETES MELLITUS TYPE 2 WITHOUT RETINOPATHY (MULTI): Primary | ICD-10-CM

## 2024-04-16 NOTE — PROGRESS NOTES
Pharmacist Clinic: Diabetes Management  Shola Caraballo is a 58 y.o. male was referred to Clinical Pharmacy Team for diabetes management.     Referring Provider: Christopher D'Amico, DO     HISTORY OF PRESENT ILLNESS  Approximate Date of Diagnosis: 5 + years   Known complications due to diabetes included chronic kidney disease, obesity     Diet:   - 2-3 meals per day  - Breakfast: skips  - Lunch/Dinner: chicken salad, hamburger, etc.   - Drinks: water, zero gatorde    LAB REVIEW   Glucose (mg/dL)   Date Value   03/11/2024 106 (H)   12/11/2023 69 (L)   09/14/2023 99   06/16/2023 81   06/14/2023 78     Hemoglobin A1C (%)   Date Value   03/11/2024 6.7 (H)   12/11/2023 7.6 (H)   09/14/2023 6.9 (A)   06/16/2023 7.5 (A)   01/12/2023 14.1 (A)     Bicarbonate (mmol/L)   Date Value   03/11/2024 29   12/11/2023 25   09/14/2023 28   06/16/2023 29   06/14/2023 30     Urea Nitrogen (mg/dL)   Date Value   03/11/2024 47 (H)   12/11/2023 53 (H)   09/14/2023 65 (H)   06/16/2023 56 (H)   06/14/2023 50 (H)     Creatinine (mg/dL)   Date Value   03/11/2024 1.70 (H)   12/11/2023 1.85 (H)   09/14/2023 1.98 (H)   06/16/2023 2.12 (H)   06/14/2023 1.59 (H)     Lab Results   Component Value Date    HGBA1C 6.7 (H) 03/11/2024    HGBA1C 7.6 (H) 12/11/2023    HGBA1C 6.9 (A) 09/14/2023     Lab Results   Component Value Date    CHOL 172 03/11/2024    CHOL 151 12/11/2023    CHOL 173 09/14/2023     Lab Results   Component Value Date    HDL 35.6 03/11/2024    HDL 30.9 12/11/2023    HDL 30.5 (A) 09/14/2023     Lab Results   Component Value Date    LDLCALC 111 (H) 03/11/2024    LDLCALC 96 12/11/2023     Lab Results   Component Value Date    TRIG 129 03/11/2024    TRIG 120 12/11/2023    TRIG 177 (H) 09/14/2023       DIABETES ASSESSMENT    CURRENT PHARMACOTHERAPY  - mounjaro 5 mg under the skin once weekly (Sundays)  - farxiga 10 mg once daily     SECONDARY PREVENTION  - Statin? No  - ACE-I/ARB? No    HISTORICAL PHARMACOTHERAPY  - metformin:  discontinued due to kidney function   - tresiba 20 units once daily: non-adherent (2 injections per week)  - aspart 20 units (patient thinks): non-adherent (maybe 1 injection per week)     SMBG MEASUREMENTS:  range    DISCUSSION:   Diabetes:   A1c came back reflecting good control of your diabetes   Mounjaro: patient is tolerating the medication, he has one pen of 5 mg left, then counseled to increase to 7.5 mg once weekly for cardiometabolic benefits   Hyperlipidemia:   Patient is unwilling to consider a cholesterol medication at this time   Counseled on the benefits of starting a med, counseled on the risks of high cholesterol     Employee Health Diabetes Program (VBID)  - Patient enrolled in  Employee diabetes program for $0 co-pays on diabetes medications/supplies. Enrollment should be active in 2-4 weeks  - Requested VB[employer] enrollment date: 2/19/24    RECOMMENDATIONS/PLAN  1. Patients diabetes is well controlled with most recent A1c of 6.7 % (goal < 7 %).   - Continue all meds under the continuation of care with the referring provider and clinical pharmacy team.  - Increase mounjaro to 7.5 mg once weekly.     2. Future considerations:  - reconsider rosuvastatin     Clinical Pharmacist follow up: 1 month    Thank you,  Theresa Davis, PharmD    Verbal consent to manage patient's drug therapy was obtained from the patient. They were informed they may decline to participate or withdraw from participation in pharmacy services at any time.

## 2024-04-26 PROCEDURE — RXMED WILLOW AMBULATORY MEDICATION CHARGE

## 2024-05-06 PROCEDURE — RXMED WILLOW AMBULATORY MEDICATION CHARGE

## 2024-05-07 ENCOUNTER — PHARMACY VISIT (OUTPATIENT)
Dept: PHARMACY | Facility: CLINIC | Age: 59
End: 2024-05-07
Payer: COMMERCIAL

## 2024-05-08 PROCEDURE — RXMED WILLOW AMBULATORY MEDICATION CHARGE

## 2024-05-10 ENCOUNTER — PHARMACY VISIT (OUTPATIENT)
Dept: PHARMACY | Facility: CLINIC | Age: 59
End: 2024-05-10
Payer: COMMERCIAL

## 2024-05-16 ENCOUNTER — TELEMEDICINE (OUTPATIENT)
Dept: PHARMACY | Facility: HOSPITAL | Age: 59
End: 2024-05-16
Payer: COMMERCIAL

## 2024-05-16 DIAGNOSIS — I50.30 HEART FAILURE WITH PRESERVED EJECTION FRACTION, UNSPECIFIED HF CHRONICITY (MULTI): Primary | ICD-10-CM

## 2024-05-16 DIAGNOSIS — E11.9 DIABETES MELLITUS TYPE 2 WITHOUT RETINOPATHY (MULTI): ICD-10-CM

## 2024-05-16 PROCEDURE — RXMED WILLOW AMBULATORY MEDICATION CHARGE

## 2024-05-16 RX ORDER — DAPAGLIFLOZIN 10 MG/1
10 TABLET, FILM COATED ORAL
Qty: 90 TABLET | Refills: 1 | Status: SHIPPED | OUTPATIENT
Start: 2024-05-16

## 2024-05-16 RX ORDER — TIRZEPATIDE 7.5 MG/.5ML
7.5 INJECTION, SOLUTION SUBCUTANEOUS
Qty: 6 ML | Refills: 1 | Status: SHIPPED | OUTPATIENT
Start: 2024-05-19

## 2024-05-16 NOTE — PROGRESS NOTES
Pharmacist Clinic: Diabetes Management  Shola Caraballo is a 58 y.o. male was referred to Clinical Pharmacy Team for diabetes management.     Referring Provider: Christopher D'Amico, DO     HISTORY OF PRESENT ILLNESS  Approximate Date of Diagnosis: 5 + years   Known complications due to diabetes included chronic kidney disease, obesity     Diet: historically had 2-3 meals per day and he did not limit what he ate  - since starting mounjaro patients appetite has decreased significantly   - starts his day with a protein shake and focuses on eating fruits and veggies throughout the day      LAB REVIEW   Glucose (mg/dL)   Date Value   03/11/2024 106 (H)   12/11/2023 69 (L)   09/14/2023 99   06/16/2023 81   06/14/2023 78     Hemoglobin A1C (%)   Date Value   03/11/2024 6.7 (H)   12/11/2023 7.6 (H)   09/14/2023 6.9 (A)   06/16/2023 7.5 (A)   01/12/2023 14.1 (A)     Bicarbonate (mmol/L)   Date Value   03/11/2024 29   12/11/2023 25   09/14/2023 28   06/16/2023 29   06/14/2023 30     Urea Nitrogen (mg/dL)   Date Value   03/11/2024 47 (H)   12/11/2023 53 (H)   09/14/2023 65 (H)   06/16/2023 56 (H)   06/14/2023 50 (H)     Creatinine (mg/dL)   Date Value   03/11/2024 1.70 (H)   12/11/2023 1.85 (H)   09/14/2023 1.98 (H)   06/16/2023 2.12 (H)   06/14/2023 1.59 (H)     Lab Results   Component Value Date    HGBA1C 6.7 (H) 03/11/2024    HGBA1C 7.6 (H) 12/11/2023    HGBA1C 6.9 (A) 09/14/2023     Lab Results   Component Value Date    CHOL 172 03/11/2024    CHOL 151 12/11/2023    CHOL 173 09/14/2023     Lab Results   Component Value Date    HDL 35.6 03/11/2024    HDL 30.9 12/11/2023    HDL 30.5 (A) 09/14/2023     Lab Results   Component Value Date    LDLCALC 111 (H) 03/11/2024    LDLCALC 96 12/11/2023     Lab Results   Component Value Date    TRIG 129 03/11/2024    TRIG 120 12/11/2023    TRIG 177 (H) 09/14/2023       DIABETES ASSESSMENT    CURRENT PHARMACOTHERAPY  - mounjaro 7.5 mg under the skin once weekly (Sundays)  - farxiga 10 mg  once daily     SECONDARY PREVENTION  - Statin? No  - ACE-I/ARB? No    HISTORICAL PHARMACOTHERAPY  - metformin: discontinued due to kidney function   - tresiba 20 units once daily: non-adherent (2 injections per week)  - aspart 20 units (patient thinks): non-adherent (maybe 1 injection per week)   - trulicity: transitioned to mounjaro for metabolic benefits     SMBG MEASUREMENTS:  range    DISCUSSION:   Diabetes:   A1c came back reflecting good control of your diabetes   Mounjaro: patient is tolerating the medication  He reports since increasing the dose, he has had some mild GI upset but nothing intolerable  He reports he notices his clothes are getting bigger   Hyperlipidemia:   Patient is unwilling to consider a cholesterol medication at this time   Counseled on the benefits of starting a med, counseled on the risks of high cholesterol     Employee Health Diabetes Program (VBID)  - Patient enrolled in  Employee diabetes program for $0 co-pays on diabetes medications/supplies. Enrollment should be active in 2-4 weeks  - Requested VB[employer] enrollment date: 2/19/24    RECOMMENDATIONS/PLAN  1. Patients diabetes is well controlled with most recent A1c of 6.7 % (goal < 7 %).   - Continue all meds under the continuation of care with the referring provider and clinical pharmacy team.    2. Future considerations:  - reconsider rosuvastatin     Clinical Pharmacist follow up: after next PCP appt     Thank you,  Theresa Davis, PharmD    Verbal consent to manage patient's drug therapy was obtained from the patient. They were informed they may decline to participate or withdraw from participation in pharmacy services at any time.

## 2024-05-17 ENCOUNTER — PHARMACY VISIT (OUTPATIENT)
Dept: PHARMACY | Facility: CLINIC | Age: 59
End: 2024-05-17
Payer: COMMERCIAL

## 2024-05-23 ENCOUNTER — PHARMACY VISIT (OUTPATIENT)
Dept: PHARMACY | Facility: CLINIC | Age: 59
End: 2024-05-23

## 2024-05-27 DIAGNOSIS — E11.9 TYPE 2 DIABETES MELLITUS WITHOUT COMPLICATION, WITH LONG-TERM CURRENT USE OF INSULIN (MULTI): ICD-10-CM

## 2024-05-27 DIAGNOSIS — Z79.4 TYPE 2 DIABETES MELLITUS WITHOUT COMPLICATION, WITH LONG-TERM CURRENT USE OF INSULIN (MULTI): ICD-10-CM

## 2024-05-27 RX ORDER — GLYBURIDE 5 MG/1
TABLET ORAL
Qty: 360 TABLET | Refills: 0 | OUTPATIENT
Start: 2024-05-27 | End: 2025-05-26

## 2024-06-03 PROCEDURE — RXMED WILLOW AMBULATORY MEDICATION CHARGE

## 2024-06-04 ENCOUNTER — PHARMACY VISIT (OUTPATIENT)
Dept: PHARMACY | Facility: CLINIC | Age: 59
End: 2024-06-04
Payer: COMMERCIAL

## 2024-06-10 PROCEDURE — RXMED WILLOW AMBULATORY MEDICATION CHARGE

## 2024-06-11 ENCOUNTER — PHARMACY VISIT (OUTPATIENT)
Dept: PHARMACY | Facility: CLINIC | Age: 59
End: 2024-06-11
Payer: COMMERCIAL

## 2024-07-12 PROCEDURE — RXMED WILLOW AMBULATORY MEDICATION CHARGE

## 2024-07-16 ENCOUNTER — PHARMACY VISIT (OUTPATIENT)
Dept: PHARMACY | Facility: CLINIC | Age: 59
End: 2024-07-16
Payer: COMMERCIAL

## 2024-07-23 ENCOUNTER — TELEMEDICINE (OUTPATIENT)
Dept: PHARMACY | Facility: HOSPITAL | Age: 59
End: 2024-07-23
Payer: COMMERCIAL

## 2024-07-23 DIAGNOSIS — E11.9 DIABETES MELLITUS TYPE 2 WITHOUT RETINOPATHY (MULTI): Primary | ICD-10-CM

## 2024-07-23 DIAGNOSIS — I50.30 HEART FAILURE WITH PRESERVED EJECTION FRACTION, UNSPECIFIED HF CHRONICITY (MULTI): ICD-10-CM

## 2024-07-23 RX ORDER — TIRZEPATIDE 5 MG/.5ML
5 INJECTION, SOLUTION SUBCUTANEOUS
Qty: 2 ML | Refills: 2 | Status: SHIPPED | OUTPATIENT
Start: 2024-07-28

## 2024-07-23 NOTE — PROGRESS NOTES
Pharmacist Clinic: Diabetes Management  Shola Caraballo is a 58 y.o. male was referred to Clinical Pharmacy Team for diabetes management.     Referring Provider: Christopher D'Amico, DO     HISTORY OF PRESENT ILLNESS  Approximate Date of Diagnosis: 5 + years   Known complications due to diabetes included chronic kidney disease, obesity     Diet: historically had 2-3 meals per day and he did not limit what he ate  - since starting mounjaro patients appetite has decreased significantly   - starts his day with a protein shake and focuses on eating fruits and veggies throughout the day      LAB REVIEW   Glucose (mg/dL)   Date Value   03/11/2024 106 (H)   12/11/2023 69 (L)   09/14/2023 99   06/16/2023 81   06/14/2023 78     Hemoglobin A1C (%)   Date Value   03/11/2024 6.7 (H)   12/11/2023 7.6 (H)   09/14/2023 6.9 (A)   06/16/2023 7.5 (A)   01/12/2023 14.1 (A)     Bicarbonate (mmol/L)   Date Value   03/11/2024 29   12/11/2023 25   09/14/2023 28   06/16/2023 29   06/14/2023 30     Urea Nitrogen (mg/dL)   Date Value   03/11/2024 47 (H)   12/11/2023 53 (H)   09/14/2023 65 (H)   06/16/2023 56 (H)   06/14/2023 50 (H)     Creatinine (mg/dL)   Date Value   03/11/2024 1.70 (H)   12/11/2023 1.85 (H)   09/14/2023 1.98 (H)   06/16/2023 2.12 (H)   06/14/2023 1.59 (H)     Lab Results   Component Value Date    HGBA1C 6.7 (H) 03/11/2024    HGBA1C 7.6 (H) 12/11/2023    HGBA1C 6.9 (A) 09/14/2023     Lab Results   Component Value Date    CHOL 172 03/11/2024    CHOL 151 12/11/2023    CHOL 173 09/14/2023     Lab Results   Component Value Date    HDL 35.6 03/11/2024    HDL 30.9 12/11/2023    HDL 30.5 (A) 09/14/2023     Lab Results   Component Value Date    LDLCALC 111 (H) 03/11/2024    LDLCALC 96 12/11/2023     Lab Results   Component Value Date    TRIG 129 03/11/2024    TRIG 120 12/11/2023    TRIG 177 (H) 09/14/2023       DIABETES ASSESSMENT    CURRENT PHARMACOTHERAPY  - mounjaro 7.5 mg under the skin once weekly (Sundays)  - farxiga 10 mg  once daily     SECONDARY PREVENTION  - Statin? No  - ACE-I/ARB? No    HISTORICAL PHARMACOTHERAPY  - metformin: discontinued due to kidney function   - tresiba 20 units once daily: non-adherent (2 injections per week)  - aspart 20 units (patient thinks): non-adherent (maybe 1 injection per week)   - trulicity: transitioned to mounjaro for metabolic benefits     SMBG MEASUREMENTS:  range    DISCUSSION:   Diabetes:   A1c came back reflecting good control of your diabetes   Mounjaro: patient reports diarrhea on the medication  Discussed this is a side effect, discussed it can be exacerbated by certain foods   He reports having a burger, cabbage and greens, discussed these can all be hard to digest and cause some distress   He reports this has happened a few times before, he can't remember if he ate anything hard to digest these past few times   Discussed decreasing to 5 mg once weekly   Counseled patient, answered all questions and concerns   Hyperlipidemia:   Patient is unwilling to consider a cholesterol medication at this time   Counseled on the benefits of starting a med, counseled on the risks of high cholesterol     Employee Health Diabetes Program (VBID)  - Patient enrolled in  Employee diabetes program for $0 co-pays on diabetes medications/supplies. Enrollment should be active in 2-4 weeks  - Requested VBID enrollment date: 2/19/24    RECOMMENDATIONS/PLAN  1. Patients diabetes is well controlled with most recent A1c of 6.7 % (goal < 7 %).   - Continue all meds under the continuation of care with the referring provider and clinical pharmacy team.  - Decrease mounjaro to 5 mg once weekly due to GI upset.     2. Future considerations:  - reconsider rosuvastatin     Clinical Pharmacist follow up: 1 month     Thank you,  Theresa Davis, PharmD    Verbal consent to manage patient's drug therapy was obtained from the patient. They were informed they may decline to participate or withdraw from participation  in pharmacy services at any time.

## 2024-07-30 DIAGNOSIS — N18.30 STAGE 3 CHRONIC KIDNEY DISEASE, UNSPECIFIED WHETHER STAGE 3A OR 3B CKD (MULTI): Primary | ICD-10-CM

## 2024-07-31 ENCOUNTER — APPOINTMENT (OUTPATIENT)
Dept: NEPHROLOGY | Facility: CLINIC | Age: 59
End: 2024-07-31
Payer: COMMERCIAL

## 2024-07-31 VITALS
DIASTOLIC BLOOD PRESSURE: 56 MMHG | BODY MASS INDEX: 42.66 KG/M2 | SYSTOLIC BLOOD PRESSURE: 90 MMHG | WEIGHT: 315 LBS | HEIGHT: 72 IN | HEART RATE: 91 BPM

## 2024-07-31 DIAGNOSIS — N18.30 STAGE 3 CHRONIC KIDNEY DISEASE, UNSPECIFIED WHETHER STAGE 3A OR 3B CKD (MULTI): Primary | ICD-10-CM

## 2024-07-31 PROCEDURE — 3049F LDL-C 100-129 MG/DL: CPT | Performed by: INTERNAL MEDICINE

## 2024-07-31 PROCEDURE — 99214 OFFICE O/P EST MOD 30 MIN: CPT | Performed by: INTERNAL MEDICINE

## 2024-07-31 PROCEDURE — 3008F BODY MASS INDEX DOCD: CPT | Performed by: INTERNAL MEDICINE

## 2024-07-31 PROCEDURE — 3074F SYST BP LT 130 MM HG: CPT | Performed by: INTERNAL MEDICINE

## 2024-07-31 PROCEDURE — 3044F HG A1C LEVEL LT 7.0%: CPT | Performed by: INTERNAL MEDICINE

## 2024-07-31 PROCEDURE — RXMED WILLOW AMBULATORY MEDICATION CHARGE

## 2024-07-31 PROCEDURE — 4010F ACE/ARB THERAPY RXD/TAKEN: CPT | Performed by: INTERNAL MEDICINE

## 2024-07-31 PROCEDURE — 1036F TOBACCO NON-USER: CPT | Performed by: INTERNAL MEDICINE

## 2024-07-31 PROCEDURE — 3078F DIAST BP <80 MM HG: CPT | Performed by: INTERNAL MEDICINE

## 2024-07-31 PROCEDURE — 3061F NEG MICROALBUMINURIA REV: CPT | Performed by: INTERNAL MEDICINE

## 2024-07-31 RX ORDER — HYDRALAZINE HYDROCHLORIDE 25 MG/1
25 TABLET, FILM COATED ORAL 2 TIMES DAILY
Qty: 60 TABLET | Refills: 1 | Status: SHIPPED | OUTPATIENT
Start: 2024-07-31 | End: 2024-09-29

## 2024-07-31 NOTE — PROGRESS NOTES
Shola Caraballo is a 58 y.o. male here in follow up for CKD    Subjective   Doing well. Working and asking for an work excuse today, which was granted. Follows closely with Dr. D' Amico and was started on Mounjaro in November. Checks home BP an his readings are 119-126/60-70s.  Takes all his meds in am except for glyburide, so we discussed changing the hydralazine (taking 100 mg in am) to 25 mg twice a day, a lower dose. Denies increase in leg swelling, dysuria, hematuria or lightheadedness upon standing.  Compliant with his CPAP.     ROS  All the rest reviewed and negative    Objective     Vital signs    BP 90/56 (BP Location: Right arm, Patient Position: Sitting)   Pulse 91   Ht 1.829 m (6')   Wt 147 kg (324 lb)   BMI 43.94 kg/m²     Physical Exam  Constitutional:  well appearing, in NAD  Psychiatric:  appropriate mood and behavior    HEENT: NC/AT, clear sclerae, moist mucous membranes  Cardiovascular: normal S1, S2, RRR,  no murmurs, gallop or rubs  Pulmonary:  Normal breath sounds  Extremities: trace pretibial edema  Skin:  warm and dry    Meds    Current Outpatient Medications:     Accu-Chek Guide Glucose Meter misc, , Disp: , Rfl:     alcohol swabs pads, medicated, USE AS DIRECTED FOUR TIMES A DAY WHEN CHECKING BLOOD GLUCOSE, Disp: 400 each, Rfl: 3    allopurinol (Zyloprim) 300 mg tablet, Take 1 tablet (300 mg) by mouth once daily., Disp: 90 tablet, Rfl: 3    amLODIPine (Norvasc) 10 mg tablet, Take 1 tablet (10 mg) by mouth once daily., Disp: 90 tablet, Rfl: 3    ascorbic acid (Vitamin C) 1,000 mg tablet, Take 0.5 tablets (500 mg) by mouth once daily., Disp: , Rfl:     bumetanide (Bumex) 2 mg tablet, Take 1 tablet (2 mg) by mouth once daily., Disp: 90 tablet, Rfl: 3    chlorthalidone (Hygroton) 25 mg tablet, Take 1 tablet by mouth daily, Disp: 90 tablet, Rfl: 3    cholecalciferol (Vitamin D-3) 50 mcg (2,000 unit) capsule, Take 1 capsule (50 mcg) by mouth once daily., Disp: 90 capsule, Rfl: 3     colchicine 0.6 mg tablet, Take 1 tablet (0.6 mg) by mouth once daily as needed for gout flare., Disp: 10 tablet, Rfl: 1    dapagliflozin propanediol (Farxiga) 10 mg, TAKE 1 TABLET BY MOUTH EVERY MORNING, Disp: 90 tablet, Rfl: 1    ferrous sulfate 325 (65 Fe) MG tablet, 3 tablet DAILY (route: oral), Disp: , Rfl:     glyBURIDE (Diabeta) 5 mg tablet, TAKE 2 TABLETS BY MOUTH TWO TIMES A DAY BEFORE BREAKFAST AND SUPPER. NEED TO MAKE APPOINTMENT WITH DOCTOR., Disp: 360 tablet, Rfl: 0    isosorbide dinitrate (Isordil) 10 mg tablet, Take 1 tablet (10 mg) by mouth once daily., Disp: 90 tablet, Rfl: 3    lancets misc, USE AS DIRECTED TO CHECK BLOOD GLUCOSE FOUR TIMES A DAY, Disp: 400 each, Rfl: 3    lisinopril 40 mg tablet, TAKE 1 TABLET BY MOUTH ONCE DAILY, Disp: 90 tablet, Rfl: 3    metoprolol succinate XL (Toprol-XL) 100 mg 24 hr tablet, TAKE 1 TABLET BY MOUTH ONCE DAILY, Disp: 90 tablet, Rfl: 3    multivitamin with minerals (DAILY VITAMIN FORMULA-MINERALS) tablet, 1 tablet DAILY (route: oral), Disp: , Rfl:     pantoprazole (ProtoNix) 40 mg EC tablet, Take 1 tablet (40 mg) by mouth once daily., Disp: 90 tablet, Rfl: 3    tirzepatide (Mounjaro) 5 mg/0.5 mL pen injector, Inject 5 mg under the skin 1 (one) time per week., Disp: 2 mL, Rfl: 2    blood sugar diagnostic strip, USE 1 STRIP AS DIRECTED TO CHECK BLOOD GLUCOSE FOUR TIMES A DAY, Disp: 400 each, Rfl: 3    hydrALAZINE (Apresoline) 25 mg tablet, Take 1 tablet (25 mg) by mouth 2 times a day., Disp: 60 tablet, Rfl: 1     Allergies  No Known Allergies     Results   Latest Reference Range & Units 06/14/23 15:48 06/16/23 09:44 09/14/23 10:01 12/11/23 11:53 03/11/24 10:50   Creatinine 0.50 - 1.30 mg/dL 1.59 (H) 2.12 (H) 1.98 (H) 1.85 (H) 1.70 (H)       Component      Latest Ref Rng 3/11/2024   Albumin/Creatinine Ratio      <30.0 ug/mg Creat 20.8       Latest Reference Range & Units 03/11/24 10:50   Parathyroid Hormone, Intact 18.5 - 88.0 pg/mL 141.0 (H)      Latest Reference  Range & Units 12/11/23 11:53   Vitamin D, 25-Hydroxy, Total 30 - 100 ng/mL 32     Imaging results  CT scan abdomen 6/2023:    ADRENAL GLANDS:   Bilateral adrenal glands appear normal.    KIDNEYS AND URETERS:   The kidneys are normal in size and unremarkable in appearance.  No   hydroureteronephrosis or nephroureterolithiasis is identified.  BLADDER:   The urinary bladder appears normal without abnormal wall thickening.     Assessment and Plan  Impression:  1. CKD stage G3b/A1, likely diabetic and /or hypertensive kidney disease. Sickle cell trait positive.  Secondary FSGS and secondary amyloidosis possible but unlikely. On maximal dose of ACEi . Last s.creatinine 1.7 mg/dl in March (above) stable. No albuminuria per spot studies 12/23. Needs repeat.  2. HTN -low today. Asymptomatic. Decreased hydralazine dose as above.  3. Hyperparathyroidism of CKD and hypovitaminosis D (last measurements above) - on vitamin D supplements. Continue same.     Plan/recommendations:  - continue antihypertensives as are except change Hydralazine to 25 mg BID  - home BP check am and pm for 3-5 days and send readings to me  - repeat labs per orders, at your convenience  - follow up with PCP, endocrinology and cardiology as scheduled     RTC in follow up in 4-6 months.        Jaida Baez MD    8/12/2024:  Patient sent home BP readings which are as follows:  Aug 7  am 132/80  pm 134/76  Aug 8  am 136/77 pm 126/71  Aug 9 am 121/75 pm 121/64  Aug 10 am 101/63 pm 124/72   Aug 11 am 129/76/71 pm 125/68    Will continue medications as instructed in the above plan.

## 2024-07-31 NOTE — LETTER
July 31, 2024     Patient: Shola Caraballo   YOB: 1965   Date of Visit: 7/31/2024       To Whom It May Concern:    Shola Caraballo was seen in my clinic on 7/31/2024 at 8:40 am. Please excuse Shola for his absence from work on this day to make the appointment.    If you have any questions or concerns, please don't hesitate to call.         Sincerely,         Jaida Baez MD        CC: No Recipients

## 2024-07-31 NOTE — PATIENT INSTRUCTIONS
Decreasing hydralazine to 25 mg BID  Please check home BP morning and evening for 3-5 days and email me the BP readings.  Blood work at your convenience.  See you back in 4-6 months.

## 2024-08-01 ENCOUNTER — PHARMACY VISIT (OUTPATIENT)
Dept: PHARMACY | Facility: CLINIC | Age: 59
End: 2024-08-01
Payer: COMMERCIAL

## 2024-08-02 ENCOUNTER — PHARMACY VISIT (OUTPATIENT)
Dept: PHARMACY | Facility: CLINIC | Age: 59
End: 2024-08-02

## 2024-08-12 ENCOUNTER — LAB (OUTPATIENT)
Dept: LAB | Facility: LAB | Age: 59
End: 2024-08-12
Payer: COMMERCIAL

## 2024-08-12 DIAGNOSIS — N18.30 STAGE 3 CHRONIC KIDNEY DISEASE, UNSPECIFIED WHETHER STAGE 3A OR 3B CKD (MULTI): ICD-10-CM

## 2024-08-12 DIAGNOSIS — N25.81 SECONDARY HYPERPARATHYROIDISM (MULTI): ICD-10-CM

## 2024-08-12 LAB
ALBUMIN SERPL BCP-MCNC: 4.4 G/DL (ref 3.4–5)
ANION GAP SERPL CALC-SCNC: 15 MMOL/L (ref 10–20)
BUN SERPL-MCNC: 46 MG/DL (ref 6–23)
CALCIUM SERPL-MCNC: 9.5 MG/DL (ref 8.6–10.6)
CHLORIDE SERPL-SCNC: 100 MMOL/L (ref 98–107)
CO2 SERPL-SCNC: 29 MMOL/L (ref 21–32)
CREAT SERPL-MCNC: 1.86 MG/DL (ref 0.5–1.3)
EGFRCR SERPLBLD CKD-EPI 2021: 42 ML/MIN/1.73M*2
ERYTHROCYTE [DISTWIDTH] IN BLOOD BY AUTOMATED COUNT: 19.8 % (ref 11.5–14.5)
GLUCOSE SERPL-MCNC: 73 MG/DL (ref 74–99)
HCT VFR BLD AUTO: 37.1 % (ref 41–52)
HGB BLD-MCNC: 11.4 G/DL (ref 13.5–17.5)
MCH RBC QN AUTO: 22.8 PG (ref 26–34)
MCHC RBC AUTO-ENTMCNC: 30.7 G/DL (ref 32–36)
MCV RBC AUTO: 74 FL (ref 80–100)
NRBC BLD-RTO: 0 /100 WBCS (ref 0–0)
PHOSPHATE SERPL-MCNC: 3.2 MG/DL (ref 2.5–4.9)
PLATELET # BLD AUTO: 266 X10*3/UL (ref 150–450)
POTASSIUM SERPL-SCNC: 3.9 MMOL/L (ref 3.5–5.3)
PTH-INTACT SERPL-MCNC: 183.4 PG/ML (ref 18.5–88)
RBC # BLD AUTO: 4.99 X10*6/UL (ref 4.5–5.9)
SODIUM SERPL-SCNC: 140 MMOL/L (ref 136–145)
WBC # BLD AUTO: 7.8 X10*3/UL (ref 4.4–11.3)

## 2024-08-12 PROCEDURE — 80069 RENAL FUNCTION PANEL: CPT

## 2024-08-12 PROCEDURE — 82570 ASSAY OF URINE CREATININE: CPT

## 2024-08-12 PROCEDURE — 83970 ASSAY OF PARATHORMONE: CPT

## 2024-08-12 PROCEDURE — 81003 URINALYSIS AUTO W/O SCOPE: CPT

## 2024-08-12 PROCEDURE — 82306 VITAMIN D 25 HYDROXY: CPT

## 2024-08-12 PROCEDURE — 36415 COLL VENOUS BLD VENIPUNCTURE: CPT

## 2024-08-12 PROCEDURE — 85027 COMPLETE CBC AUTOMATED: CPT

## 2024-08-12 PROCEDURE — 82043 UR ALBUMIN QUANTITATIVE: CPT

## 2024-08-13 LAB
25(OH)D3 SERPL-MCNC: 42 NG/ML (ref 30–100)
APPEARANCE UR: CLEAR
BILIRUB UR STRIP.AUTO-MCNC: NEGATIVE MG/DL
COLOR UR: ABNORMAL
CREAT UR-MCNC: 84.6 MG/DL (ref 20–370)
GLUCOSE UR STRIP.AUTO-MCNC: ABNORMAL MG/DL
KETONES UR STRIP.AUTO-MCNC: NEGATIVE MG/DL
LEUKOCYTE ESTERASE UR QL STRIP.AUTO: NEGATIVE
MICROALBUMIN UR-MCNC: <7 MG/L
MICROALBUMIN/CREAT UR: NORMAL MG/G{CREAT}
NITRITE UR QL STRIP.AUTO: NEGATIVE
PH UR STRIP.AUTO: 6 [PH]
PROT UR STRIP.AUTO-MCNC: NEGATIVE MG/DL
RBC # UR STRIP.AUTO: NEGATIVE /UL
SP GR UR STRIP.AUTO: 1.01
UROBILINOGEN UR STRIP.AUTO-MCNC: NORMAL MG/DL

## 2024-08-14 DIAGNOSIS — I15.8 OTHER SECONDARY HYPERTENSION: Primary | ICD-10-CM

## 2024-08-14 PROCEDURE — RXMED WILLOW AMBULATORY MEDICATION CHARGE

## 2024-08-14 RX ORDER — CALCITRIOL 0.25 UG/1
0.25 CAPSULE ORAL 3 TIMES WEEKLY
Qty: 36 CAPSULE | Refills: 3 | Status: SHIPPED | OUTPATIENT
Start: 2024-08-14 | End: 2025-08-14

## 2024-08-15 ENCOUNTER — PHARMACY VISIT (OUTPATIENT)
Dept: PHARMACY | Facility: CLINIC | Age: 59
End: 2024-08-15
Payer: COMMERCIAL

## 2024-08-20 ENCOUNTER — APPOINTMENT (OUTPATIENT)
Dept: PHARMACY | Facility: HOSPITAL | Age: 59
End: 2024-08-20
Payer: COMMERCIAL

## 2024-08-20 DIAGNOSIS — M10.9 GOUT, UNSPECIFIED CAUSE, UNSPECIFIED CHRONICITY, UNSPECIFIED SITE: ICD-10-CM

## 2024-08-20 DIAGNOSIS — E11.9 DIABETES MELLITUS TYPE 2 WITHOUT RETINOPATHY (MULTI): ICD-10-CM

## 2024-08-20 NOTE — PROGRESS NOTES
Pharmacist Clinic: Diabetes Management  Shola Caraballo is a 58 y.o. male was referred to Clinical Pharmacy Team for diabetes management.     Referring Provider: Christopher D'Amico, DO     HISTORY OF PRESENT ILLNESS  Approximate Date of Diagnosis: 5 + years   Known complications due to diabetes included chronic kidney disease, obesity     Diet: historically had 2-3 meals per day and he did not limit what he ate  - since starting mounjaro patients appetite has decreased significantly   - starts his day with a protein shake and focuses on eating fruits and veggies throughout the day      LAB REVIEW   Glucose (mg/dL)   Date Value   08/12/2024 73 (L)   03/11/2024 106 (H)   12/11/2023 69 (L)     Hemoglobin A1C (%)   Date Value   03/11/2024 6.7 (H)   12/11/2023 7.6 (H)   09/14/2023 6.9 (A)   06/16/2023 7.5 (A)   01/12/2023 14.1 (A)     Bicarbonate (mmol/L)   Date Value   08/12/2024 29   03/11/2024 29   12/11/2023 25     Urea Nitrogen (mg/dL)   Date Value   08/12/2024 46 (H)   03/11/2024 47 (H)   12/11/2023 53 (H)     Creatinine (mg/dL)   Date Value   08/12/2024 1.86 (H)   03/11/2024 1.70 (H)   12/11/2023 1.85 (H)     Lab Results   Component Value Date    HGBA1C 6.7 (H) 03/11/2024    HGBA1C 7.6 (H) 12/11/2023    HGBA1C 6.9 (A) 09/14/2023     Lab Results   Component Value Date    CHOL 172 03/11/2024    CHOL 151 12/11/2023    CHOL 173 09/14/2023     Lab Results   Component Value Date    HDL 35.6 03/11/2024    HDL 30.9 12/11/2023    HDL 30.5 (A) 09/14/2023     Lab Results   Component Value Date    LDLCALC 111 (H) 03/11/2024    LDLCALC 96 12/11/2023     Lab Results   Component Value Date    TRIG 129 03/11/2024    TRIG 120 12/11/2023    TRIG 177 (H) 09/14/2023       DIABETES ASSESSMENT    CURRENT PHARMACOTHERAPY  - mounjaro 5 mg under the skin once weekly (Sundays)  - farxiga 10 mg once daily     SECONDARY PREVENTION  - Statin? No  - ACE-I/ARB? No    HISTORICAL PHARMACOTHERAPY  - metformin: discontinued due to kidney  function   - tresiba 20 units once daily: non-adherent (2 injections per week)  - aspart 20 units (patient thinks): non-adherent (maybe 1 injection per week)   - trulicity: transitioned to mounjaro for metabolic benefits     SMBG MEASUREMENTS:  range    DISCUSSION:   Diabetes:   A1c came back reflecting good control of your diabetes   Mounjaro:   We increased the dose to 7.5 mg, however patient experienced diarrhea   He is not sure if the diarrhea was from the medication increase or from the boost drinks he has been having but it has since subsided   He is tolerating 5 mg, he reports a decrease in appetite and is maybe only having 1-2 meals per day   We will continue on 5 mg  Hyperlipidemia:   Patient is unwilling to consider a cholesterol medication at this time   Counseled on the benefits of starting a med, counseled on the risks of high cholesterol     Employee Health Diabetes Program (VBID)  - Patient enrolled in  Employee diabetes program for $0 co-pays on diabetes medications/supplies. Enrollment should be active in 2-4 weeks  - Requested VB[employer] enrollment date: 2/19/24    RECOMMENDATIONS/PLAN  1. Patients diabetes is well controlled with most recent A1c of 6.7 % (goal < 7 %).   - Continue all meds under the continuation of care with the referring provider and clinical pharmacy team.     2. Future considerations:  - reconsider rosuvastatin     Clinical Pharmacist follow up: 1 month     Thank you,  Theresa Davis, PharmD    Verbal consent to manage patient's drug therapy was obtained from the patient. They were informed they may decline to participate or withdraw from participation in pharmacy services at any time.

## 2024-08-21 PROCEDURE — RXMED WILLOW AMBULATORY MEDICATION CHARGE

## 2024-08-21 RX ORDER — COLCHICINE 0.6 MG/1
0.6 TABLET ORAL DAILY PRN
Qty: 10 TABLET | Refills: 1 | Status: SHIPPED | OUTPATIENT
Start: 2024-08-21

## 2024-08-23 ENCOUNTER — PHARMACY VISIT (OUTPATIENT)
Dept: PHARMACY | Facility: CLINIC | Age: 59
End: 2024-08-23
Payer: COMMERCIAL

## 2024-09-04 PROCEDURE — RXMED WILLOW AMBULATORY MEDICATION CHARGE

## 2024-09-07 ENCOUNTER — PHARMACY VISIT (OUTPATIENT)
Dept: PHARMACY | Facility: CLINIC | Age: 59
End: 2024-09-07
Payer: COMMERCIAL

## 2024-09-09 ENCOUNTER — PHARMACY VISIT (OUTPATIENT)
Dept: PHARMACY | Facility: CLINIC | Age: 59
End: 2024-09-09
Payer: COMMERCIAL

## 2024-09-12 ENCOUNTER — APPOINTMENT (OUTPATIENT)
Dept: PRIMARY CARE | Facility: CLINIC | Age: 59
End: 2024-09-12
Payer: COMMERCIAL

## 2024-09-12 VITALS
WEIGHT: 315 LBS | SYSTOLIC BLOOD PRESSURE: 126 MMHG | HEIGHT: 72 IN | BODY MASS INDEX: 42.66 KG/M2 | HEART RATE: 73 BPM | DIASTOLIC BLOOD PRESSURE: 76 MMHG | OXYGEN SATURATION: 96 %

## 2024-09-12 DIAGNOSIS — I50.9 CONGESTIVE HEART FAILURE, UNSPECIFIED HF CHRONICITY, UNSPECIFIED HEART FAILURE TYPE (MULTI): ICD-10-CM

## 2024-09-12 DIAGNOSIS — G47.33 OSA (OBSTRUCTIVE SLEEP APNEA): ICD-10-CM

## 2024-09-12 DIAGNOSIS — M10.9 GOUT, UNSPECIFIED CAUSE, UNSPECIFIED CHRONICITY, UNSPECIFIED SITE: ICD-10-CM

## 2024-09-12 DIAGNOSIS — I10 HTN (HYPERTENSION), BENIGN: Primary | ICD-10-CM

## 2024-09-12 DIAGNOSIS — G89.29 CHRONIC LOW BACK PAIN, UNSPECIFIED BACK PAIN LATERALITY, UNSPECIFIED WHETHER SCIATICA PRESENT: ICD-10-CM

## 2024-09-12 DIAGNOSIS — E11.9 TYPE 2 DIABETES MELLITUS WITHOUT COMPLICATION, WITH LONG-TERM CURRENT USE OF INSULIN (MULTI): ICD-10-CM

## 2024-09-12 DIAGNOSIS — I50.30 HEART FAILURE WITH PRESERVED EJECTION FRACTION, UNSPECIFIED HF CHRONICITY (MULTI): ICD-10-CM

## 2024-09-12 DIAGNOSIS — E78.5 HYPERLIPIDEMIA, UNSPECIFIED HYPERLIPIDEMIA TYPE: ICD-10-CM

## 2024-09-12 DIAGNOSIS — N18.32 STAGE 3B CHRONIC KIDNEY DISEASE (MULTI): ICD-10-CM

## 2024-09-12 DIAGNOSIS — D64.9 ANEMIA, UNSPECIFIED TYPE: ICD-10-CM

## 2024-09-12 DIAGNOSIS — Z79.4 TYPE 2 DIABETES MELLITUS WITHOUT COMPLICATION, WITH LONG-TERM CURRENT USE OF INSULIN (MULTI): ICD-10-CM

## 2024-09-12 DIAGNOSIS — M54.50 CHRONIC LOW BACK PAIN, UNSPECIFIED BACK PAIN LATERALITY, UNSPECIFIED WHETHER SCIATICA PRESENT: ICD-10-CM

## 2024-09-12 DIAGNOSIS — K21.9 GASTROESOPHAGEAL REFLUX DISEASE, UNSPECIFIED WHETHER ESOPHAGITIS PRESENT: ICD-10-CM

## 2024-09-12 DIAGNOSIS — E11.9 DIABETES MELLITUS TYPE 2 WITHOUT RETINOPATHY (MULTI): ICD-10-CM

## 2024-09-12 PROCEDURE — RXMED WILLOW AMBULATORY MEDICATION CHARGE

## 2024-09-12 PROCEDURE — 3008F BODY MASS INDEX DOCD: CPT | Performed by: STUDENT IN AN ORGANIZED HEALTH CARE EDUCATION/TRAINING PROGRAM

## 2024-09-12 PROCEDURE — 3044F HG A1C LEVEL LT 7.0%: CPT | Performed by: STUDENT IN AN ORGANIZED HEALTH CARE EDUCATION/TRAINING PROGRAM

## 2024-09-12 PROCEDURE — 4010F ACE/ARB THERAPY RXD/TAKEN: CPT | Performed by: STUDENT IN AN ORGANIZED HEALTH CARE EDUCATION/TRAINING PROGRAM

## 2024-09-12 PROCEDURE — 3074F SYST BP LT 130 MM HG: CPT | Performed by: STUDENT IN AN ORGANIZED HEALTH CARE EDUCATION/TRAINING PROGRAM

## 2024-09-12 PROCEDURE — 90656 IIV3 VACC NO PRSV 0.5 ML IM: CPT | Performed by: STUDENT IN AN ORGANIZED HEALTH CARE EDUCATION/TRAINING PROGRAM

## 2024-09-12 PROCEDURE — 3078F DIAST BP <80 MM HG: CPT | Performed by: STUDENT IN AN ORGANIZED HEALTH CARE EDUCATION/TRAINING PROGRAM

## 2024-09-12 PROCEDURE — 90471 IMMUNIZATION ADMIN: CPT | Performed by: STUDENT IN AN ORGANIZED HEALTH CARE EDUCATION/TRAINING PROGRAM

## 2024-09-12 PROCEDURE — 1036F TOBACCO NON-USER: CPT | Performed by: STUDENT IN AN ORGANIZED HEALTH CARE EDUCATION/TRAINING PROGRAM

## 2024-09-12 PROCEDURE — 3049F LDL-C 100-129 MG/DL: CPT | Performed by: STUDENT IN AN ORGANIZED HEALTH CARE EDUCATION/TRAINING PROGRAM

## 2024-09-12 PROCEDURE — 3062F POS MACROALBUMINURIA REV: CPT | Performed by: STUDENT IN AN ORGANIZED HEALTH CARE EDUCATION/TRAINING PROGRAM

## 2024-09-12 PROCEDURE — 99214 OFFICE O/P EST MOD 30 MIN: CPT | Performed by: STUDENT IN AN ORGANIZED HEALTH CARE EDUCATION/TRAINING PROGRAM

## 2024-09-12 RX ORDER — DAPAGLIFLOZIN 10 MG/1
10 TABLET, FILM COATED ORAL
Qty: 90 TABLET | Refills: 1 | Status: SHIPPED | OUTPATIENT
Start: 2024-09-12

## 2024-09-12 RX ORDER — ALLOPURINOL 300 MG/1
300 TABLET ORAL DAILY
Qty: 90 TABLET | Refills: 3 | Status: SHIPPED | OUTPATIENT
Start: 2024-09-12

## 2024-09-12 RX ORDER — TIRZEPATIDE 5 MG/.5ML
5 INJECTION, SOLUTION SUBCUTANEOUS
Qty: 2 ML | Refills: 2 | Status: CANCELLED | OUTPATIENT
Start: 2024-09-15

## 2024-09-12 ASSESSMENT — ENCOUNTER SYMPTOMS
DEPRESSION: 0
OCCASIONAL FEELINGS OF UNSTEADINESS: 0
LOSS OF SENSATION IN FEET: 0

## 2024-09-12 ASSESSMENT — COLUMBIA-SUICIDE SEVERITY RATING SCALE - C-SSRS
2. HAVE YOU ACTUALLY HAD ANY THOUGHTS OF KILLING YOURSELF?: NO
1. IN THE PAST MONTH, HAVE YOU WISHED YOU WERE DEAD OR WISHED YOU COULD GO TO SLEEP AND NOT WAKE UP?: NO
6. HAVE YOU EVER DONE ANYTHING, STARTED TO DO ANYTHING, OR PREPARED TO DO ANYTHING TO END YOUR LIFE?: NO

## 2024-09-12 ASSESSMENT — PATIENT HEALTH QUESTIONNAIRE - PHQ9
1. LITTLE INTEREST OR PLEASURE IN DOING THINGS: NOT AT ALL
2. FEELING DOWN, DEPRESSED OR HOPELESS: NOT AT ALL
SUM OF ALL RESPONSES TO PHQ9 QUESTIONS 1 AND 2: 0

## 2024-09-12 NOTE — PROGRESS NOTES
59-year-old male presenting for follow-up on multiple concerns.    HTN, CHF  Stable, tolerating current regimen well.     DMII  Stable, doing very well with no regimen, no longer taking insulin.     CKD, anemia  Stable, follows with nephrology.      Gout  Stable, tolerating current regimen well.     Vitamin D deficiency  Stable, tolerating current regimen well.     RODDY  Reports compliance with CPAP     GERD, chronic diarrhea  Stable    SocHx:   - Smoking: Quit over 20 years ago    Denies HA, changes in vision, chest pain, SOB/ALMEIDA, palpitations, N/V/D/C, dysuria/hematuria, hematochezia/melena, paresthesias    12 point ROS reviewed and negative other than as stated in HPI      General: Alert, oriented, pleasant, in no acute distress  HEENT:      Head: normocephalic, atraumatic;      eyes: EOMI, no scleral icterus;   Neck: soft, supple, non-tender, no masses appreciated  CV: Heart with regular rate and rhythm, normal S1/S2, no murmurs  Lungs: CTAB without wheezing, rhonchi or rales; good respiratory effort, no increased work of breathing  Abdomen: soft, non-tender, non-distended, no masses appreciated, limited by body habitus  Extremities: no edema, wearing compression stockings, no cyanosis  Neuro: Cranial nerves grossly intact; alert and oriented, normal gait  Psych: Appropriate mood and affect    #HTN #CHF  - At goal in office  - Continue amlodipine 10 mg daily, chlorthalidone 25 mg daily, hydralazine 25 mg twice daily, metoprolol succinate 100 mg daily, isosorbide dinitrate 10 mg twice daily  - Continue to follow with cardiology and nephrology     #DMII  -Last A1c 6.7, 03/2024  -Continue Mounjaro 5 mg weekly, Farxiga 10 mg daily  - Repeat A1c  - Continue to titrate up regimen through [facility] pharmacist    #HLD  -Recommended rosuvastatin in the past, wants to work on diet  - Repeat lipid panel     # CKD #Anemia  - Likely thalassemia versus anemia of chronic disease  - Continue to follow with nephrology     #Gout  -  Continue allopurinol 300 mg daily, colchicine as needed, nephrotoxic warnings have been given     #Vitamin D deficiency  - Continue vitamin D supplements     #RODDY  - Continue with CPAP, reports compliance     #GERD  - Continue pantoprazole 40 mg daily     #Chronic diarrhea  -Continue daily fiber    #Hyperparathyroidism  -Following with nephrology    #Chronic low back pain  - Plain films showed degenerative changes last year  - Physical therapy referral    F/U 6 months, sooner if indicated, CPE at that time    Chris D'Amico, DO

## 2024-09-13 ENCOUNTER — APPOINTMENT (OUTPATIENT)
Dept: PHARMACY | Facility: HOSPITAL | Age: 59
End: 2024-09-13
Payer: COMMERCIAL

## 2024-09-16 ENCOUNTER — PHARMACY VISIT (OUTPATIENT)
Dept: PHARMACY | Facility: CLINIC | Age: 59
End: 2024-09-16
Payer: COMMERCIAL

## 2024-09-16 ENCOUNTER — LAB (OUTPATIENT)
Dept: LAB | Facility: LAB | Age: 59
End: 2024-09-16
Payer: COMMERCIAL

## 2024-09-16 DIAGNOSIS — G47.33 OSA (OBSTRUCTIVE SLEEP APNEA): ICD-10-CM

## 2024-09-16 DIAGNOSIS — D64.9 ANEMIA, UNSPECIFIED TYPE: ICD-10-CM

## 2024-09-16 DIAGNOSIS — E78.5 HYPERLIPIDEMIA, UNSPECIFIED HYPERLIPIDEMIA TYPE: ICD-10-CM

## 2024-09-16 DIAGNOSIS — I10 HTN (HYPERTENSION), BENIGN: ICD-10-CM

## 2024-09-16 DIAGNOSIS — N18.32 STAGE 3B CHRONIC KIDNEY DISEASE (MULTI): ICD-10-CM

## 2024-09-16 DIAGNOSIS — K21.9 GASTROESOPHAGEAL REFLUX DISEASE, UNSPECIFIED WHETHER ESOPHAGITIS PRESENT: ICD-10-CM

## 2024-09-16 DIAGNOSIS — I50.9 CONGESTIVE HEART FAILURE, UNSPECIFIED HF CHRONICITY, UNSPECIFIED HEART FAILURE TYPE: ICD-10-CM

## 2024-09-16 DIAGNOSIS — Z79.4 TYPE 2 DIABETES MELLITUS WITHOUT COMPLICATION, WITH LONG-TERM CURRENT USE OF INSULIN (MULTI): ICD-10-CM

## 2024-09-16 DIAGNOSIS — E11.9 TYPE 2 DIABETES MELLITUS WITHOUT COMPLICATION, WITH LONG-TERM CURRENT USE OF INSULIN (MULTI): ICD-10-CM

## 2024-09-16 DIAGNOSIS — M10.9 GOUT, UNSPECIFIED CAUSE, UNSPECIFIED CHRONICITY, UNSPECIFIED SITE: ICD-10-CM

## 2024-09-16 LAB
CHOLEST SERPL-MCNC: 151 MG/DL (ref 0–199)
CHOLESTEROL/HDL RATIO: 4.8
EST. AVERAGE GLUCOSE BLD GHB EST-MCNC: 163 MG/DL
HBA1C MFR BLD: 7.3 %
HDLC SERPL-MCNC: 31.6 MG/DL
LDLC SERPL CALC-MCNC: 89 MG/DL
NON HDL CHOLESTEROL: 119 MG/DL (ref 0–149)
TRIGL SERPL-MCNC: 153 MG/DL (ref 0–149)
VLDL: 31 MG/DL (ref 0–40)

## 2024-09-16 PROCEDURE — 83036 HEMOGLOBIN GLYCOSYLATED A1C: CPT

## 2024-09-16 PROCEDURE — 36415 COLL VENOUS BLD VENIPUNCTURE: CPT

## 2024-09-16 PROCEDURE — 80061 LIPID PANEL: CPT

## 2024-09-17 ENCOUNTER — APPOINTMENT (OUTPATIENT)
Dept: PHARMACY | Facility: HOSPITAL | Age: 59
End: 2024-09-17
Payer: COMMERCIAL

## 2024-09-17 DIAGNOSIS — E11.9 DIABETES MELLITUS TYPE 2 WITHOUT RETINOPATHY (MULTI): Primary | ICD-10-CM

## 2024-09-17 RX ORDER — TIRZEPATIDE 7.5 MG/.5ML
7.5 INJECTION, SOLUTION SUBCUTANEOUS WEEKLY
Qty: 2 ML | Refills: 5 | Status: SHIPPED | OUTPATIENT
Start: 2024-09-17

## 2024-09-17 NOTE — PROGRESS NOTES
Pharmacist Clinic: Diabetes Management  Shola Caraballo is a 59 y.o. male was referred to Clinical Pharmacy Team for diabetes management.     Referring Provider: Christopher D'Amico, DO     HISTORY OF PRESENT ILLNESS  Approximate Date of Diagnosis: 5 + years   Known complications due to diabetes included chronic kidney disease, obesity     Diet: historically had 2-3 meals per day and he did not limit what he ate  - since starting mounjaro patients appetite has decreased significantly   - starts his day with a protein shake and focuses on eating fruits and veggies throughout the day      LAB REVIEW   Glucose (mg/dL)   Date Value   08/12/2024 73 (L)   03/11/2024 106 (H)   12/11/2023 69 (L)     Hemoglobin A1C (%)   Date Value   09/16/2024 7.3 (H)   03/11/2024 6.7 (H)   12/11/2023 7.6 (H)     Bicarbonate (mmol/L)   Date Value   08/12/2024 29   03/11/2024 29   12/11/2023 25     Urea Nitrogen (mg/dL)   Date Value   08/12/2024 46 (H)   03/11/2024 47 (H)   12/11/2023 53 (H)     Creatinine (mg/dL)   Date Value   08/12/2024 1.86 (H)   03/11/2024 1.70 (H)   12/11/2023 1.85 (H)     Lab Results   Component Value Date    HGBA1C 7.3 (H) 09/16/2024    HGBA1C 6.7 (H) 03/11/2024    HGBA1C 7.6 (H) 12/11/2023     Lab Results   Component Value Date    CHOL 151 09/16/2024    CHOL 172 03/11/2024    CHOL 151 12/11/2023     Lab Results   Component Value Date    HDL 31.6 09/16/2024    HDL 35.6 03/11/2024    HDL 30.9 12/11/2023     Lab Results   Component Value Date    LDLCALC 89 09/16/2024    LDLCALC 111 (H) 03/11/2024    LDLCALC 96 12/11/2023     Lab Results   Component Value Date    TRIG 153 (H) 09/16/2024    TRIG 129 03/11/2024    TRIG 120 12/11/2023       DIABETES ASSESSMENT    CURRENT PHARMACOTHERAPY  - mounjaro 5 mg under the skin once weekly (Sundays)  - farxiga 10 mg once daily     SECONDARY PREVENTION  - Statin? No  - ACE-I/ARB? No    HISTORICAL PHARMACOTHERAPY  - metformin: discontinued due to kidney function   - tresiba 20 units  once daily: non-adherent (2 injections per week)  - aspart 20 units (patient thinks): non-adherent (maybe 1 injection per week)   - trulicity: transitioned to mounjaro for metabolic benefits     SMBG MEASUREMENTS:  range    DISCUSSION:   Diabetes:   A1c came back slightly elevated at 7.3%  Mounjaro:   We increased the dose to 7.5 mg, however patient experienced diarrhea in the past   We will try to increase again as he has been on 5 mg for an extended time and likely will tolerate the dose titration better   Counseled patient, answered all questions and concerns  Hyperlipidemia:   Patient is unwilling to consider a cholesterol medication at this time   Counseled on the benefits of starting a med, counseled on the risks of high cholesterol     Employee Health Diabetes Program (VBID)  - Patient enrolled in  Employee diabetes program for $0 co-pays on diabetes medications/supplies. Enrollment should be active in 2-4 weeks  - Requested VB[employer] enrollment date: 2/19/24    RECOMMENDATIONS/PLAN  1. Patients diabetes is worsening with most recent A1c of 7.3 % (goal < 7 %).   - Continue all meds under the continuation of care with the referring provider and clinical pharmacy team.   - Increase mounjaro to 7.5 mg once weekly.     2. Future considerations:  - reconsider rosuvastatin     Clinical Pharmacist follow up: 1 month     Thank you,  Theresa Davis, PharmD    Verbal consent to manage patient's drug therapy was obtained from the patient. They were informed they may decline to participate or withdraw from participation in pharmacy services at any time.

## 2024-09-17 NOTE — RESULT ENCOUNTER NOTE
Hemoglobin A1c at 7.3, I would recommend continuing to increase Mounjaro.    Cholesterol is above goal, I still recommend starting rosuvastatin 10 mg daily if amenable.

## 2024-09-23 ENCOUNTER — PHARMACY VISIT (OUTPATIENT)
Dept: PHARMACY | Facility: CLINIC | Age: 59
End: 2024-09-23
Payer: COMMERCIAL

## 2024-09-23 PROCEDURE — RXMED WILLOW AMBULATORY MEDICATION CHARGE

## 2024-10-15 ENCOUNTER — APPOINTMENT (OUTPATIENT)
Dept: PHARMACY | Facility: HOSPITAL | Age: 59
End: 2024-10-15
Payer: COMMERCIAL

## 2024-10-15 DIAGNOSIS — E11.9 DIABETES MELLITUS TYPE 2 WITHOUT RETINOPATHY (MULTI): Primary | ICD-10-CM

## 2024-10-15 NOTE — PROGRESS NOTES
Pharmacist Clinic: Diabetes Management  Shola Caraballo is a 59 y.o. male was referred to Clinical Pharmacy Team for diabetes management.     Referring Provider: Christopher D'Amico, DO     HISTORY OF PRESENT ILLNESS  Approximate Date of Diagnosis: 5 + years   Known complications due to diabetes included chronic kidney disease, obesity     Diet: historically had 2-3 meals per day and he did not limit what he ate  - since starting mounjaro patients appetite has decreased significantly   - starts his day with a protein shake and focuses on eating fruits and veggies throughout the day      LAB REVIEW   Glucose (mg/dL)   Date Value   08/12/2024 73 (L)   03/11/2024 106 (H)   12/11/2023 69 (L)     Hemoglobin A1C (%)   Date Value   09/16/2024 7.3 (H)   03/11/2024 6.7 (H)   12/11/2023 7.6 (H)     Bicarbonate (mmol/L)   Date Value   08/12/2024 29   03/11/2024 29   12/11/2023 25     Urea Nitrogen (mg/dL)   Date Value   08/12/2024 46 (H)   03/11/2024 47 (H)   12/11/2023 53 (H)     Creatinine (mg/dL)   Date Value   08/12/2024 1.86 (H)   03/11/2024 1.70 (H)   12/11/2023 1.85 (H)     Lab Results   Component Value Date    HGBA1C 7.3 (H) 09/16/2024    HGBA1C 6.7 (H) 03/11/2024    HGBA1C 7.6 (H) 12/11/2023     Lab Results   Component Value Date    CHOL 151 09/16/2024    CHOL 172 03/11/2024    CHOL 151 12/11/2023     Lab Results   Component Value Date    HDL 31.6 09/16/2024    HDL 35.6 03/11/2024    HDL 30.9 12/11/2023     Lab Results   Component Value Date    LDLCALC 89 09/16/2024    LDLCALC 111 (H) 03/11/2024    LDLCALC 96 12/11/2023     Lab Results   Component Value Date    TRIG 153 (H) 09/16/2024    TRIG 129 03/11/2024    TRIG 120 12/11/2023       DIABETES ASSESSMENT    CURRENT PHARMACOTHERAPY  - mounjaro 7.5 mg under the skin once weekly (Sundays)  - farxiga 10 mg once daily     SECONDARY PREVENTION  - Statin? No  - ACE-I/ARB? No    HISTORICAL PHARMACOTHERAPY  - metformin: discontinued due to kidney function   - tresiba 20  units once daily: non-adherent (2 injections per week)  - aspart 20 units (patient thinks): non-adherent (maybe 1 injection per week)   - trulicity: transitioned to mounjaro for metabolic benefits     SMBG MEASUREMENTS:  range    DISCUSSION:   Diabetes:   A1c came back slightly elevated at 7.3%  Mounjaro:   Patient is tolerating the increased dose   He reports he is having mild diarrhea but he does not have any pain and it is not impacting his day to day   Discussed continuing on current dose and we will follow up in 1 month  Hyperlipidemia:   Patient is unwilling to consider a cholesterol medication at this time   Counseled on the benefits of starting a med, counseled on the risks of high cholesterol     Employee Health Diabetes Program (VBID)  - Patient enrolled in  Employee diabetes program for $0 co-pays on diabetes medications/supplies. Enrollment should be active in 2-4 weeks  - Requested VB[employer] enrollment date: 2/19/24    RECOMMENDATIONS/PLAN  1. Patients diabetes is worsening with most recent A1c of 7.3 % (goal < 7 %).   - Continue all meds under the continuation of care with the referring provider and clinical pharmacy team.     2. Future considerations:  - reconsider rosuvastatin     Clinical Pharmacist follow up: 1 month     Thank you,  Theresa Davis, PharmD    Verbal consent to manage patient's drug therapy was obtained from the patient. They were informed they may decline to participate or withdraw from participation in pharmacy services at any time.

## 2024-10-16 DIAGNOSIS — N18.30 STAGE 3 CHRONIC KIDNEY DISEASE, UNSPECIFIED WHETHER STAGE 3A OR 3B CKD (MULTI): ICD-10-CM

## 2024-10-16 PROCEDURE — RXMED WILLOW AMBULATORY MEDICATION CHARGE

## 2024-10-16 RX ORDER — HYDRALAZINE HYDROCHLORIDE 25 MG/1
25 TABLET, FILM COATED ORAL 2 TIMES DAILY
Qty: 60 TABLET | Refills: 1 | Status: SHIPPED | OUTPATIENT
Start: 2024-10-16 | End: 2024-12-15

## 2024-10-18 ENCOUNTER — PHARMACY VISIT (OUTPATIENT)
Dept: PHARMACY | Facility: CLINIC | Age: 59
End: 2024-10-18
Payer: COMMERCIAL

## 2024-10-21 ENCOUNTER — PHARMACY VISIT (OUTPATIENT)
Dept: PHARMACY | Facility: CLINIC | Age: 59
End: 2024-10-21
Payer: COMMERCIAL

## 2024-10-23 DIAGNOSIS — I50.30 HEART FAILURE WITH PRESERVED EJECTION FRACTION, UNSPECIFIED HF CHRONICITY: ICD-10-CM

## 2024-10-23 DIAGNOSIS — I15.8 OTHER SECONDARY HYPERTENSION: ICD-10-CM

## 2024-10-23 PROCEDURE — RXMED WILLOW AMBULATORY MEDICATION CHARGE

## 2024-10-23 RX ORDER — CHLORTHALIDONE 25 MG/1
TABLET ORAL
Qty: 90 TABLET | Refills: 3 | Status: SHIPPED | OUTPATIENT
Start: 2024-10-23

## 2024-10-23 RX ORDER — CALCITRIOL 0.25 UG/1
0.25 CAPSULE ORAL 3 TIMES WEEKLY
Qty: 36 CAPSULE | Refills: 3 | Status: SHIPPED | OUTPATIENT
Start: 2024-10-23 | End: 2025-10-23

## 2024-10-29 ENCOUNTER — PHARMACY VISIT (OUTPATIENT)
Dept: PHARMACY | Facility: CLINIC | Age: 59
End: 2024-10-29
Payer: COMMERCIAL

## 2024-10-30 ENCOUNTER — APPOINTMENT (OUTPATIENT)
Dept: PHARMACY | Facility: HOSPITAL | Age: 59
End: 2024-10-30
Payer: COMMERCIAL

## 2024-11-04 DIAGNOSIS — I50.30 HEART FAILURE WITH PRESERVED EJECTION FRACTION, UNSPECIFIED HF CHRONICITY: ICD-10-CM

## 2024-11-04 PROCEDURE — RXMED WILLOW AMBULATORY MEDICATION CHARGE

## 2024-11-04 RX ORDER — METOPROLOL SUCCINATE 100 MG/1
100 TABLET, EXTENDED RELEASE ORAL DAILY
Qty: 90 TABLET | Refills: 3 | Status: SHIPPED | OUTPATIENT
Start: 2024-11-04 | End: 2025-11-04

## 2024-11-06 ENCOUNTER — PHARMACY VISIT (OUTPATIENT)
Dept: PHARMACY | Facility: CLINIC | Age: 59
End: 2024-11-06
Payer: COMMERCIAL

## 2024-11-12 ENCOUNTER — APPOINTMENT (OUTPATIENT)
Dept: PHARMACY | Facility: HOSPITAL | Age: 59
End: 2024-11-12
Payer: COMMERCIAL

## 2024-11-12 DIAGNOSIS — E11.9 DIABETES MELLITUS TYPE 2 WITHOUT RETINOPATHY (MULTI): Primary | ICD-10-CM

## 2024-11-12 PROCEDURE — RXMED WILLOW AMBULATORY MEDICATION CHARGE

## 2024-11-12 RX ORDER — TIRZEPATIDE 10 MG/.5ML
10 INJECTION, SOLUTION SUBCUTANEOUS WEEKLY
Qty: 2 ML | Refills: 5 | Status: SHIPPED | OUTPATIENT
Start: 2024-11-12

## 2024-11-12 NOTE — PROGRESS NOTES
Pharmacist Clinic: Diabetes Management  Shola Caraballo is a 59 y.o. male was referred to Clinical Pharmacy Team for diabetes management.     Referring Provider: Christopher D'Amico, DO     HISTORY OF PRESENT ILLNESS  Approximate Date of Diagnosis: 5 + years   Known complications due to diabetes included chronic kidney disease, obesity     Diet: historically had 2-3 meals per day and he did not limit what he ate  - since starting mounjaro patients appetite has decreased significantly   - starts his day with a protein shake and focuses on eating fruits and veggies throughout the day      LAB REVIEW   Glucose (mg/dL)   Date Value   08/12/2024 73 (L)   03/11/2024 106 (H)   12/11/2023 69 (L)     Hemoglobin A1C (%)   Date Value   09/16/2024 7.3 (H)   03/11/2024 6.7 (H)   12/11/2023 7.6 (H)     Bicarbonate (mmol/L)   Date Value   08/12/2024 29   03/11/2024 29   12/11/2023 25     Urea Nitrogen (mg/dL)   Date Value   08/12/2024 46 (H)   03/11/2024 47 (H)   12/11/2023 53 (H)     Creatinine (mg/dL)   Date Value   08/12/2024 1.86 (H)   03/11/2024 1.70 (H)   12/11/2023 1.85 (H)     Lab Results   Component Value Date    HGBA1C 7.3 (H) 09/16/2024    HGBA1C 6.7 (H) 03/11/2024    HGBA1C 7.6 (H) 12/11/2023     Lab Results   Component Value Date    CHOL 151 09/16/2024    CHOL 172 03/11/2024    CHOL 151 12/11/2023     Lab Results   Component Value Date    HDL 31.6 09/16/2024    HDL 35.6 03/11/2024    HDL 30.9 12/11/2023     Lab Results   Component Value Date    LDLCALC 89 09/16/2024    LDLCALC 111 (H) 03/11/2024    LDLCALC 96 12/11/2023     Lab Results   Component Value Date    TRIG 153 (H) 09/16/2024    TRIG 129 03/11/2024    TRIG 120 12/11/2023       DIABETES ASSESSMENT    CURRENT PHARMACOTHERAPY  - mounjaro 7.5 mg under the skin once weekly (Sundays)  - farxiga 10 mg once daily     SECONDARY PREVENTION  - Statin? No  - ACE-I/ARB? No    HISTORICAL PHARMACOTHERAPY  - metformin: discontinued due to kidney function   - tresiba 20  units once daily: non-adherent (2 injections per week)  - aspart 20 units (patient thinks): non-adherent (maybe 1 injection per week)   - trulicity: transitioned to mounjaro for metabolic benefits     SMBG MEASUREMENTS:  range    DISCUSSION:   Diabetes:   A1c came back slightly elevated at 7.3%  Mounjaro:   Patient is tolerating the current medication regimen without issues   Counseled patient on increasing the dose to 10 mg for A1c lowering benefits and metabolic benefits   Answered all questions and concerns  Hyperlipidemia:   Patient is unwilling to consider a cholesterol medication at this time   Counseled on the benefits of starting a med, counseled on the risks of high cholesterol     Employee Health Diabetes Program (VBID)  - Patient enrolled in  Employee diabetes program for $0 co-pays on diabetes medications/supplies. Enrollment should be active in 2-4 weeks  - Requested ID enrollment date: 2/19/24    RECOMMENDATIONS/PLAN  1. Patients diabetes is worsening with most recent A1c of 7.3 % (goal < 7 %).   - Continue all meds under the continuation of care with the referring provider and clinical pharmacy team.   - Increase mounjaro to 10 mg under the skin once weekly.     2. Future considerations:  - reconsider rosuvastatin     Clinical Pharmacist follow up: 1 month     Thank you,  Theresa Davis, PharmD    Verbal consent to manage patient's drug therapy was obtained from the patient. They were informed they may decline to participate or withdraw from participation in pharmacy services at any time.

## 2024-11-13 ENCOUNTER — PHARMACY VISIT (OUTPATIENT)
Dept: PHARMACY | Facility: CLINIC | Age: 59
End: 2024-11-13
Payer: COMMERCIAL

## 2024-12-02 PROCEDURE — RXMED WILLOW AMBULATORY MEDICATION CHARGE

## 2024-12-04 ENCOUNTER — PHARMACY VISIT (OUTPATIENT)
Dept: PHARMACY | Facility: CLINIC | Age: 59
End: 2024-12-04
Payer: COMMERCIAL

## 2024-12-10 ENCOUNTER — APPOINTMENT (OUTPATIENT)
Dept: PHARMACY | Facility: HOSPITAL | Age: 59
End: 2024-12-10
Payer: COMMERCIAL

## 2024-12-10 DIAGNOSIS — E11.9 DIABETES MELLITUS TYPE 2 WITHOUT RETINOPATHY (MULTI): Primary | ICD-10-CM

## 2024-12-10 NOTE — PROGRESS NOTES
Pharmacist Clinic: Diabetes Management  Shola Caraballo is a 59 y.o. male was referred to Clinical Pharmacy Team for diabetes management.     Referring Provider: Christopher D'Amico, DO     HISTORY OF PRESENT ILLNESS  Approximate Date of Diagnosis: 5 + years   Known complications due to diabetes included chronic kidney disease, obesity     Diet: historically had 2-3 meals per day and he did not limit what he ate  - since starting mounjaro patients appetite has decreased significantly   - starts his day with a protein shake and focuses on eating fruits and veggies throughout the day      LAB REVIEW   Glucose (mg/dL)   Date Value   08/12/2024 73 (L)   03/11/2024 106 (H)   12/11/2023 69 (L)     Hemoglobin A1C (%)   Date Value   09/16/2024 7.3 (H)   03/11/2024 6.7 (H)   12/11/2023 7.6 (H)     Bicarbonate (mmol/L)   Date Value   08/12/2024 29   03/11/2024 29   12/11/2023 25     Urea Nitrogen (mg/dL)   Date Value   08/12/2024 46 (H)   03/11/2024 47 (H)   12/11/2023 53 (H)     Creatinine (mg/dL)   Date Value   08/12/2024 1.86 (H)   03/11/2024 1.70 (H)   12/11/2023 1.85 (H)     Lab Results   Component Value Date    HGBA1C 7.3 (H) 09/16/2024    HGBA1C 6.7 (H) 03/11/2024    HGBA1C 7.6 (H) 12/11/2023     Lab Results   Component Value Date    CHOL 151 09/16/2024    CHOL 172 03/11/2024    CHOL 151 12/11/2023     Lab Results   Component Value Date    HDL 31.6 09/16/2024    HDL 35.6 03/11/2024    HDL 30.9 12/11/2023     Lab Results   Component Value Date    LDLCALC 89 09/16/2024    LDLCALC 111 (H) 03/11/2024    LDLCALC 96 12/11/2023     Lab Results   Component Value Date    TRIG 153 (H) 09/16/2024    TRIG 129 03/11/2024    TRIG 120 12/11/2023       DIABETES ASSESSMENT    CURRENT PHARMACOTHERAPY  - mounjaro 10 mg under the skin once weekly (Sundays)  - farxiga 10 mg once daily     SECONDARY PREVENTION  - Statin? No  - ACE-I/ARB? No    HISTORICAL PHARMACOTHERAPY  - metformin: discontinued due to kidney function   - tresiba 20  units once daily: non-adherent (2 injections per week)  - aspart 20 units (patient thinks): non-adherent (maybe 1 injection per week)   - trulicity: transitioned to mounjaro for metabolic benefits     SMBG MEASUREMENTS:  range    DISCUSSION:   Diabetes:   A1c came back slightly elevated at 7.3%  Mounjaro:   Patient is tolerating the current medication regimen without issues   He reports mild diarrhea but it is nothing intolerable   Answered all questions and concerns  Hyperlipidemia:   Patient is unwilling to consider a cholesterol medication at this time   Counseled on the benefits of starting a med, counseled on the risks of high cholesterol     Employee Health Diabetes Program (VBID)  - Patient enrolled in  Employee diabetes program for $0 co-pays on diabetes medications/supplies. Enrollment should be active in 2-4 weeks  - Requested VBID enrollment date: 2/19/24    RECOMMENDATIONS/PLAN  1. Patients diabetes is worsening with most recent A1c of 7.3 % (goal < 7 %).   - Continue all meds under the continuation of care with the referring provider and clinical pharmacy team.     2. Future considerations:  - reconsider rosuvastatin     Clinical Pharmacist follow up: 1 month     Thank you,  Theresa Davis, PharmD    Verbal consent to manage patient's drug therapy was obtained from the patient. They were informed they may decline to participate or withdraw from participation in pharmacy services at any time.

## 2024-12-11 ENCOUNTER — APPOINTMENT (OUTPATIENT)
Dept: NEPHROLOGY | Facility: CLINIC | Age: 59
End: 2024-12-11
Payer: COMMERCIAL

## 2024-12-11 VITALS
DIASTOLIC BLOOD PRESSURE: 61 MMHG | HEART RATE: 84 BPM | SYSTOLIC BLOOD PRESSURE: 101 MMHG | BODY MASS INDEX: 44.76 KG/M2 | WEIGHT: 315 LBS

## 2024-12-11 DIAGNOSIS — I50.30 HEART FAILURE WITH PRESERVED EJECTION FRACTION, UNSPECIFIED HF CHRONICITY: ICD-10-CM

## 2024-12-11 DIAGNOSIS — N18.30 STAGE 3 CHRONIC KIDNEY DISEASE, UNSPECIFIED WHETHER STAGE 3A OR 3B CKD (MULTI): Primary | ICD-10-CM

## 2024-12-11 PROCEDURE — 99214 OFFICE O/P EST MOD 30 MIN: CPT | Performed by: INTERNAL MEDICINE

## 2024-12-11 PROCEDURE — 4010F ACE/ARB THERAPY RXD/TAKEN: CPT | Performed by: INTERNAL MEDICINE

## 2024-12-11 PROCEDURE — 3051F HG A1C>EQUAL 7.0%<8.0%: CPT | Performed by: INTERNAL MEDICINE

## 2024-12-11 PROCEDURE — 3078F DIAST BP <80 MM HG: CPT | Performed by: INTERNAL MEDICINE

## 2024-12-11 PROCEDURE — 3062F POS MACROALBUMINURIA REV: CPT | Performed by: INTERNAL MEDICINE

## 2024-12-11 PROCEDURE — 3048F LDL-C <100 MG/DL: CPT | Performed by: INTERNAL MEDICINE

## 2024-12-11 PROCEDURE — 3074F SYST BP LT 130 MM HG: CPT | Performed by: INTERNAL MEDICINE

## 2024-12-11 ASSESSMENT — PATIENT HEALTH QUESTIONNAIRE - PHQ9
1. LITTLE INTEREST OR PLEASURE IN DOING THINGS: NOT AT ALL
SUM OF ALL RESPONSES TO PHQ9 QUESTIONS 1 & 2: 0
2. FEELING DOWN, DEPRESSED OR HOPELESS: NOT AT ALL

## 2024-12-11 NOTE — PATIENT INSTRUCTIONS
Decrease Amlodipine to 5 mg daily  Home BP check am and pm for 3 day starting Monday 12/16  Blood and urine tests at your earliest convenience  See you back in ~ 6 months.  Happy Holidays

## 2024-12-12 NOTE — PROGRESS NOTES
Shola Caraballo is a 59 y.o. male here in follow up for CKD    Subjective   Started Mounjaro by Dr. D'Amico, now at 10 mg weekly. Eating much less. No other changes in medications. BP here low, but asymptomatic. No leg swelling, dysuria, chest pain, dyspnea; no dizziness upon standing.        ROS  All the rest reviewed and negative    Objective     Vital signs  Patient Vitals for the past 24 hrs:   BP Pulse Weight   12/11/24 1125 101/61 84 --   12/11/24 1124 99/59 82 --   12/11/24 1112 101/61 83 150 kg (330 lb)       Physical Exam  Constitutional:  NAD  Psychiatric:  appropriate mood and behavior    HEENT: NC/AT, clear sclerae, moist mucous membranes  Cardiovascular: normal S1, S2, RRR,  no murmurs, gallop or rubs  Pulmonary:  Normal breath sounds  Extremities: no pretibial edema  Skin:  warm and dry    Meds    Current Outpatient Medications:     Accu-Chek Guide Glucose Meter [facility] [place], , Disp: , Rfl:     alcohol swabs pads, medicated, USE AS DIRECTED FOUR TIMES A DAY WHEN CHECKING BLOOD GLUCOSE, Disp: 400 each, Rfl: 3    allopurinol (Zyloprim) 300 mg tablet, Take 1 tablet (300 mg) by mouth once daily., Disp: 90 tablet, Rfl: 3    amLODIPine (Norvasc) 10 mg tablet, Take 1 tablet (10 mg) by mouth once daily., Disp: 90 tablet, Rfl: 3    ascorbic acid (Vitamin C) 1,000 mg tablet, Take 0.5 tablets (500 mg) by mouth once daily., Disp: , Rfl:     blood sugar diagnostic strip, USE 1 STRIP AS DIRECTED TO CHECK BLOOD GLUCOSE FOUR TIMES A DAY, Disp: 400 each, Rfl: 3    bumetanide (Bumex) 2 mg tablet, Take 1 tablet (2 mg) by mouth once daily., Disp: 90 tablet, Rfl: 3    calcitriol (Rocaltrol) 0.25 mcg capsule, Take 1 capsule (0.25 mcg) by mouth 3 times a week., Disp: 36 capsule, Rfl: 3    chlorthalidone (Hygroton) 25 mg tablet, Take 1 tablet by mouth daily, Disp: 90 tablet, Rfl: 3    colchicine 0.6 mg tablet, Take 1 tablet (0.6 mg) by mouth once daily as needed for gout flare., Disp: 10 tablet, Rfl: 1    dapagliflozin propanediol  (Farxiga) 10 mg, TAKE 1 TABLET BY MOUTH EVERY MORNING, Disp: 90 tablet, Rfl: 1    ferrous sulfate 325 (65 Fe) MG tablet, 3 tablet DAILY (route: oral), Disp: , Rfl:     glyBURIDE (Diabeta) 5 mg tablet, TAKE 2 TABLETS BY MOUTH TWO TIMES A DAY BEFORE BREAKFAST AND SUPPER. NEED TO MAKE APPOINTMENT WITH DOCTOR., Disp: 360 tablet, Rfl: 0    hydrALAZINE (Apresoline) 25 mg tablet, Take 1 tablet (25 mg) by mouth 2 times a day., Disp: 60 tablet, Rfl: 1    isosorbide dinitrate (Isordil) 10 mg tablet, Take 1 tablet (10 mg) by mouth once daily., Disp: 90 tablet, Rfl: 3    lancets misc, USE AS DIRECTED TO CHECK BLOOD GLUCOSE FOUR TIMES A DAY, Disp: 400 each, Rfl: 3    lisinopril 40 mg tablet, TAKE 1 TABLET BY MOUTH ONCE DAILY, Disp: 90 tablet, Rfl: 3    metoprolol succinate XL (Toprol-XL) 100 mg 24 hr tablet, TAKE 1 TABLET BY MOUTH ONCE DAILY, Disp: 90 tablet, Rfl: 3    multivitamin with minerals (DAILY VITAMIN FORMULA-MINERALS) tablet, 1 tablet DAILY (route: oral), Disp: , Rfl:     pantoprazole (ProtoNix) 40 mg EC tablet, Take 1 tablet (40 mg) by mouth once daily., Disp: 90 tablet, Rfl: 3    tirzepatide (Mounjaro) 10 mg/0.5 mL pen injector, Inject 10 mg under the skin 1 (one) time per week., Disp: 2 mL, Rfl: 5     Allergies  No Known Allergies     Results  Component      Latest Ref Rng 8/12/2024   Blood Urea Nitrogen      6 - 23 mg/dL 46 (H)    Creatinine      0.50 - 1.30 mg/dL 1.86 (H)    EGFR      >60 mL/min/1.73m*2 42 (L)      Component      Latest Ref Rng 8/12/2024   Albumin, Urine Random      Not established mg/L <7.0        Imaging results  CT ABDOMEN AND PELVIS WO CONTRAST;  6/14/2023   ADRENAL GLANDS:   Bilateral adrenal glands appear normal.    KIDNEYS AND URETERS:   The kidneys are normal in size and unremarkable in appearance.  No hydroureteronephrosis or nephroureterolithiasis is identified.   BLADDER:   The urinary bladder appears normal without abnormal wall thickening.     Assessment and Plan  Impression:  1. CKD  stage G3b/A1, likely diabetic and /or hypertensive kidney disease. Sickle cell trait positive.  Secondary FSGS and secondary amyloidosis possible but unlikely. On maximal dose of ACEi . Last s.creatinine 1.86 mg/dl in August (above) stable. No albuminuria per spot studies at the same time.   2. HTN -low today. Asymptomatic. Decreased Amlodipine dose to 5 mg daily.   3. Hyperparathyroidism of CKD and hypovitaminosis D (last measurements above) - on vitamin D supplements. Continue same.     Plan/recommendations:  - continue antihypertensives as are except change decrease Amlodipine to 5 mg daily  - home BP check am and pm for 3-5 days and send readings to me  - repeat labs per orders  - follow up with PCP, endocrinology and cardiology as scheduled     RTC in follow up in 4-6 months.     Jaida Baez MD      1/3/2025:  Patient sent home BP checks, as follows:  12/24/2024   to   12/29/2024    morning   evening  102/62   134/70   137/   140/78  142/82    137/78  132/74   134/74   125/73   129/73  118/73   123/70  Average:  Average  126/73   133/74    Will continue current medication management.

## 2024-12-13 PROCEDURE — RXMED WILLOW AMBULATORY MEDICATION CHARGE

## 2024-12-13 RX ORDER — AMLODIPINE BESYLATE 5 MG/1
5 TABLET ORAL DAILY
Qty: 90 TABLET | Refills: 3 | Status: SHIPPED | OUTPATIENT
Start: 2024-12-13 | End: 2025-12-13

## 2024-12-14 ENCOUNTER — PHARMACY VISIT (OUTPATIENT)
Dept: PHARMACY | Facility: CLINIC | Age: 59
End: 2024-12-14
Payer: COMMERCIAL

## 2024-12-15 RX ORDER — LANCETS
EACH MISCELLANEOUS
Qty: 400 EACH | Refills: 3 | OUTPATIENT
Start: 2024-12-15 | End: 2025-12-15

## 2024-12-15 RX ORDER — BLOOD SUGAR DIAGNOSTIC
STRIP MISCELLANEOUS
Qty: 400 EACH | Refills: 3 | OUTPATIENT
Start: 2024-12-15 | End: 2025-12-15

## 2024-12-16 ENCOUNTER — LAB (OUTPATIENT)
Dept: LAB | Facility: LAB | Age: 59
End: 2024-12-16
Payer: COMMERCIAL

## 2024-12-16 DIAGNOSIS — N18.30 STAGE 3 CHRONIC KIDNEY DISEASE, UNSPECIFIED WHETHER STAGE 3A OR 3B CKD (MULTI): ICD-10-CM

## 2024-12-16 LAB
ALBUMIN SERPL BCP-MCNC: 4.6 G/DL (ref 3.4–5)
ANION GAP SERPL CALC-SCNC: 14 MMOL/L (ref 10–20)
APPEARANCE UR: CLEAR
BILIRUB UR STRIP.AUTO-MCNC: NEGATIVE MG/DL
BUN SERPL-MCNC: 43 MG/DL (ref 6–23)
CALCIUM SERPL-MCNC: 9.8 MG/DL (ref 8.6–10.6)
CHLORIDE SERPL-SCNC: 98 MMOL/L (ref 98–107)
CO2 SERPL-SCNC: 31 MMOL/L (ref 21–32)
COLOR UR: COLORLESS
CREAT SERPL-MCNC: 1.66 MG/DL (ref 0.5–1.3)
CREAT UR-MCNC: 42.7 MG/DL (ref 20–370)
EGFRCR SERPLBLD CKD-EPI 2021: 47 ML/MIN/1.73M*2
ERYTHROCYTE [DISTWIDTH] IN BLOOD BY AUTOMATED COUNT: 19.3 % (ref 11.5–14.5)
EST. AVERAGE GLUCOSE BLD GHB EST-MCNC: 166 MG/DL
FERRITIN SERPL-MCNC: 264 NG/ML (ref 20–300)
GLUCOSE SERPL-MCNC: 144 MG/DL (ref 74–99)
GLUCOSE UR STRIP.AUTO-MCNC: ABNORMAL MG/DL
HBA1C MFR BLD: 7.4 %
HCT VFR BLD AUTO: 37.4 % (ref 41–52)
HGB BLD-MCNC: 11.9 G/DL (ref 13.5–17.5)
IRON SATN MFR SERPL: 21 % (ref 25–45)
IRON SERPL-MCNC: 66 UG/DL (ref 35–150)
KETONES UR STRIP.AUTO-MCNC: NEGATIVE MG/DL
LEUKOCYTE ESTERASE UR QL STRIP.AUTO: NEGATIVE
MCH RBC QN AUTO: 22.9 PG (ref 26–34)
MCHC RBC AUTO-ENTMCNC: 31.8 G/DL (ref 32–36)
MCV RBC AUTO: 72 FL (ref 80–100)
MICROALBUMIN UR-MCNC: <7 MG/L
MICROALBUMIN/CREAT UR: NORMAL MG/G{CREAT}
NITRITE UR QL STRIP.AUTO: NEGATIVE
NRBC BLD-RTO: 0.2 /100 WBCS (ref 0–0)
PH UR STRIP.AUTO: 5 [PH]
PHOSPHATE SERPL-MCNC: 3.4 MG/DL (ref 2.5–4.9)
PLATELET # BLD AUTO: 312 X10*3/UL (ref 150–450)
POTASSIUM SERPL-SCNC: 4.1 MMOL/L (ref 3.5–5.3)
PROT UR STRIP.AUTO-MCNC: NEGATIVE MG/DL
PTH-INTACT SERPL-MCNC: 117.9 PG/ML (ref 18.5–88)
RBC # BLD AUTO: 5.2 X10*6/UL (ref 4.5–5.9)
RBC # UR STRIP.AUTO: NEGATIVE /UL
SODIUM SERPL-SCNC: 139 MMOL/L (ref 136–145)
SP GR UR STRIP.AUTO: 1.01
TIBC SERPL-MCNC: 319 UG/DL (ref 240–445)
UIBC SERPL-MCNC: 253 UG/DL (ref 110–370)
UROBILINOGEN UR STRIP.AUTO-MCNC: NORMAL MG/DL
WBC # BLD AUTO: 9.8 X10*3/UL (ref 4.4–11.3)

## 2024-12-16 PROCEDURE — 83036 HEMOGLOBIN GLYCOSYLATED A1C: CPT

## 2024-12-16 PROCEDURE — RXMED WILLOW AMBULATORY MEDICATION CHARGE

## 2024-12-16 PROCEDURE — 82043 UR ALBUMIN QUANTITATIVE: CPT

## 2024-12-16 PROCEDURE — 80069 RENAL FUNCTION PANEL: CPT

## 2024-12-16 PROCEDURE — 83550 IRON BINDING TEST: CPT

## 2024-12-16 PROCEDURE — 82570 ASSAY OF URINE CREATININE: CPT

## 2024-12-16 PROCEDURE — 81003 URINALYSIS AUTO W/O SCOPE: CPT

## 2024-12-16 PROCEDURE — 83540 ASSAY OF IRON: CPT

## 2024-12-16 PROCEDURE — 85027 COMPLETE CBC AUTOMATED: CPT

## 2024-12-16 PROCEDURE — 83970 ASSAY OF PARATHORMONE: CPT

## 2024-12-16 PROCEDURE — 82728 ASSAY OF FERRITIN: CPT

## 2024-12-16 PROCEDURE — 36415 COLL VENOUS BLD VENIPUNCTURE: CPT

## 2024-12-17 ENCOUNTER — PHARMACY VISIT (OUTPATIENT)
Dept: PHARMACY | Facility: CLINIC | Age: 59
End: 2024-12-17
Payer: COMMERCIAL

## 2024-12-18 ENCOUNTER — PHARMACY VISIT (OUTPATIENT)
Dept: PHARMACY | Facility: CLINIC | Age: 59
End: 2024-12-18
Payer: COMMERCIAL

## 2024-12-30 ENCOUNTER — APPOINTMENT (OUTPATIENT)
Dept: OPHTHALMOLOGY | Facility: CLINIC | Age: 59
End: 2024-12-30
Payer: COMMERCIAL

## 2025-01-10 ENCOUNTER — APPOINTMENT (OUTPATIENT)
Dept: PHARMACY | Facility: HOSPITAL | Age: 60
End: 2025-01-10
Payer: COMMERCIAL

## 2025-01-11 DIAGNOSIS — N18.30 STAGE 3 CHRONIC KIDNEY DISEASE, UNSPECIFIED WHETHER STAGE 3A OR 3B CKD (MULTI): Primary | ICD-10-CM

## 2025-01-11 RX ORDER — LANCETS
EACH MISCELLANEOUS
Qty: 400 EACH | Refills: 3 | OUTPATIENT
Start: 2025-01-11 | End: 2026-01-11

## 2025-01-11 RX ORDER — ISOPROPYL ALCOHOL 70 ML/100ML
SWAB TOPICAL
Qty: 400 EACH | Refills: 3 | OUTPATIENT
Start: 2025-01-11 | End: 2026-01-11

## 2025-01-11 RX ORDER — AMLODIPINE BESYLATE 2.5 MG/1
2.5 TABLET ORAL DAILY
Qty: 30 TABLET | Refills: 5 | Status: SHIPPED | OUTPATIENT
Start: 2025-01-11 | End: 2025-07-10

## 2025-01-11 RX ORDER — BLOOD SUGAR DIAGNOSTIC
STRIP MISCELLANEOUS
Qty: 400 EACH | Refills: 3 | OUTPATIENT
Start: 2025-01-11 | End: 2026-01-11

## 2025-01-11 RX ORDER — HYDRALAZINE HYDROCHLORIDE 25 MG/1
25 TABLET, FILM COATED ORAL 2 TIMES DAILY
Qty: 60 TABLET | Refills: 1 | Status: CANCELLED | OUTPATIENT
Start: 2025-01-11 | End: 2025-03-12

## 2025-01-13 PROCEDURE — RXMED WILLOW AMBULATORY MEDICATION CHARGE

## 2025-01-15 ENCOUNTER — PHARMACY VISIT (OUTPATIENT)
Dept: PHARMACY | Facility: CLINIC | Age: 60
End: 2025-01-15
Payer: COMMERCIAL

## 2025-01-16 ENCOUNTER — TELEMEDICINE (OUTPATIENT)
Dept: PHARMACY | Facility: HOSPITAL | Age: 60
End: 2025-01-16
Payer: COMMERCIAL

## 2025-01-16 DIAGNOSIS — E11.9 DIABETES MELLITUS TYPE 2 WITHOUT RETINOPATHY (MULTI): ICD-10-CM

## 2025-01-16 DIAGNOSIS — I50.30 HEART FAILURE WITH PRESERVED EJECTION FRACTION, UNSPECIFIED HF CHRONICITY: ICD-10-CM

## 2025-01-16 PROCEDURE — RXMED WILLOW AMBULATORY MEDICATION CHARGE

## 2025-01-16 RX ORDER — TIRZEPATIDE 12.5 MG/.5ML
12.5 INJECTION, SOLUTION SUBCUTANEOUS WEEKLY
Qty: 2 ML | Refills: 2 | Status: SHIPPED | OUTPATIENT
Start: 2025-01-16

## 2025-01-16 RX ORDER — LANCETS
EACH MISCELLANEOUS
Qty: 100 EACH | Refills: 2 | Status: SHIPPED | OUTPATIENT
Start: 2025-01-16

## 2025-01-16 RX ORDER — DAPAGLIFLOZIN 10 MG/1
10 TABLET, FILM COATED ORAL
Qty: 90 TABLET | Refills: 1 | Status: SHIPPED | OUTPATIENT
Start: 2025-01-16

## 2025-01-16 RX ORDER — BLOOD SUGAR DIAGNOSTIC
STRIP MISCELLANEOUS
Qty: 100 STRIP | Refills: 3 | Status: SHIPPED | OUTPATIENT
Start: 2025-01-16

## 2025-01-16 NOTE — PROGRESS NOTES
Pharmacist Clinic: Diabetes Management  Shola Caraballo is a 59 y.o. male was referred to Clinical Pharmacy Team for diabetes management.     Referring Provider: Christopher D'Amico, DO     HISTORY OF PRESENT ILLNESS  Approximate Date of Diagnosis: 5 + years   Known complications due to diabetes included chronic kidney disease, obesity     Diet: historically had 2-3 meals per day and he did not limit what he ate  - since starting mounjaro patients appetite has decreased significantly   - starts his day with a protein shake and focuses on eating fruits and veggies throughout the day      LAB REVIEW   Glucose (mg/dL)   Date Value   12/16/2024 144 (H)   08/12/2024 73 (L)   03/11/2024 106 (H)     Hemoglobin A1C (%)   Date Value   12/16/2024 7.4 (H)   09/16/2024 7.3 (H)   03/11/2024 6.7 (H)     Bicarbonate (mmol/L)   Date Value   12/16/2024 31   08/12/2024 29   03/11/2024 29     Urea Nitrogen (mg/dL)   Date Value   12/16/2024 43 (H)   08/12/2024 46 (H)   03/11/2024 47 (H)     Creatinine (mg/dL)   Date Value   12/16/2024 1.66 (H)   08/12/2024 1.86 (H)   03/11/2024 1.70 (H)     Lab Results   Component Value Date    HGBA1C 7.4 (H) 12/16/2024    HGBA1C 7.3 (H) 09/16/2024    HGBA1C 6.7 (H) 03/11/2024     Lab Results   Component Value Date    CHOL 151 09/16/2024    CHOL 172 03/11/2024    CHOL 151 12/11/2023     Lab Results   Component Value Date    HDL 31.6 09/16/2024    HDL 35.6 03/11/2024    HDL 30.9 12/11/2023     Lab Results   Component Value Date    LDLCALC 89 09/16/2024    LDLCALC 111 (H) 03/11/2024    LDLCALC 96 12/11/2023     Lab Results   Component Value Date    TRIG 153 (H) 09/16/2024    TRIG 129 03/11/2024    TRIG 120 12/11/2023       DIABETES ASSESSMENT    CURRENT PHARMACOTHERAPY  - mounjaro 10 mg under the skin once weekly (Sundays)  - farxiga 10 mg once daily   - glyburide 5 mg once daily     SECONDARY PREVENTION  - Statin? No  - ACE-I/ARB? No    HISTORICAL PHARMACOTHERAPY  - metformin: discontinued due to  kidney function   - tresiba 20 units once daily: non-adherent (2 injections per week)  - aspart 20 units (patient thinks): non-adherent (maybe 1 injection per week)   - trulicity: transitioned to mounjaro for metabolic benefits     SMBG MEASUREMENTS:  range    DISCUSSION:   Diabetes:   A1c came back slightly elevated at 7.3% (goal under 7%)  Mounjaro:   Patient is tolerating the current medication regimen without issues   Discussed increasing to 12.5 mg once weekly in order to help get our A1c down to 7%  Hyperlipidemia:   Patient is unwilling to consider a cholesterol medication at this time   Counseled on the benefits of starting a med, counseled on the risks of high cholesterol     Employee Health Diabetes Program (VBID)  - Patient enrolled in  Employee diabetes program for $0 co-pays on diabetes medications/supplies. Enrollment should be active in 2-4 weeks  - Requested VBID enrollment date: 1/16/25    RECOMMENDATIONS/PLAN  1. Patients diabetes is worsening with most recent A1c of 7.3 % (goal < 7 %).   - Continue all meds under the continuation of care with the referring provider and clinical pharmacy team.   - Increase mounjaro to 12.5 mg under the skin once weekly.     2. Future considerations:  - reconsider rosuvastatin     Clinical Pharmacist follow up: 1 month, go over BG readings and BP readings    Thank you,  Theresa Davis, PharmD    Verbal consent to manage patient's drug therapy was obtained from the patient. They were informed they may decline to participate or withdraw from participation in pharmacy services at any time.

## 2025-01-17 ENCOUNTER — PHARMACY VISIT (OUTPATIENT)
Dept: PHARMACY | Facility: CLINIC | Age: 60
End: 2025-01-17
Payer: COMMERCIAL

## 2025-01-21 DIAGNOSIS — E11.9 DIABETES MELLITUS TYPE 2 WITHOUT RETINOPATHY (MULTI): ICD-10-CM

## 2025-01-21 DIAGNOSIS — E11.36 CATARACT ASSOCIATED WITH TYPE 2 DIABETES MELLITUS (MULTI): ICD-10-CM

## 2025-01-21 DIAGNOSIS — N18.30 STAGE 3 CHRONIC KIDNEY DISEASE, UNSPECIFIED WHETHER STAGE 3A OR 3B CKD (MULTI): ICD-10-CM

## 2025-01-21 DIAGNOSIS — M10.9 GOUT, UNSPECIFIED CAUSE, UNSPECIFIED CHRONICITY, UNSPECIFIED SITE: ICD-10-CM

## 2025-01-21 PROCEDURE — RXMED WILLOW AMBULATORY MEDICATION CHARGE

## 2025-01-21 RX ORDER — COLCHICINE 0.6 MG/1
0.6 TABLET ORAL DAILY PRN
Qty: 10 TABLET | Refills: 1 | Status: SHIPPED | OUTPATIENT
Start: 2025-01-21

## 2025-01-21 RX ORDER — ISOPROPYL ALCOHOL 70 ML/100ML
SWAB TOPICAL
Qty: 400 EACH | Refills: 3 | OUTPATIENT
Start: 2025-01-21 | End: 2026-01-21

## 2025-01-22 ENCOUNTER — PHARMACY VISIT (OUTPATIENT)
Dept: PHARMACY | Facility: CLINIC | Age: 60
End: 2025-01-22
Payer: COMMERCIAL

## 2025-01-22 RX ORDER — HYDRALAZINE HYDROCHLORIDE 25 MG/1
25 TABLET, FILM COATED ORAL 2 TIMES DAILY
Qty: 60 TABLET | Refills: 1 | Status: SHIPPED | OUTPATIENT
Start: 2025-01-22 | End: 2025-03-23

## 2025-01-23 ENCOUNTER — OFFICE VISIT (OUTPATIENT)
Dept: OPHTHALMOLOGY | Facility: CLINIC | Age: 60
End: 2025-01-23
Payer: COMMERCIAL

## 2025-01-23 DIAGNOSIS — E66.01 MORBID OBESITY DUE TO EXCESS CALORIES (MULTI): ICD-10-CM

## 2025-01-23 DIAGNOSIS — H52.7 REFRACTION ERROR: ICD-10-CM

## 2025-01-23 DIAGNOSIS — H11.132 CONJUNCTIVAL PIGMENTATION, LEFT: ICD-10-CM

## 2025-01-23 DIAGNOSIS — H25.813 COMBINED FORMS OF AGE-RELATED CATARACT OF BOTH EYES: ICD-10-CM

## 2025-01-23 DIAGNOSIS — E11.3392 MODERATE NONPROLIFERATIVE DIABETIC RETINOPATHY OF LEFT EYE WITHOUT MACULAR EDEMA ASSOCIATED WITH TYPE 2 DIABETES MELLITUS: Primary | ICD-10-CM

## 2025-01-23 PROBLEM — E11.29 TYPE 2 DIABETES MELLITUS WITH RENAL COMPLICATION: Status: RESOLVED | Noted: 2023-02-24 | Resolved: 2025-01-23

## 2025-01-23 PROBLEM — E11.9 DIABETES MELLITUS (MULTI): Status: RESOLVED | Noted: 2023-02-24 | Resolved: 2025-01-23

## 2025-01-23 PROBLEM — E11.9 TYPE 2 DIABETES MELLITUS: Status: RESOLVED | Noted: 2023-02-24 | Resolved: 2025-01-23

## 2025-01-23 PROBLEM — H52.4 BILATERAL PRESBYOPIA: Status: RESOLVED | Noted: 2023-03-14 | Resolved: 2025-01-23

## 2025-01-23 PROBLEM — Z79.4 INSULIN LONG-TERM USE (MULTI): Status: RESOLVED | Noted: 2023-02-24 | Resolved: 2025-01-23

## 2025-01-23 PROBLEM — E11.36 CATARACT ASSOCIATED WITH TYPE 2 DIABETES MELLITUS (MULTI): Status: RESOLVED | Noted: 2023-03-14 | Resolved: 2025-01-23

## 2025-01-23 PROBLEM — E11.9 DIABETES MELLITUS TYPE 2 WITHOUT RETINOPATHY (MULTI): Status: RESOLVED | Noted: 2023-03-14 | Resolved: 2025-01-23

## 2025-01-23 PROBLEM — H52.203 ASTIGMATISM, BILATERAL: Status: RESOLVED | Noted: 2023-03-14 | Resolved: 2025-01-23

## 2025-01-23 PROCEDURE — 99214 OFFICE O/P EST MOD 30 MIN: CPT | Performed by: OPHTHALMOLOGY

## 2025-01-23 PROCEDURE — 92015 DETERMINE REFRACTIVE STATE: CPT | Performed by: OPHTHALMOLOGY

## 2025-01-23 PROCEDURE — 99204 OFFICE O/P NEW MOD 45 MIN: CPT | Performed by: OPHTHALMOLOGY

## 2025-01-23 ASSESSMENT — KERATOMETRY
OD_K1POWER_DIOPTERS: 40.50
OS_AXISANGLE2_DEGREES: 175
OD_AXISANGLE_DEGREES: 85
OD_AXISANGLE2_DEGREES: 175
OD_K2POWER_DIOPTERS: 43.75
OS_AXISANGLE_DEGREES: 85
METHOD_AUTO_MANUAL: AUTOMATED
OS_K2POWER_DIOPTERS: 44.50
OS_K1POWER_DIOPTERS: 40.75

## 2025-01-23 ASSESSMENT — REFRACTION_WEARINGRX
OD_ADD: +2.00
OS_CYLINDER: -4.75
OD_SPHERE: PLANO
SPECS_TYPE: BIFOCAL
OS_ADD: +2.00
OS_AXIS: 176
OS_SPHERE: PLANO
OD_AXIS: 004
OD_CYLINDER: -4.75

## 2025-01-23 ASSESSMENT — REFRACTION_MANIFEST
OS_AXIS: 180
OS_CYLINDER: -4.25
OS_AXIS: 180
METHOD_AUTOREFRACTION: 1
OS_SPHERE: +0.75
OS_SPHERE: +0.25
OS_CYLINDER: -4.50
OD_SPHERE: +0.50
OD_CYLINDER: -4.75
OS_ADD: +2.50
OD_ADD: +2.50
OD_AXIS: 180
OD_AXIS: 175
OD_CYLINDER: -4.50
OD_SPHERE: +0.25

## 2025-01-23 ASSESSMENT — TONOMETRY
OD_IOP_MMHG: 19
IOP_METHOD: GOLDMANN APPLANATION
OS_IOP_MMHG: 18

## 2025-01-23 ASSESSMENT — VISUAL ACUITY
OD_CC: 20/40
CORRECTION_TYPE: GLASSES
OS_CC: 20/40
METHOD: SNELLEN - SINGLE

## 2025-01-23 ASSESSMENT — ENCOUNTER SYMPTOMS
CONSTITUTIONAL NEGATIVE: 0
NEUROLOGICAL NEGATIVE: 0
GASTROINTESTINAL NEGATIVE: 0
PSYCHIATRIC NEGATIVE: 0
RESPIRATORY NEGATIVE: 0
MUSCULOSKELETAL NEGATIVE: 0
HEMATOLOGIC/LYMPHATIC NEGATIVE: 0
EYES NEGATIVE: 0
ALLERGIC/IMMUNOLOGIC NEGATIVE: 0
CARDIOVASCULAR NEGATIVE: 0
ENDOCRINE NEGATIVE: 0

## 2025-01-23 ASSESSMENT — EXTERNAL EXAM - RIGHT EYE: OD_EXAM: NORMAL

## 2025-01-23 ASSESSMENT — SLIT LAMP EXAM - LIDS
COMMENTS: 1+ DERMATOCHALASIS - UPPER LID, 1+ BLEPHARITIS
COMMENTS: 1+ DERMATOCHALASIS - UPPER LID, 1+ BLEPHARITIS

## 2025-01-23 ASSESSMENT — CUP TO DISC RATIO
OS_RATIO: 0.45
OD_RATIO: 0.45

## 2025-01-23 ASSESSMENT — PAIN SCALES - GENERAL: PAINLEVEL_OUTOF10: 0-NO PAIN

## 2025-01-23 ASSESSMENT — EXTERNAL EXAM - LEFT EYE: OS_EXAM: NORMAL

## 2025-01-23 NOTE — PATIENT INSTRUCTIONS
Glasses prescription given.  Refer to retina for diabetic changes in the left eye.  Follow up in one year for a full exam.

## 2025-01-23 NOTE — PROGRESS NOTES
Subjective   Patient ID: Shola Caraballo is a 59 y.o. male.    Chief Complaint    New Patient Visit       HPI    Former pt of [facility].    Complete and diabetic dilated exam.  Was former pt of [facility] but has had many appts cancelled by  Ophthal dept.  No recent changes in health history or meds.  Vision essentially unchanged.  Diabetes mellitus (DM) for over 20 yrs.    Last edited by Maxx Mckay MD on 1/23/2025  1:52 PM.        No current outpatient medications on file. (Ophthalmology pharm classes)       Current Outpatient Medications (Other)   Medication Sig Dispense Refill    Accu-Chek Guide Glucose Meter misc       allopurinol (Zyloprim) 300 mg tablet Take 1 tablet (300 mg) by mouth once daily. 90 tablet 3    amLODIPine (Norvasc) 2.5 mg tablet Take 1 tablet (2.5 mg) by mouth once daily. 30 tablet 5    ascorbic acid (Vitamin C) 1,000 mg tablet Take 0.5 tablets (500 mg) by mouth once daily.      blood sugar diagnostic (Accu-Chek Guide test strips) strip Use to test blood sugar twice daily. 100 strip 3    bumetanide (Bumex) 2 mg tablet Take 1 tablet (2 mg) by mouth once daily. 90 tablet 3    calcitriol (Rocaltrol) 0.25 mcg capsule Take 1 capsule (0.25 mcg) by mouth 3 times a week. 36 capsule 3    chlorthalidone (Hygroton) 25 mg tablet Take 1 tablet by mouth daily 90 tablet 3    colchicine 0.6 mg tablet Take 1 tablet (0.6 mg) by mouth once daily as needed for gout flare. 10 tablet 1    dapagliflozin propanediol (Farxiga) 10 mg TAKE 1 TABLET BY MOUTH EVERY MORNING 90 tablet 1    ferrous sulfate 325 (65 Fe) MG tablet 3 tablet DAILY (route: oral)      glyBURIDE (Diabeta) 5 mg tablet TAKE 2 TABLETS BY MOUTH TWO TIMES A DAY BEFORE BREAKFAST AND SUPPER. NEED TO MAKE APPOINTMENT WITH DOCTOR. 360 tablet 0    hydrALAZINE (Apresoline) 25 mg tablet Take 1 tablet (25 mg) by mouth 2 times a day. 60 tablet 1    lancets misc USE TO TEST BLOOD SUGAR TWICE DAILY 100 each 2    metoprolol succinate XL (Toprol-XL) 100  mg 24 hr tablet TAKE 1 TABLET BY MOUTH ONCE DAILY 90 tablet 3    multivitamin with minerals (DAILY VITAMIN FORMULA-MINERALS) tablet 1 tablet DAILY (route: oral)      pantoprazole (ProtoNix) 40 mg EC tablet Take 1 tablet (40 mg) by mouth once daily. 90 tablet 3    tirzepatide (Mounjaro) 12.5 mg/0.5 mL pen injector Inject 12.5 mg under the skin 1 (one) time per week. 2 mL 2    alcohol swabs pads, medicated USE AS DIRECTED FOUR TIMES A DAY WHEN CHECKING BLOOD GLUCOSE 400 each 3    isosorbide dinitrate (Isordil) 10 mg tablet Take 1 tablet (10 mg) by mouth once daily. 90 tablet 3    lisinopril 40 mg tablet TAKE 1 TABLET BY MOUTH ONCE DAILY 90 tablet 3       Objective   Base Eye Exam       Visual Acuity (Snellen - Single)         Right Left Both    Dist cc 20/40 20/40     Near cc   J1+      Correction: Glasses              Tonometry (Goldmann Applanation, 1:12 PM)         Right Left    Pressure 19 18   Holding lids OU.              Pupils         Dark Shape React APD    Right 4 Round 1 None    Left 4 Round 1 None              Extraocular Movement         Right Left     Full Full              Dilation       Both eyes: 1% Tropic 2.5% Phen @ 1:12 PM                  Additional Tests       Keratometry (Automated)         K1 Axis K2 Axis    Right 40.50 175 43.75 85    Left 40.75 175 44.50 85                  Slit Lamp and Fundus Exam       External Exam         Right Left    External Normal Normal              Slit Lamp Exam         Right Left    Lids/Lashes 1+ Dermatochalasis - upper lid, 1+ Blepharitis 1+ Dermatochalasis - upper lid, 1+ Blepharitis    Conjunctiva/Sclera White and quiet pigmented conj nevus nasal    Cornea Clear Clear    Anterior Chamber Deep and quiet Deep and quiet    Iris Round and reactive Round and reactive    Lens 1+ Nuclear sclerosis, Trace Cortical cataract 1+ Nuclear sclerosis, Trace Cortical cataract    Anterior Vitreous Vitreous syneresis Vitreous syneresis              Fundus Exam         Right  Left    Disc Normal Normal    C/D Ratio 0.45 0.45    Macula maculopathy Exudates    Vessels Normal Normal    Periphery Normal Normal                  Refraction       Wearing Rx         Sphere Cylinder Axis Add    Right [place] -4.75 004 +2.00    Left [place] -4.75 176 +2.00      Age: 2017    Type: Bifocal              Manifest Refraction (Auto)         Sphere Cylinder [place] Dist VA Add Near VA    Right +0.50 -4.50 175       Left +0.75 -4.25 180         Pupillary Distance: 71              Manifest Refraction #2 (Subjective)         Sphere Cylinder [place] Dist VA Add Near VA    Right +0.25 -4.75 180 20/25 +2.50 J1+    Left +0.25 -4.50 180 20/25 -1 +2.50 J1+      Pupillary Distance: 71              Final Rx         Sphere Cylinder [place] Dist VA Add Near VA    Right +0.25 -4.75 180 20/25 +2.50 J1+    Left +0.25 -4.50 180 20/25 +2.50 J1+      Type: Bifocal    Expiration Date: 1/23/2027    Pupillary Distance: 71                    Assessment/Plan   Problem List Items Addressed This Visit          Eye/Vision problems    Moderate nonproliferative diabetic retinopathy of left eye without macular edema associated with type 2 diabetes mellitus - Primary     Refer to retina.          Combined forms of age-related cataract of both eyes    Refraction error     Spec rx given.  F/u one year full with oct mac.           Conjunctival pigmentation, left       Other    Morbid obesity due to excess calories (Multi)

## 2025-01-23 NOTE — LETTER
January 23, 2025    [place]  [address] [facility]  [facility] [address]     Patient: Shola Caraballo   YOB: 1965   Date of Visit: 1/23/2025       Dear Dr. Christopher D'Amico, DO:    Thank you for referring Shola Caraballo to me for evaluation. Here is my assessment and plan of care:    Assessment/Plan:  Shola was seen today for new patient visit.  Diagnoses and all orders for this visit:  Moderate nonproliferative diabetic retinopathy of left eye without macular edema associated with type 2 diabetes mellitus (Primary)  Combined forms of age-related cataract of both eyes  Morbid obesity due to excess calories (Multi)  Conjunctival pigmentation, left  Refraction error    Below you will find my full exam findings. If you have questions, please do not hesitate to call me. I look forward to following Shola along with you.     Moderate background diabetic retinopathy (BDR) OS.  Refer to retina for evaluation and possible treatment.  F/u one year full.      Sincerely,        Maxx Mckay MD        CC:   No Recipients        Base Eye Exam       Visual Acuity (Snellen - Single)         Right Left Both    Dist cc 20/40 20/40     Near cc   J1+      Correction: Glasses              Tonometry (Goldmann Applanation, 1:12 PM)         Right Left    Pressure 19 18   Holding lids OU.              Pupils         Dark Shape React APD    Right 4 Round 1 None    Left 4 Round 1 None              Extraocular Movement         Right Left     Full Full              Dilation       Both eyes: 1% Tropic 2.5% Phen @ 1:12 PM                  Additional Tests       Keratometry (Automated)         K1 Axis K2 Axis    Right 40.50 175 43.75 85    Left 40.75 175 44.50 85                  Slit Lamp and Fundus Exam       External Exam         Right Left    External Normal Normal              Slit Lamp Exam         Right Left    Lids/Lashes 1+ Dermatochalasis - upper lid, 1+  Blepharitis 1+ Dermatochalasis - upper lid, 1+ Blepharitis    Conjunctiva/Sclera White and quiet pigmented conj nevus nasal    Cornea Clear Clear    Anterior Chamber Deep and quiet Deep and quiet    Iris Round and reactive Round and reactive    Lens 1+ Nuclear sclerosis, Trace Cortical cataract 1+ Nuclear sclerosis, Trace Cortical cataract    Anterior Vitreous Vitreous syneresis Vitreous syneresis              Fundus Exam         Right Left    Disc Normal Normal    C/D Ratio 0.45 0.45    Macula maculopathy Exudates    Vessels Normal Normal    Periphery Normal Normal                  Refraction       Wearing Rx         Sphere Cylinder Axis Add    Right [place] -4.75 004 +2.00    Left [place] -4.75 176 +2.00      Age: 2017    Type: Bifocal              Manifest Refraction (Auto)         Sphere Cylinder [place] Dist VA Add Near VA    Right +0.50 -4.50 175       Left +0.75 -4.25 180         Pupillary Distance: 71              Manifest Refraction #2 (Subjective)         Sphere Cylinder [place] Dist VA Add Near VA    Right +0.25 -4.75 180 20/25 +2.50 J1+    Left +0.25 -4.50 180 20/25 -1 +2.50 J1+      Pupillary Distance: 71              Final Rx         Sphere Cylinder [place] Dist VA Add Near VA    Right +0.25 -4.75 180 20/25 +2.50 J1+    Left +0.25 -4.50 180 20/25 +2.50 J1+      Type: Bifocal    Expiration Date: 1/23/2027    Pupillary Distance: 71

## 2025-01-27 ENCOUNTER — APPOINTMENT (OUTPATIENT)
Dept: OPHTHALMOLOGY | Facility: CLINIC | Age: 60
End: 2025-01-27
Payer: COMMERCIAL

## 2025-01-27 PROCEDURE — RXMED WILLOW AMBULATORY MEDICATION CHARGE

## 2025-01-29 ENCOUNTER — PHARMACY VISIT (OUTPATIENT)
Dept: PHARMACY | Facility: CLINIC | Age: 60
End: 2025-01-29
Payer: COMMERCIAL

## 2025-02-03 PROCEDURE — RXMED WILLOW AMBULATORY MEDICATION CHARGE

## 2025-02-05 ENCOUNTER — PHARMACY VISIT (OUTPATIENT)
Dept: PHARMACY | Facility: CLINIC | Age: 60
End: 2025-02-05
Payer: COMMERCIAL

## 2025-02-13 ENCOUNTER — APPOINTMENT (OUTPATIENT)
Dept: PHARMACY | Facility: HOSPITAL | Age: 60
End: 2025-02-13
Payer: COMMERCIAL

## 2025-02-13 DIAGNOSIS — I50.30 HEART FAILURE WITH PRESERVED EJECTION FRACTION, UNSPECIFIED HF CHRONICITY: ICD-10-CM

## 2025-02-13 DIAGNOSIS — E11.9 DIABETES MELLITUS TYPE 2 WITHOUT RETINOPATHY (MULTI): Primary | ICD-10-CM

## 2025-02-13 NOTE — PROGRESS NOTES
Pharmacist Clinic: Diabetes Management  Shola Caraballo is a 59 y.o. male was referred to Clinical Pharmacy Team for diabetes management.     Referring Provider: Christopher D'Amico, DO     HISTORY OF PRESENT ILLNESS  Approximate Date of Diagnosis: 5 + years   Known complications due to diabetes included chronic kidney disease, obesity     Diet: historically had 2-3 meals per day and he did not limit what he ate  - since starting mounjaro patients appetite has decreased significantly   - starts his day with a protein shake and focuses on eating fruits and veggies throughout the day      LAB REVIEW   Glucose (mg/dL)   Date Value   12/16/2024 144 (H)   08/12/2024 73 (L)   03/11/2024 106 (H)     Hemoglobin A1C (%)   Date Value   12/16/2024 7.4 (H)   09/16/2024 7.3 (H)   03/11/2024 6.7 (H)     Bicarbonate (mmol/L)   Date Value   12/16/2024 31   08/12/2024 29   03/11/2024 29     Urea Nitrogen (mg/dL)   Date Value   12/16/2024 43 (H)   08/12/2024 46 (H)   03/11/2024 47 (H)     Creatinine (mg/dL)   Date Value   12/16/2024 1.66 (H)   08/12/2024 1.86 (H)   03/11/2024 1.70 (H)     Lab Results   Component Value Date    HGBA1C 7.4 (H) 12/16/2024    HGBA1C 7.3 (H) 09/16/2024    HGBA1C 6.7 (H) 03/11/2024     Lab Results   Component Value Date    CHOL 151 09/16/2024    CHOL 172 03/11/2024    CHOL 151 12/11/2023     Lab Results   Component Value Date    HDL 31.6 09/16/2024    HDL 35.6 03/11/2024    HDL 30.9 12/11/2023     Lab Results   Component Value Date    LDLCALC 89 09/16/2024    LDLCALC 111 (H) 03/11/2024    LDLCALC 96 12/11/2023     Lab Results   Component Value Date    TRIG 153 (H) 09/16/2024    TRIG 129 03/11/2024    TRIG 120 12/11/2023       DIABETES ASSESSMENT    CURRENT PHARMACOTHERAPY  - mounjaro 10 mg under the skin once weekly (Sundays)  - farxiga 10 mg once daily   - glyburide 5 mg once daily     SECONDARY PREVENTION  - Statin? No  - ACE-I/ARB? No    HISTORICAL PHARMACOTHERAPY  - metformin: discontinued due to  kidney function   - tresiba 20 units once daily: non-adherent (2 injections per week)  - aspart 20 units (patient thinks): non-adherent (maybe 1 injection per week)   - trulicity: transitioned to mounjaro for metabolic benefits     SMBG MEASUREMENTS:  range    DISCUSSION:   Diabetes:   A1c came back slightly elevated at 7.3% (goal under 7%)  Mounjaro:   Patient is tolerating the current medication regimen without issues   Discussed increasing to 12.5 mg once weekly in order to help get our A1c down to 7%  Hyperlipidemia:   Patient is unwilling to consider a cholesterol medication at this time   Counseled on the benefits of starting a med, counseled on the risks of high cholesterol     Employee Health Diabetes Program (VBID)  - Patient enrolled in  Employee diabetes program for $0 co-pays on diabetes medications/supplies. Enrollment should be active in 2-4 weeks  - Requested VBID enrollment date: 1/16/25    RECOMMENDATIONS/PLAN  1. Patients diabetes is worsening with most recent A1c of 7.3 % (goal < 7 %).   - Continue all meds under the continuation of care with the referring provider and clinical pharmacy team.   - Increase mounjaro to 12.5 mg under the skin once weekly.     2. Future considerations:  - reconsider rosuvastatin     Clinical Pharmacist follow up: 1 month, go over tolerability of mounjaro 12.5 mg    Thank you,  Theresa Davis, PharmD    Verbal consent to manage patient's drug therapy was obtained from the patient. They were informed they may decline to participate or withdraw from participation in pharmacy services at any time.

## 2025-02-23 DIAGNOSIS — I50.30 HEART FAILURE WITH PRESERVED EJECTION FRACTION, UNSPECIFIED HF CHRONICITY: ICD-10-CM

## 2025-02-24 PROCEDURE — RXMED WILLOW AMBULATORY MEDICATION CHARGE

## 2025-02-24 RX ORDER — LISINOPRIL 40 MG/1
40 TABLET ORAL DAILY
Qty: 90 TABLET | Refills: 3 | Status: SHIPPED | OUTPATIENT
Start: 2025-02-24 | End: 2026-02-24

## 2025-02-24 RX ORDER — BUMETANIDE 2 MG/1
2 TABLET ORAL DAILY
Qty: 90 TABLET | Refills: 3 | Status: SHIPPED | OUTPATIENT
Start: 2025-02-24 | End: 2026-02-24

## 2025-02-24 RX ORDER — ISOSORBIDE DINITRATE 10 MG/1
10 TABLET ORAL DAILY
Qty: 90 TABLET | Refills: 3 | Status: SHIPPED | OUTPATIENT
Start: 2025-02-24 | End: 2026-02-24

## 2025-02-25 PROCEDURE — RXMED WILLOW AMBULATORY MEDICATION CHARGE

## 2025-02-26 ENCOUNTER — PHARMACY VISIT (OUTPATIENT)
Dept: PHARMACY | Facility: CLINIC | Age: 60
End: 2025-02-26
Payer: COMMERCIAL

## 2025-02-28 ENCOUNTER — PHARMACY VISIT (OUTPATIENT)
Dept: PHARMACY | Facility: CLINIC | Age: 60
End: 2025-02-28
Payer: COMMERCIAL

## 2025-03-04 ENCOUNTER — OFFICE VISIT (OUTPATIENT)
Dept: CARDIOLOGY | Facility: HOSPITAL | Age: 60
End: 2025-03-04
Payer: COMMERCIAL

## 2025-03-04 VITALS
TEMPERATURE: 97.7 F | HEIGHT: 72 IN | HEART RATE: 117 BPM | SYSTOLIC BLOOD PRESSURE: 112 MMHG | BODY MASS INDEX: 42.66 KG/M2 | OXYGEN SATURATION: 98 % | WEIGHT: 315 LBS | DIASTOLIC BLOOD PRESSURE: 64 MMHG

## 2025-03-04 DIAGNOSIS — I50.30 HEART FAILURE WITH PRESERVED EJECTION FRACTION, UNSPECIFIED HF CHRONICITY: Primary | ICD-10-CM

## 2025-03-04 DIAGNOSIS — N18.30 STAGE 3 CHRONIC KIDNEY DISEASE, UNSPECIFIED WHETHER STAGE 3A OR 3B CKD (MULTI): ICD-10-CM

## 2025-03-04 PROCEDURE — 4010F ACE/ARB THERAPY RXD/TAKEN: CPT | Performed by: INTERNAL MEDICINE

## 2025-03-04 PROCEDURE — 3074F SYST BP LT 130 MM HG: CPT | Performed by: INTERNAL MEDICINE

## 2025-03-04 PROCEDURE — 1036F TOBACCO NON-USER: CPT | Performed by: INTERNAL MEDICINE

## 2025-03-04 PROCEDURE — 3008F BODY MASS INDEX DOCD: CPT | Performed by: INTERNAL MEDICINE

## 2025-03-04 PROCEDURE — 99213 OFFICE O/P EST LOW 20 MIN: CPT | Performed by: INTERNAL MEDICINE

## 2025-03-04 PROCEDURE — 3078F DIAST BP <80 MM HG: CPT | Performed by: INTERNAL MEDICINE

## 2025-03-04 PROCEDURE — 93010 ELECTROCARDIOGRAM REPORT: CPT | Performed by: INTERNAL MEDICINE

## 2025-03-04 PROCEDURE — 93005 ELECTROCARDIOGRAM TRACING: CPT | Performed by: INTERNAL MEDICINE

## 2025-03-04 RX ORDER — LISINOPRIL 40 MG/1
40 TABLET ORAL DAILY
Qty: 90 TABLET | Refills: 3 | OUTPATIENT
Start: 2025-03-04 | End: 2026-03-04

## 2025-03-04 RX ORDER — METOPROLOL SUCCINATE 100 MG/1
100 TABLET, EXTENDED RELEASE ORAL DAILY
Qty: 90 TABLET | Refills: 3 | OUTPATIENT
Start: 2025-03-04 | End: 2026-03-04

## 2025-03-04 RX ORDER — HYDRALAZINE HYDROCHLORIDE 25 MG/1
25 TABLET, FILM COATED ORAL 2 TIMES DAILY
Qty: 60 TABLET | Refills: 1 | OUTPATIENT
Start: 2025-03-04 | End: 2025-05-03

## 2025-03-04 RX ORDER — AMLODIPINE BESYLATE 2.5 MG/1
2.5 TABLET ORAL DAILY
Qty: 30 TABLET | Refills: 5 | OUTPATIENT
Start: 2025-03-04 | End: 2025-08-31

## 2025-03-04 RX ORDER — ISOSORBIDE DINITRATE 10 MG/1
10 TABLET ORAL DAILY
Qty: 90 TABLET | Refills: 3 | OUTPATIENT
Start: 2025-03-04 | End: 2026-03-04

## 2025-03-04 RX ORDER — BUMETANIDE 2 MG/1
2 TABLET ORAL DAILY
Qty: 90 TABLET | Refills: 3 | OUTPATIENT
Start: 2025-03-04 | End: 2026-03-04

## 2025-03-04 ASSESSMENT — PAIN SCALES - GENERAL: PAINLEVEL_OUTOF10: 0-NO PAIN

## 2025-03-04 ASSESSMENT — ENCOUNTER SYMPTOMS
OCCASIONAL FEELINGS OF UNSTEADINESS: 0
LOSS OF SENSATION IN FEET: 0
DEPRESSION: 0

## 2025-03-04 ASSESSMENT — COLUMBIA-SUICIDE SEVERITY RATING SCALE - C-SSRS
6. HAVE YOU EVER DONE ANYTHING, STARTED TO DO ANYTHING, OR PREPARED TO DO ANYTHING TO END YOUR LIFE?: NO
2. HAVE YOU ACTUALLY HAD ANY THOUGHTS OF KILLING YOURSELF?: NO
1. IN THE PAST MONTH, HAVE YOU WISHED YOU WERE DEAD OR WISHED YOU COULD GO TO SLEEP AND NOT WAKE UP?: NO

## 2025-03-04 NOTE — PROGRESS NOTES
Diagnoses/Problems  Assessed    · (HFpEF) heart failure with preserved ejection fraction (428.9) (I50.30)    Orders  (HFpEF) heart failure with preserved ejection fraction    · Electrocardiogram 12 Lead; Status:Active; Requested for:21Mar2023;   Unlinked    · Stop: tiZANidine HCl - 2 MG Oral Tablet    Patient Instructions    To reach Dr. Villaseñor's office please call [phone]. Fax [phone]. Call [phone] to schedule an appointment. You may also contact the HF RNs at [email or web address] (Please include your name and date of birth)    For MEDICATION REFILLS, please call [phone] option 6 then option 1.    1. No medication changes at this time.    2. Dr. Vlilaseñor will speak with your other doctors review your medications and determine a follow-up.              Chief Complaint      EVIE CARABALLO is being seen for an annual follow-up of heart failure and a routine medication evaluation.     Currently denies chest pain, palpitations, shortness of breath, dyspnea on exertion, orthopnea, PND. No edema noted in BLE. Patient denies headaches, dizziness or recent falls.      Hospitalizations: May 12-14 for small bowel obstruction      History of Present Illness  Mr Caraballo is a 58 year old man with a PMH significant for HFpEF,CKD  with  hHTN, DM, and morbid obesity who has been losing weight with medical management , exercise and managing his food intake   He is here for a routine follow up visit   Has no CV complaints , his BP has been well controlled during the prior office visits with PCP .   Of note he was admitted to [facility] end of February 2023 secondary to a left diabetic foot ulcer /cellulitis for which he underwent incision and drainage and bone biopsy he also had Bacteremia with Strep B , ADHF and TROY   Metoprolol was added to his regimen secondary to Premature beats          Social Hx: Denies smoking, vapping, ETOH, Illicit drug use.    Interval Hx:     Currently denies chest pain, palpitations,  shortness of breath, orthopnea, PND. No edema noted in BLE. Patient denies headaches, dizziness or recent falls.     Patient reports dyspnea on exertion with stairs.     Hospitalizations: Denies    February 2023 Echocardiogram   1. Left ventricular systolic function is normal with a 55-60% estimated ejection fraction.  2. Poorly visualized anatomical structures due to suboptimal image quality.    Comorbid Illnesses: HTN, DM, HLD, RODDY. He has no significant interval events.   Symptoms: stable lower extremity edema, denies dyspnea on exertion, denies fatigue, denies exercise intolerance, denies orthopnea and denies paroxysmal nocturnal dyspnea. Associated symptoms include no chest pain, no syncope and no palpitations.   Home Monitoring: The patient checks his weight regularly.   Lifestyle: Diet: He consumes a diverse and healthy diet.Exercise: He does not exercise regularly.Smoking: He does not use tobacco.Alcohol: He denies alcohol use.Drug Use: He denies drug use.   Medications: the patient is adherent with his medication regimen.      *Active Problems   Problems    · (HFpEF) heart failure with preserved ejection fraction (428.9) (I50.30)   · Arthritis (716.90) (M19.90)   · Astigmatism, bilateral (367.20) (H52.203)   · Bilateral presbyopia (367.4) (H52.4)   · Cataract associated with type 2 diabetes mellitus (250.50,366.41) (E11.36)   · Cellulitis of foot (682.7) (L03.119)   · Cough (786.2) (R05.9)   · COVID-19 virus detected (079.89) (U07.1)   · COVID-19 virus infection (079.89) (U07.1)   · Diabetes mellitus type 2 without retinopathy (250.00) (E11.9)   · Diabetic retinopathy (250.50,362.01) (E11.319)   · DM type 2, uncontrolled, with renal complications (250.42)   · Elevated parathyroid hormone (259.9) (E34.9)   · Foot osteomyelitis, left (730.27) (M86.9)   · Gout (274.9) (M10.9)   · Added by Problem List Migration; 2013-6-8; Moved to [facility] Nov 23 2013  4:10PM   · History of morbid obesity (V13.89) (Z86.39)    · HTN (hypertension), benign (401.1) (I10)   · Hyperlipidemia (272.4) (E78.5)   · Added by Problem List Migration; 2013-6-6; Moved to [facility] Nov 23 2013  4:10PM   · Immunization due (V05.9) (Z23)   · Incisional hernia (553.21) (K43.2)   · Low HDL (under 40) (272.5) (E78.6)   · Low vitamin D level (790.6) (R79.89)   · Nasal congestion with rhinorrhea (478.19) (R09.81,J34.89)   · Nuclear sclerosis of both eyes (366.16) (H25.13)   · Obstructive sleep apnea (327.23) (G47.33)   · Onychomycosis of toenail (110.1) (B35.1)   · Peripheral vascular disease (443.9) (I73.9)   · Prerenal azotemia (790.6) (R79.89)   · Pressure ulcer of dorsum of left foot (707.09,707.20) (L89.899)   · Refraction error (367.9) (H52.7)   · Sickle cell trait (282.5) (D57.3)   · Stage III chronic kidney disease (585.3) (N18.30)   · Added by Problem List Migration; 2013-6-8   · Ulcer of right leg (707.10) (L97.919)   · Uncontrolled hypertension (401.9) (I10)   · Added by Problem List Migration; 2013-6-6    Left-sided low back pain with left-sided sciatica (724.3) (M54.42)     Surgical History  Problems    · History of Appendectomy   · History of Hip replacement   · History of Ventral hernia repair    Past Medical History  Problems    · History of Acute gout (274.01) (M10.9)   · Resolved Date: 06 Oct 2021   · History of Acute osteomyelitis (730.00) (M86.10)   · Added by Problem List Migration; 2013-6-8   · History of Callus (700) (L84)   · Resolved Date: 13 Feb 2015   · History of Chronic combined systolic and diastolic congestive heart failure  (428.42,428.0) (I50.42)   · Resolved Date: 09 Jan 2020   · Diagnosed during admission 2008 with volume overload.  LVEF 45-50%.      9/2010: Preserved EF60-65%, LV mildly dilated with LVH, LAE   · History of Chronic Osteomyelitis Of The Toes (730.10)   · Resolved Date: 27 Jul 2020   · History of Chronic renal insufficiency (585.9) (N18.9)   · Resolved Date: 13 Feb 2015   · History of Chronic venous  hypertension w ulceration, right   · Resolved Date: 09 Jan 2020   · History of Chronic venous hypertension with inflammation involving both sides (459.32)  (I87.323)   · Resolved Date: 09 Jan 2020   · History of Contusion of toe (924.3) (S90.129A)   · Resolved Date: 06 Oct 2021   · History of Hand swelling (729.81) (M79.89)   · Resolved Date: 28 Jul 2021   · History of Hand swelling (729.81) (M79.89)   · Resolved Date: 28 Jul 2021   · History of anemia (V12.3) (Z86.2)   · Resolved Date: 27 Jul 2020   · History of congestive heart disease (V12.59) (Z86.79)   · Added by Problem List Migration; 2013-6-6   · History of hyperlipidemia (V12.29) (Z86.39)   · Resolved Date: 13 Feb 2015   · History of morbid obesity (V13.89) (Z86.39)   · Resolved Date: 27 Jul 2020   · History of rectal bleeding (V12.79) (Z87.19)   · Resolved Date: 28 Jul 2021   · History of Injury of toe, initial encounter (959.7) (S99.929A)   · Resolved Date: 28 Jul 2021   · History of Left foot pain (729.5) (M79.672)   · Resolved Date: 28 Jul 2021   · History of MSSA (methicillin susceptible Staphylococcus aureus) (041.11) (A49.01)   · Resolved Date: 28 Jul 2021   · History of Paronychia of toe (681.11) (L03.039)   · Resolved Date: 06 Oct 2021   · History of Pseudomonas infection (041.7) (A49.8)   · Resolved Date: 28 Jul 2021   · History of Sciatica of left side (724.3) (M54.32)   · Resolved Date: 28 Jul 2021   · Uncontrolled hypertension (401.9) (I10)   · Added by Problem List Migration; 2013-6-6    Current Meds    Medication Name Instruction   Accu-Chek Guide In Vitro Strip USE TO TEST SUGAR TWO TIMES A DAY   Accu-Chek Guide w/Device Kit check blood sugar two times  daily   dx: E11.9   Accu-Chek Softclix Lancets USE AS DIRECTED TO TEST TWICE DAILY.   Allopurinol 300 MG Oral Tablet Take 1 tablet daily   amLODIPine Besylate 10 MG Oral Tablet TAKE  1  TABLET Twice daily Please schedule a follow up appointment with Dr. Villaseñor for further refills at  [phone]   BD Pen Needle Karine U/F 32G X 4 MM use once daily   Blood Pressure Monitor Automat Device BP monitor for HTN   Blood Pressure Monitor Device USE AS DIRECTED   Bumetanide 2 MG Oral Tablet Take 1 tablet twice daily   Chlorthalidone 25 MG Oral Tablet TAKE 1 TABLET DAILY.   Colchicine 0.6 MG Oral Tablet TAKE  1  TABLET Daily PRN Gout flare   Farxiga 10 MG Oral Tablet TAKE ONE TABLET BY MOUTH EVERY MORNING   Gabapentin 100 MG Oral Capsule take 1 capsule by mouth three times a day   glyBURIDE 5 MG Oral Tablet TAKE TWO (2) TABLETS TWICE EVERY DAY BEFORE BREAKFAST AND SUPPER   hydrALAZINE HCl - 100 MG Oral Tablet TAKE  1  TABLET Twice daily Please schedule a follow up appointment with Dr. Villaseñor for further refills at [phone]   Insulin Degludec FlexTouch 100 UNIT/ML Subcutaneous Solution Pen-injector INJECT 20 UNIT Daily   Iron 325 (65 Fe) MG Oral Tablet TAKE 3 TABLET Daily   Isosorbide Dinitrate 10 MG Oral Tablet TAKE  1  TABLET Twice daily Please schedule a follow up appointment with Dr. Villaseñor for further refills at [phone]   Lisinopril 40 MG Oral Tablet TAKE  1  TABLET Twice daily Please schedule a follow up appointment with Dr. Villasñeor for further refills at [phone]   Metoprolol Succinate  MG Oral Tablet Extended Release 24 Hour Take 1 tablet daily   Multi Vitamin Oral Tablet TAKE 1 TABLET DAILY.   predniSONE 20 MG Oral Tablet TAKE 3 TABLETS DAILY FOR 3 DAYS, THEN 2 TABLETS DAILY FOR 3 DAYS, THEN 1 TABLET DAILY FOR 3 DAYS.   tiZANidine HCl - 2 MG Oral Tablet    Trulicity 1.5 MG/0.5ML Subcutaneous Solution Pen-injector USE ONE TRULICITY 1.5 MG  INJECTION PEN ON THE SAME DAY ONCE EACH WEEK .   Vitamin D3 50 MCG (2000 UT) Oral Capsule TAKE 1 CAPSULE Daily   V-R Vitamin C 1000 MG TABS TAKE 1 TABLET DAILY.     Allergies  NoKnown    · No Known Allergies  Updated By: Pat Walters; 7/4/2015 10:45:41 AM    Family History  Mother    · Family history of hypertension (V17.49) (Z82.49)  Father    ·  Family history of hypertension (V17.49) (Z82.49)  Maternal Relatives    · Family history of hypertension (V17.49) (Z82.49)   · Family history of Type 2 diabetes mellitus with chronic kidney disease, with long-term  current use of insulin, unspecified CKD stage  Paternal Relatives    · Family history of hypertension (V17.49) (Z82.49)   · Family history of Type 2 diabetes mellitus with chronic kidney disease, with long-term  current use of insulin, unspecified CKD stage  Other    · No pertinent family history    Social History  Problems    · Ex-cigarette smoker (V15.82) (Z87.891)   · Feels safe at home   · No illicit drug use   · Non-smoker (V49.89) (Z78.9)   · Occasional alcohol use    Review of Systems    Constitutional: as noted in HPI.   Cardiovascular: as noted in HPI.   Respiratory: as noted in HPI.   Neurological: as noted in HPI.      Vitals    Vitals:    03/04/25 1016   BP: 112/64   Pulse: (!) 117   Temp: 36.5 °C (97.7 °F)   SpO2: 98%        Physical Exam    Constitutional:  obese.   Eyes: no erythema, swelling or discharge from the eye .   Ears, Nose, Mouth, and Throat: external inspection of ears and nose is normal .   Neck: neck is supple, symmetric, trachea midline, no masses .   Pulmonary: lungs clear to auscultation.    Cardiovascular: regular rhythm, normal S1 and S2, no murmurs  and no edema .   Neurologic: non-focal neurologic examination.   Psychiatric oriented to person, place and time .      Results/Data  Latest creatinine was 1.85    A/P    Mr Caraballo is a 58 year old man with a PMH significant for HFpEF,CKD  with  hHTN, DM, and morbid obesity who has been losing weight with medical management , exercise and managing his food intake   He is here for a routine follow up visit   Has no CV complaints , his BP has been well controlled during the prior office visits with PCP .   Most recent echocardiogram from 2023 shows a normal EF of 55-60%    CV/HTN   Continue current medical regimen he is on   Will  see him back in follow up in one year   Congratulated him on committing to a healthier lifestyle and to losing weight

## 2025-03-04 NOTE — PATIENT INSTRUCTIONS
To reach Dr. Villaseñor's office please call [phone] ([place]). Fax [phone]. Call [phone] to schedule an appointment. You may also contact the HF RNs at [email or web address]    Thank you for coming to your appointment today. If you have any questions or need cardiac medication refills, please call the Heart Failure Office at [phone] option [phone].     Schedule an Echocardiogram at [phone] (before next appointment)  Please schedule a follow up appointment in one year

## 2025-03-05 LAB
ATRIAL RATE: 117 BPM
PR INTERVAL: 256 MS
Q ONSET: 219 MS
QRS COUNT: 19 BEATS
QRS DURATION: 86 MS
QT INTERVAL: 322 MS
QTC CALCULATION(BAZETT): 449 MS
QTC FREDERICIA: 402 MS
R AXIS: -12 DEGREES
T AXIS: 18 DEGREES
T OFFSET: 380 MS
VENTRICULAR RATE: 117 BPM

## 2025-03-06 ENCOUNTER — APPOINTMENT (OUTPATIENT)
Dept: PHARMACY | Facility: HOSPITAL | Age: 60
End: 2025-03-06
Payer: COMMERCIAL

## 2025-03-06 DIAGNOSIS — E11.9 DIABETES MELLITUS TYPE 2 WITHOUT RETINOPATHY (MULTI): Primary | ICD-10-CM

## 2025-03-06 NOTE — PROGRESS NOTES
Pharmacist Clinic: Diabetes Management  Shola Caraballo is a 59 y.o. male was referred to Clinical Pharmacy Team for diabetes management.     Referring Provider: Christopher D'Amico, DO     HISTORY OF PRESENT ILLNESS  Approximate Date of Diagnosis: 5 + years   Known complications due to diabetes included chronic kidney disease, obesity     Diet: historically had 2-3 meals per day and he did not limit what he ate  - since starting mounjaro patients appetite has decreased significantly   - starts his day with a protein shake and focuses on eating fruits and veggies throughout the day      LAB REVIEW   Glucose (mg/dL)   Date Value   12/16/2024 144 (H)   08/12/2024 73 (L)   03/11/2024 106 (H)     Hemoglobin A1C (%)   Date Value   12/16/2024 7.4 (H)   09/16/2024 7.3 (H)   03/11/2024 6.7 (H)     Bicarbonate (mmol/L)   Date Value   12/16/2024 31   08/12/2024 29   03/11/2024 29     Urea Nitrogen (mg/dL)   Date Value   12/16/2024 43 (H)   08/12/2024 46 (H)   03/11/2024 47 (H)     Creatinine (mg/dL)   Date Value   12/16/2024 1.66 (H)   08/12/2024 1.86 (H)   03/11/2024 1.70 (H)     Lab Results   Component Value Date    HGBA1C 7.4 (H) 12/16/2024    HGBA1C 7.3 (H) 09/16/2024    HGBA1C 6.7 (H) 03/11/2024     Lab Results   Component Value Date    CHOL 151 09/16/2024    CHOL 172 03/11/2024    CHOL 151 12/11/2023     Lab Results   Component Value Date    HDL 31.6 09/16/2024    HDL 35.6 03/11/2024    HDL 30.9 12/11/2023     Lab Results   Component Value Date    LDLCALC 89 09/16/2024    LDLCALC 111 (H) 03/11/2024    LDLCALC 96 12/11/2023     Lab Results   Component Value Date    TRIG 153 (H) 09/16/2024    TRIG 129 03/11/2024    TRIG 120 12/11/2023       DIABETES ASSESSMENT    CURRENT PHARMACOTHERAPY  - mounjaro 12.5 mg under the skin once weekly (Sundays)  - farxiga 10 mg once daily   - glyburide 5 mg once daily     SECONDARY PREVENTION  - Statin? No  - ACE-I/ARB? No    HISTORICAL PHARMACOTHERAPY  - metformin: discontinued due to  kidney function   - tresiba 20 units once daily: non-adherent (2 injections per week)  - aspart 20 units (patient thinks): non-adherent (maybe 1 injection per week)   - trulicity: transitioned to mounjaro for metabolic benefits     SMBG MEASUREMENTS:  range    DISCUSSION:   Diabetes:   A1c came back slightly elevated at 7.3% (goal under 7%)  Mounjaro:   Patient expresses frustration as he reports he is experiencing side effects from the medication today:   He reports having a decreased appetite, feeling full, and when he forces himself to eat he has diarrhea and sometimes vomits    Discussed this sensation is in part how people lose weight on the medication, however patient is not interested in weight loss at this time  Discussed altering diet to minimize these side effects:   Do not force yourself to eat   Eat foods that are light and easier to digest   Patient is not interested in making dietary adjustments  Given he is not interested in weight loss and making dietary adjustments, he wants to discontinue mounjaro 12.5 mg   Advised we could decrease the dose to 10 mg, he is not interested   Went over discontinuing the medication means his blood sugars will likely come back up  Inquired if patient tests his blood sugar so we can make plans to adjust medications when they come back up and patient hung up the phone     Employee Health Diabetes Program (VBID)  - Patient enrolled in  Employee diabetes program for $0 co-pays on diabetes medications/supplies  - Requested VB[employer] enrollment date: 1/16/25    RECOMMENDATIONS/PLAN  1. Patients diabetes is worsening with most recent A1c of 7.3 % (goal < 7 %).   - Continue all meds under the continuation of care with the referring provider and clinical pharmacy team.   - Patient self-discontinuing mounjaro, phone conversation ended before we could make adjustments.     Clinical Pharmacist follow up: as needed, patient can contact me at [phone]    Thank you,  Theresa  Marques Davis    Verbal consent to manage patient's drug therapy was obtained from the patient. They were informed they may decline to participate or withdraw from participation in pharmacy services at any time.

## 2025-03-07 ENCOUNTER — TELEPHONE (OUTPATIENT)
Dept: PHARMACY | Facility: HOSPITAL | Age: 60
End: 2025-03-07
Payer: COMMERCIAL

## 2025-03-07 DIAGNOSIS — E11.9 DIABETES MELLITUS TYPE 2 WITHOUT RETINOPATHY (MULTI): Primary | ICD-10-CM

## 2025-03-07 NOTE — TELEPHONE ENCOUNTER
Followed up with patient from virtual visit yesterday.   - During out visit, he expressed GI side effects (diarrhea, stomach discomfort), today he reports the diarrhea is mostly during the night and he has to remain on the toilet for extended periods of time.   - He cannot provide a specific timeline of onset of symptoms, but reports it has been for a while now.   - Expressed importance of providing this information to your doctors when they start.     Discussed his last mounjaro shot was 3/1, it will likely remain in his system for 7-10 days, then we will start to see a rise in his BG. We will follow up after his next PCP appointment on 3/13 to go over lab work and determine if symptoms have subsided 1 week without the medication in his system. From there, we will go over next steps on how we will plan to manage his BG.     Patient can call me at [phone].     Thanks,   Theresa Davis

## 2025-03-13 ENCOUNTER — APPOINTMENT (OUTPATIENT)
Dept: PRIMARY CARE | Facility: CLINIC | Age: 60
End: 2025-03-13
Payer: COMMERCIAL

## 2025-03-13 VITALS
OXYGEN SATURATION: 97 % | HEART RATE: 96 BPM | BODY MASS INDEX: 42.66 KG/M2 | DIASTOLIC BLOOD PRESSURE: 79 MMHG | SYSTOLIC BLOOD PRESSURE: 110 MMHG | WEIGHT: 315 LBS | HEIGHT: 72 IN

## 2025-03-13 DIAGNOSIS — Z79.4 TYPE 2 DIABETES MELLITUS WITHOUT COMPLICATION, WITH LONG-TERM CURRENT USE OF INSULIN (MULTI): ICD-10-CM

## 2025-03-13 DIAGNOSIS — I50.32 CHRONIC HEART FAILURE WITH PRESERVED EJECTION FRACTION: ICD-10-CM

## 2025-03-13 DIAGNOSIS — M10.9 GOUT, UNSPECIFIED CAUSE, UNSPECIFIED CHRONICITY, UNSPECIFIED SITE: ICD-10-CM

## 2025-03-13 DIAGNOSIS — N25.81 SECONDARY HYPERPARATHYROIDISM (MULTI): ICD-10-CM

## 2025-03-13 DIAGNOSIS — Z12.11 SCREENING FOR COLON CANCER: ICD-10-CM

## 2025-03-13 DIAGNOSIS — I50.9 CONGESTIVE HEART FAILURE, UNSPECIFIED HF CHRONICITY, UNSPECIFIED HEART FAILURE TYPE: ICD-10-CM

## 2025-03-13 DIAGNOSIS — K21.9 GASTROESOPHAGEAL REFLUX DISEASE, UNSPECIFIED WHETHER ESOPHAGITIS PRESENT: ICD-10-CM

## 2025-03-13 DIAGNOSIS — E78.5 HYPERLIPIDEMIA, UNSPECIFIED HYPERLIPIDEMIA TYPE: ICD-10-CM

## 2025-03-13 DIAGNOSIS — K52.9 CHRONIC DIARRHEA: ICD-10-CM

## 2025-03-13 DIAGNOSIS — D64.9 ANEMIA, UNSPECIFIED TYPE: ICD-10-CM

## 2025-03-13 DIAGNOSIS — I50.30 HEART FAILURE WITH PRESERVED EJECTION FRACTION, UNSPECIFIED HF CHRONICITY: ICD-10-CM

## 2025-03-13 DIAGNOSIS — E55.9 VITAMIN D DEFICIENCY: ICD-10-CM

## 2025-03-13 DIAGNOSIS — Z00.00 WELLNESS EXAMINATION: ICD-10-CM

## 2025-03-13 DIAGNOSIS — E11.9 TYPE 2 DIABETES MELLITUS WITHOUT COMPLICATION, WITH LONG-TERM CURRENT USE OF INSULIN (MULTI): ICD-10-CM

## 2025-03-13 DIAGNOSIS — N18.32 STAGE 3B CHRONIC KIDNEY DISEASE (MULTI): ICD-10-CM

## 2025-03-13 DIAGNOSIS — I10 HTN (HYPERTENSION), BENIGN: Primary | ICD-10-CM

## 2025-03-13 DIAGNOSIS — G47.33 OSA (OBSTRUCTIVE SLEEP APNEA): ICD-10-CM

## 2025-03-13 DIAGNOSIS — Z12.5 SCREENING FOR PROSTATE CANCER: ICD-10-CM

## 2025-03-13 PROCEDURE — 3008F BODY MASS INDEX DOCD: CPT | Performed by: STUDENT IN AN ORGANIZED HEALTH CARE EDUCATION/TRAINING PROGRAM

## 2025-03-13 PROCEDURE — 99396 PREV VISIT EST AGE 40-64: CPT | Performed by: STUDENT IN AN ORGANIZED HEALTH CARE EDUCATION/TRAINING PROGRAM

## 2025-03-13 PROCEDURE — 3074F SYST BP LT 130 MM HG: CPT | Performed by: STUDENT IN AN ORGANIZED HEALTH CARE EDUCATION/TRAINING PROGRAM

## 2025-03-13 PROCEDURE — RXMED WILLOW AMBULATORY MEDICATION CHARGE

## 2025-03-13 PROCEDURE — 4010F ACE/ARB THERAPY RXD/TAKEN: CPT | Performed by: STUDENT IN AN ORGANIZED HEALTH CARE EDUCATION/TRAINING PROGRAM

## 2025-03-13 PROCEDURE — 99214 OFFICE O/P EST MOD 30 MIN: CPT | Performed by: STUDENT IN AN ORGANIZED HEALTH CARE EDUCATION/TRAINING PROGRAM

## 2025-03-13 PROCEDURE — 3078F DIAST BP <80 MM HG: CPT | Performed by: STUDENT IN AN ORGANIZED HEALTH CARE EDUCATION/TRAINING PROGRAM

## 2025-03-13 PROCEDURE — 1036F TOBACCO NON-USER: CPT | Performed by: STUDENT IN AN ORGANIZED HEALTH CARE EDUCATION/TRAINING PROGRAM

## 2025-03-13 RX ORDER — COLCHICINE 0.6 MG/1
0.6 TABLET ORAL DAILY PRN
Qty: 10 TABLET | Refills: 1 | Status: SHIPPED | OUTPATIENT
Start: 2025-03-13

## 2025-03-13 RX ORDER — DAPAGLIFLOZIN 10 MG/1
10 TABLET, FILM COATED ORAL
Qty: 90 TABLET | Refills: 1 | Status: SHIPPED | OUTPATIENT
Start: 2025-03-13

## 2025-03-13 RX ORDER — ALLOPURINOL 300 MG/1
300 TABLET ORAL DAILY
Qty: 90 TABLET | Refills: 3 | Status: SHIPPED | OUTPATIENT
Start: 2025-03-13

## 2025-03-13 RX ORDER — PANTOPRAZOLE SODIUM 40 MG/1
40 TABLET, DELAYED RELEASE ORAL DAILY
Qty: 90 TABLET | Refills: 3 | Status: SHIPPED | OUTPATIENT
Start: 2025-03-13 | End: 2026-03-13

## 2025-03-13 ASSESSMENT — ENCOUNTER SYMPTOMS
OCCASIONAL FEELINGS OF UNSTEADINESS: 0
DEPRESSION: 0
LOSS OF SENSATION IN FEET: 0

## 2025-03-13 ASSESSMENT — PATIENT HEALTH QUESTIONNAIRE - PHQ9
SUM OF ALL RESPONSES TO PHQ9 QUESTIONS 1 AND 2: 0
1. LITTLE INTEREST OR PLEASURE IN DOING THINGS: NOT AT ALL
2. FEELING DOWN, DEPRESSED OR HOPELESS: NOT AT ALL

## 2025-03-13 NOTE — PROGRESS NOTES
59-year-old male presenting for follow-up on multiple concerns, CPE    HTN, CHF  Stable, tolerating current regimen well.  Following with cardiology.     DMII  Has discontinued Mounjaro over the last week.  Reports that he was getting significant abdominal pain, and chronic diarrhea.  Does not want to take similar medication ever again.     CKD, anemia  Stable, follows with nephrology.      Gout  Stable, tolerating current regimen well.     Vitamin D deficiency  Stable, tolerating current regimen well.     RODDY  Reports compliance with CPAP.  States that machine is probably 10 years old, likely last sleep study over 10 years ago.  Requesting new machine.     GERD, chronic diarrhea  Requesting to see GI.  GERD stable.    SocHx:   - Smoking: Quit over 20 years ago    12 point ROS reviewed and negative other than as stated in HPI      General: Alert, oriented, pleasant, in no acute distress  HEENT:      Head: normocephalic, atraumatic;      eyes: EOMI, no scleral icterus;   Neck: soft, supple, non-tender, no masses appreciated  CV: Heart with regular rate and rhythm, normal S1/S2, no murmurs  Lungs: CTAB without wheezing, rhonchi or rales; good respiratory effort, no increased work of breathing  Abdomen: soft, non-tender, non-distended, no masses appreciated, limited by body habitus  Extremities: no edema, wearing compression stockings, no cyanosis  Neuro: Cranial nerves grossly intact; alert and oriented  Psych: Appropriate mood and affect    #HM  -CBC, CMP, Lipid panel, Vit D, TSH with reflex T4, PSA  -Vaccines:       Flu: UTD      Shingrix: Reports never having chickenpox as a child, does not want varicella titers      Pneumococcal: Pneumovax 23 in 2021, will get Prevnar 20 at 66      Tdap: UTD 2018  -Lung cancer screening with low-dose CT: Quit over 20 years ago, does not qualify  -Colonoscopy: 2015, 10 year plan, ordered  -AAA screening: at 65    #HTN #CHF  - At goal in office  - Continue amlodipine 10 mg daily,  chlorthalidone 25 mg daily, hydralazine 25 mg twice daily, metoprolol succinate 100 mg daily, isosorbide dinitrate 10 mg twice daily  - Continue to follow with cardiology and nephrology  -Repeat CMP    #DMII  -Last A1c 7.4, 12/2024  -Continue Farxiga 10 mg daily, glipizide 5 mg BID  -Has discontinued Mounjaro due to GI side effects  -Follows with ophthalmology  - Repeat A1c    #HLD  -Recommended rosuvastatin in the past, deferred, wanting to work on lifestyle modification  - Repeat lipid panel     # CKD #Anemia  - Likely thalassemia versus anemia of chronic disease  - Continue to follow with nephrology     #Gout  - Continue allopurinol 300 mg daily, colchicine as needed, nephrotoxic warnings have been given     #Vitamin D deficiency  - Continue vitamin D supplements     #RODDY  - Continue with CPAP, reports compliance  -Will order new PAP machine, patient's machine at least 10 years old    #GERD  - Continue pantoprazole 40 mg daily     #Chronic diarrhea  -Continue daily fiber  -Potentially pancreatic insufficiency from long-term diabetes?  Should at least trial pancreatic enzymes with meals  - GI referral    #Hyperparathyroidism  -Following with nephrology    F/U 6 months, sooner if indicated, CPE at that time    Chris D'Amico, DO

## 2025-03-14 ENCOUNTER — PHARMACY VISIT (OUTPATIENT)
Dept: PHARMACY | Facility: CLINIC | Age: 60
End: 2025-03-14
Payer: COMMERCIAL

## 2025-03-14 ENCOUNTER — APPOINTMENT (OUTPATIENT)
Dept: PHARMACY | Facility: HOSPITAL | Age: 60
End: 2025-03-14
Payer: COMMERCIAL

## 2025-03-14 DIAGNOSIS — E11.9 DIABETES MELLITUS TYPE 2 WITHOUT RETINOPATHY (MULTI): Primary | ICD-10-CM

## 2025-03-14 NOTE — PROGRESS NOTES
Pharmacist Clinic: Diabetes Management  Shola Caraballo is a 59 y.o. male was referred to Clinical Pharmacy Team for diabetes management.     Referring Provider: Christopher D'Amico, DO     HISTORY OF PRESENT ILLNESS  Approximate Date of Diagnosis: 5 + years   Known complications due to diabetes included chronic kidney disease, obesity     Diet: historically had 2-3 meals per day and he did not limit what he ate  - since starting mounjaro patients appetite has decreased significantly   - starts his day with a protein shake and focuses on eating fruits and veggies throughout the day      LAB REVIEW   Glucose (mg/dL)   Date Value   12/16/2024 144 (H)   08/12/2024 73 (L)   03/11/2024 106 (H)     Hemoglobin A1C (%)   Date Value   12/16/2024 7.4 (H)   09/16/2024 7.3 (H)   03/11/2024 6.7 (H)     Bicarbonate (mmol/L)   Date Value   12/16/2024 31   08/12/2024 29   03/11/2024 29     Urea Nitrogen (mg/dL)   Date Value   12/16/2024 43 (H)   08/12/2024 46 (H)   03/11/2024 47 (H)     Creatinine (mg/dL)   Date Value   12/16/2024 1.66 (H)   08/12/2024 1.86 (H)   03/11/2024 1.70 (H)     Lab Results   Component Value Date    HGBA1C 7.4 (H) 12/16/2024    HGBA1C 7.3 (H) 09/16/2024    HGBA1C 6.7 (H) 03/11/2024     Lab Results   Component Value Date    CHOL 151 09/16/2024    CHOL 172 03/11/2024    CHOL 151 12/11/2023     Lab Results   Component Value Date    HDL 31.6 09/16/2024    HDL 35.6 03/11/2024    HDL 30.9 12/11/2023     Lab Results   Component Value Date    LDLCALC 89 09/16/2024    LDLCALC 111 (H) 03/11/2024    LDLCALC 96 12/11/2023     Lab Results   Component Value Date    TRIG 153 (H) 09/16/2024    TRIG 129 03/11/2024    TRIG 120 12/11/2023       DIABETES ASSESSMENT    CURRENT PHARMACOTHERAPY  - farxiga 10 mg once daily   - glyburide 5 mg once daily     SECONDARY PREVENTION  - Statin? No  - ACE-I/ARB? No    HISTORICAL PHARMACOTHERAPY  - metformin: discontinued due to kidney function   - tresiba 20 units once daily:  non-adherent (2 injections per week)  - aspart 20 units (patient thinks): non-adherent (maybe 1 injection per week)   - trulicity: transitioned to mounjaro for metabolic benefits   - mounjaro 12.5 mg under the skin once weekly (Sundays)    SMBG MEASUREMENTS:  range    DISCUSSION:   Diabetes:   A1c came back slightly elevated at 7.3% (goal under 7%)  Mounjaro: patients last dose was 3/1   He discontinued the medication due to GI upset, however he is still experiencing GI upset   Advised he follows up with a specialist as recommended at his last PCP visit   Went over discontinuing the medication means his blood sugars will likely come back up  Patient will follow up with endocrinology      Employee Health Diabetes Program (VBID)  - Patient enrolled in  Employee diabetes program for $0 co-pays on diabetes medications/supplies  - Requested VBID enrollment date: 1/16/25    RECOMMENDATIONS/PLAN  1. Patients diabetes is worsening with most recent A1c of 7.3 % (goal < 7 %).   - Continue all meds under the continuation of care with the referring provider and clinical pharmacy team.     Clinical Pharmacist follow up: as needed, patient can contact me at [phone]    Thank you,  Theresa Davis, PharmD    Verbal consent to manage patient's drug therapy was obtained from the patient. They were informed they may decline to participate or withdraw from participation in pharmacy services at any time.

## 2025-03-20 LAB
25(OH)D3+25(OH)D2 SERPL-MCNC: 42 NG/ML (ref 30–100)
ALBUMIN SERPL-MCNC: 4.4 G/DL (ref 3.6–5.1)
ALP SERPL-CCNC: 78 U/L (ref 35–144)
ALT SERPL-CCNC: 13 U/L (ref 9–46)
ANION GAP SERPL CALCULATED.4IONS-SCNC: 12 MMOL/L (CALC) (ref 7–17)
AST SERPL-CCNC: 21 U/L (ref 10–35)
BILIRUB SERPL-MCNC: 0.5 MG/DL (ref 0.2–1.2)
BUN SERPL-MCNC: 59 MG/DL (ref 7–25)
CALCIUM SERPL-MCNC: 9.5 MG/DL (ref 8.6–10.3)
CHLORIDE SERPL-SCNC: 101 MMOL/L (ref 98–110)
CHOLEST SERPL-MCNC: 133 MG/DL
CHOLEST/HDLC SERPL: 4.9 (CALC)
CO2 SERPL-SCNC: 26 MMOL/L (ref 20–32)
CREAT SERPL-MCNC: 1.97 MG/DL (ref 0.7–1.3)
EGFRCR SERPLBLD CKD-EPI 2021: 39 ML/MIN/1.73M2
ERYTHROCYTE [DISTWIDTH] IN BLOOD BY AUTOMATED COUNT: 19.4 % (ref 11–15)
EST. AVERAGE GLUCOSE BLD GHB EST-MCNC: 157 MG/DL
EST. AVERAGE GLUCOSE BLD GHB EST-SCNC: 8.7 MMOL/L
GLUCOSE SERPL-MCNC: 114 MG/DL (ref 65–99)
HBA1C MFR BLD: 7.1 % OF TOTAL HGB
HCT VFR BLD AUTO: 40.5 % (ref 38.5–50)
HDLC SERPL-MCNC: 27 MG/DL
HGB BLD-MCNC: 12.6 G/DL (ref 13.2–17.1)
LDLC SERPL CALC-MCNC: 70 MG/DL (CALC)
MCH RBC QN AUTO: 22.9 PG (ref 27–33)
MCHC RBC AUTO-ENTMCNC: 31.1 G/DL (ref 32–36)
MCV RBC AUTO: 73.6 FL (ref 80–100)
NONHDLC SERPL-MCNC: 106 MG/DL (CALC)
PLATELET # BLD AUTO: 308 THOUSAND/UL (ref 140–400)
PMV BLD REES-ECKER: 9.6 FL (ref 7.5–12.5)
POTASSIUM SERPL-SCNC: 3.7 MMOL/L (ref 3.5–5.3)
PROT SERPL-MCNC: 7.5 G/DL (ref 6.1–8.1)
PSA SERPL-MCNC: 1.79 NG/ML
RBC # BLD AUTO: 5.5 MILLION/UL (ref 4.2–5.8)
SODIUM SERPL-SCNC: 139 MMOL/L (ref 135–146)
TRIGL SERPL-MCNC: 298 MG/DL
TSH SERPL-ACNC: 2 MIU/L (ref 0.4–4.5)
URATE SERPL-MCNC: 8.7 MG/DL (ref 4–8)
WBC # BLD AUTO: 8.2 THOUSAND/UL (ref 3.8–10.8)

## 2025-03-26 ENCOUNTER — TELEPHONE (OUTPATIENT)
Dept: PRIMARY CARE | Facility: CLINIC | Age: 60
End: 2025-03-26
Payer: COMMERCIAL

## 2025-03-26 NOTE — TELEPHONE ENCOUNTER
----- Message from Christopher D'Amico sent at 3/21/2025  8:50 AM EDT -----  Borderline anemia, low MCV.  Stable, similar to past labs.  Can continue with iron supplementation.    Triglycerides moderately elevated at 298.,  Continue to work on improving diet, reduction in carbohydrates, Mediterranean diet.  Would still strongly consider low-dose rosuvastatin, perhaps 5 mg daily.    GFR 39, creatinine 1.97, BUN 59, slightly worsened from last labs, similar to a year ago.  Continue to follow with nephrology.    Hemoglobin A1c at 7.1, near goal, would like to see below 7, continue to work on dietary changes.    Uric acid at 8.7 which is above treatment goal of 6.  If doing well on allopurinol without gout flares, will not change treatment    Remaining labs unremarkable.

## 2025-03-26 NOTE — TELEPHONE ENCOUNTER
Result Communication    Resulted Orders   CBC   Result Value Ref Range    WHITE BLOOD CELL COUNT 8.2 3.8 - 10.8 Thousand/uL    RED BLOOD CELL COUNT 5.50 4.20 - 5.80 Million/uL    HEMOGLOBIN 12.6 (L) 13.2 - 17.1 g/dL    HEMATOCRIT 40.5 38.5 - 50.0 %    MCV 73.6 (L) 80.0 - 100.0 fL    MCH 22.9 (L) 27.0 - 33.0 pg    MCHC 31.1 (L) 32.0 - 36.0 g/dL      Comment:      For adults, a slight decrease in the calculated MCHC  value (in the range of 30 to 32 g/dL) is most likely  not clinically significant; however, it should be  interpreted with caution in correlation with other  red cell parameters and the patient's clinical  condition.      RDW 19.4 (H) 11.0 - 15.0 %    PLATELET COUNT 308 140 - 400 Thousand/uL    MPV 9.6 7.5 - 12.5 fL    Narrative    FASTING:YES    FASTING: YES   Lipid Panel   Result Value Ref Range    CHOLESTEROL, TOTAL 133 <200 mg/dL    HDL CHOLESTEROL 27 (L) > OR = 40 mg/dL    TRIGLYCERIDES 298 (H) <150 mg/dL      Comment:         If a non-fasting specimen was collected, consider  repeat triglyceride testing on a fasting specimen  if clinically indicated.   Juan et al. J. of Clin. Lipidol. 2015;9:129-169.         LDL-CHOLESTEROL 70 mg/dL (calc)      Comment:      Reference range: <100     Desirable range <100 mg/dL for primary prevention;    <70 mg/dL for patients with CHD or diabetic patients   with > or = 2 CHD risk factors.     LDL-C is now calculated using the Heladio-Evaristo   calculation, which is a validated novel method providing   better accuracy than the Friedewald equation in the   estimation of LDL-C.   Heladio QUINONEZ et al. MARK ANTHONY. 2013;310(19): 5399-6883   (http://education.[employer].com/faq/QHS411)      CHOL/HDLC RATIO 4.9 <5.0 (calc)    NON HDL CHOLESTEROL 106 <130 mg/dL (calc)      Comment:      For patients with diabetes plus 1 major ASCVD risk   factor, treating to a non-HDL-C goal of <100 mg/dL   (LDL-C of <70 mg/dL) is considered a therapeutic   option.      Narrative     FASTING:YES    FASTING: YES   Comprehensive Metabolic Panel   Result Value Ref Range    GLUCOSE 114 (H) 65 - 99 mg/dL      Comment:                    Fasting reference interval     For someone without known diabetes, a glucose value  between 100 and 125 mg/dL is consistent with  prediabetes and should be confirmed with a  follow-up test.         UREA NITROGEN (BUN) 59 (H) 7 - 25 mg/dL    CREATININE 1.97 (H) 0.70 - 1.30 mg/dL    EGFR 39 (L) > OR = 60 mL/min/1.73m2    SODIUM 139 135 - 146 mmol/L    POTASSIUM 3.7 3.5 - 5.3 mmol/L    CHLORIDE 101 98 - 110 mmol/L    CARBON DIOXIDE 26 20 - 32 mmol/L    ELECTROLYTE BALANCE 12 7 - 17 mmol/L (calc)    CALCIUM 9.5 8.6 - 10.3 mg/dL    PROTEIN, TOTAL 7.5 6.1 - 8.1 g/dL    ALBUMIN 4.4 3.6 - 5.1 g/dL    BILIRUBIN, TOTAL 0.5 0.2 - 1.2 mg/dL    ALKALINE PHOSPHATASE 78 35 - 144 U/L    AST 21 10 - 35 U/L    ALT 13 9 - 46 U/L    Narrative    FASTING:YES    FASTING: YES   TSH with reflex to Free T4 if abnormal   Result Value Ref Range    TSH W/REFLEX TO FT4 2.00 0.40 - 4.50 mIU/L    Narrative    FASTING:YES    FASTING: YES   Vitamin D 25-Hydroxy,Total (for eval of Vitamin D levels)   Result Value Ref Range    VITAMIN D,25-OH,TOTAL,IA 42 30 - 100 ng/mL      Comment:      Vitamin D Status         25-OH Vitamin D:     Deficiency:                    <20 ng/mL  Insufficiency:             20 - 29 ng/mL  Optimal:                 > or = 30 ng/mL     For 25-OH Vitamin D testing on patients on   D2-supplementation and patients for whom quantitation   of D2 and D3 fractions is required, the QuestAssureD(TM)  25-OH VIT D, (D2,D3), LC/MS/MS is recommended: order   code 71250 (patients >2yrs).     See Note 1     Note 1     For additional information, please refer to   http://education.Risk I/O/faq/OBY056   (This link is being provided for informational/  educational purposes only.)      Narrative    FASTING:YES    FASTING: YES   Prostate Specific Antigen   Result Value Ref Range    PSA,  TOTAL 1.79 < OR = 4.00 ng/mL      Comment:      The total PSA value from this assay system is   standardized against the WHO standard. The test   result will be approximately 20% lower when compared   to the equimolar-standardized total PSA (Quinton   Gino). Comparison of serial PSA results should be   interpreted with this fact in mind.     This test was performed using the Siemens   chemiluminescent method. Values obtained from   different assay methods cannot be used  interchangeably. PSA levels, regardless of  value, should not be interpreted as absolute  evidence of the presence or absence of disease.      Narrative    FASTING:YES    FASTING: YES   Hemoglobin A1C   Result Value Ref Range    HEMOGLOBIN A1c 7.1 (H) <5.7 % of total Hgb      Comment:      For someone without known diabetes, a hemoglobin A1c  value of 6.5% or greater indicates that they may have   diabetes and this should be confirmed with a follow-up   test.     For someone with known diabetes, a value <7% indicates   that their diabetes is well controlled and a value   greater than or equal to 7% indicates suboptimal   control. A1c targets should be individualized based on   duration of diabetes, age, comorbid conditions, and   other considerations.     Currently, no consensus exists regarding use of  hemoglobin A1c for diagnosis of diabetes for children.          eAG (mg/dL) 157 mg/dL    eAG (mmol/L) 8.7 mmol/L    Narrative    FASTING:YES    FASTING: YES   Uric acid   Result Value Ref Range    URIC ACID 8.7 (H) 4.0 - 8.0 mg/dL      Comment:      Therapeutic target for gout patients: <6.0 mg/dL          Narrative    FASTING:YES    FASTING: YES       4:53 PM      Results were successfully communicated with the patient and they acknowledged their understanding.

## 2025-03-31 PROCEDURE — RXMED WILLOW AMBULATORY MEDICATION CHARGE

## 2025-04-02 ENCOUNTER — PHARMACY VISIT (OUTPATIENT)
Dept: PHARMACY | Facility: CLINIC | Age: 60
End: 2025-04-02
Payer: COMMERCIAL

## 2025-04-03 ENCOUNTER — APPOINTMENT (OUTPATIENT)
Dept: ENDOCRINOLOGY | Facility: CLINIC | Age: 60
End: 2025-04-03
Payer: COMMERCIAL

## 2025-04-03 VITALS
DIASTOLIC BLOOD PRESSURE: 76 MMHG | WEIGHT: 315 LBS | SYSTOLIC BLOOD PRESSURE: 124 MMHG | HEIGHT: 72 IN | HEART RATE: 76 BPM | RESPIRATION RATE: 16 BRPM | BODY MASS INDEX: 42.66 KG/M2

## 2025-04-03 DIAGNOSIS — E11.65 INADEQUATELY CONTROLLED DIABETES MELLITUS (MULTI): Primary | ICD-10-CM

## 2025-04-03 DIAGNOSIS — E78.5 HYPERLIPIDEMIA, UNSPECIFIED HYPERLIPIDEMIA TYPE: ICD-10-CM

## 2025-04-03 DIAGNOSIS — I10 HYPERTENSION, UNSPECIFIED TYPE: ICD-10-CM

## 2025-04-03 PROCEDURE — 3008F BODY MASS INDEX DOCD: CPT | Performed by: INTERNAL MEDICINE

## 2025-04-03 PROCEDURE — 3078F DIAST BP <80 MM HG: CPT | Performed by: INTERNAL MEDICINE

## 2025-04-03 PROCEDURE — 4010F ACE/ARB THERAPY RXD/TAKEN: CPT | Performed by: INTERNAL MEDICINE

## 2025-04-03 PROCEDURE — 99214 OFFICE O/P EST MOD 30 MIN: CPT | Performed by: INTERNAL MEDICINE

## 2025-04-03 PROCEDURE — 3074F SYST BP LT 130 MM HG: CPT | Performed by: INTERNAL MEDICINE

## 2025-04-03 PROCEDURE — 1036F TOBACCO NON-USER: CPT | Performed by: INTERNAL MEDICINE

## 2025-04-03 ASSESSMENT — ENCOUNTER SYMPTOMS
SHORTNESS OF BREATH: 0
FEVER: 0
DIZZINESS: 0
NAUSEA: 0
CHILLS: 0
VOMITING: 0
DIARRHEA: 0
LIGHT-HEADEDNESS: 0

## 2025-04-03 NOTE — PROGRESS NOTES
Endocrinology: Follow up visit  Subjective   Patient ID: Shola Caraballo is a 59 y.o. male who presents for Diabetes (Type 2 ).    PCP: Christopher D'Amico, DO    HPI  Dm2  Last seen a year ago  At that time on tresiba 20 units and meal insulin 10/10 as well as trulicity 1.5 and farxiga  Seen by pcp and pharmacy team.  Switched to mounjaro and taken off insulin (spotty compliance) but reports still taking meal insulin sometimes also on glyburide/farxiga and added mounjaro  Intolerant to increased doses of mounjaro x2 now off completely x1 month.    Reports sugars are ok in mid to low 100s but occ lows    Review of Systems   Constitutional:  Negative for chills and fever.   Respiratory:  Negative for shortness of breath.    Gastrointestinal:  Negative for diarrhea, nausea and vomiting.   Endocrine: Negative for cold intolerance and heat intolerance.   Neurological:  Negative for dizziness and light-headedness.       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 without macular edema associated with type 2 diabetes mellitus   • Elevated parathyroid hormone   • Foot osteomyelitis, left (Multi)   • Gout   • Hyperlipidemia   • Incisional hernia   • Left-sided low back pain with left-sided sciatica   • Low HDL (under 40)   • Low vitamin D level   • Nasal congestion with rhinorrhea   • Combined forms of age-related cataract of both eyes   • Obstructive sleep apnea   • Onychomycosis of toenail   • Prerenal azotemia   • Pressure ulcer of dorsum of left foot   • Refraction error   • Sickle cell trait (CMS-[facility])   • Ulcer of right leg (Multi)   • Uncontrolled hypertension   • History of morbid obesity   • HTN (hypertension), benign   • Peripheral vascular disease (CMS-[facility])   • BMI 40.0-44.9, adult (Multi)   • Cellulitis of left lower limb   • Type 2 diabetes  mellitus with other specified complication   • Encounter for adjustment and management of vascular access device   • Hyperlipidemia, unspecified   • Low back pain   • Morbid obesity due to excess calories (Multi)   • Muscle weakness (generalized)   • Chronic ulcer of heel, left, with unspecified severity (Multi)   • Obstructive sleep apnea (adult) (pediatric)   • Osteomyelitis   • Other chronic pain   • Personal history of nicotine dependence   • Primary localized osteoarthrosis of pelvic region   • Sick   • Streptococcus infection, group B   • Acute osteomyelitis of left calcaneus (Multi)   • COVID-19   • Stage 3 chronic kidney disease (Multi)   • Abdominal pain   • Diarrhea   • Nausea   • Partial intestinal obstruction (Multi)   • Small bowel obstruction (Multi)   • Congestive heart failure   • Essential hypertension, benign   • Sleep apnea   • Anemia   • Contact with and (suspected) exposure to covid-19   • Hematuria   • History of heart failure   • Rectal hemorrhage   • Premature atrial contraction   • Vitamin D deficiency   • Conjunctival pigmentation, left   • Secondary hyperparathyroidism (Multi)        Home Meds:  Current Outpatient Medications   Medication Instructions   • Accu-Chek Guide Glucose Meter misc    • alcohol swabs pads, medicated USE AS DIRECTED FOUR TIMES A DAY WHEN CHECKING BLOOD GLUCOSE   • allopurinol (ZYLOPRIM) 300 mg, oral, Daily   • amLODIPine (NORVASC) 2.5 mg, oral, Daily   • ascorbic acid (VITAMIN C) 500 mg, Daily   • blood sugar diagnostic (Accu-Chek Guide test strips) strip Use to test blood sugar twice daily.   • bumetanide (BUMEX) 2 mg, oral, Daily   • calcitriol (ROCALTROL) 0.25 mcg, oral, 3 times weekly   • chlorthalidone (Hygroton) 25 mg tablet Take 1 tablet by mouth daily   • colchicine 0.6 mg tablet Take 1 tablet (0.6 mg) by mouth once daily as needed for gout flare.   • dapagliflozin propanediol (Farxiga) 10 mg TAKE 1 TABLET BY MOUTH EVERY MORNING   • ferrous sulfate 325 (65  "Fe) MG tablet 3 tablet DAILY (route: oral)   • glyBURIDE (Diabeta) 5 mg tablet TAKE 2 TABLETS BY MOUTH TWO TIMES A DAY BEFORE BREAKFAST AND SUPPER. NEED TO MAKE APPOINTMENT WITH DOCTOR.   • hydrALAZINE (APRESOLINE) 25 mg, oral, 2 times daily   • isosorbide dinitrate (ISORDIL) 10 mg, oral, Daily   • lancets misc USE TO TEST BLOOD SUGAR TWICE DAILY   • lisinopril 40 mg tablet TAKE 1 TABLET BY MOUTH ONCE DAILY   • metoprolol succinate XL (Toprol-XL) 100 mg 24 hr tablet TAKE 1 TABLET BY MOUTH ONCE DAILY   • multivitamin with minerals (DAILY VITAMIN FORMULA-MINERALS) tablet 1 tablet DAILY (route: oral)   • pantoprazole (PROTONIX) 40 mg, oral, Daily        No Known Allergies     Objective   Vitals:    04/03/25 1152   BP: 124/76   Pulse: 76   Resp: 16      Vitals:    04/03/25 1152   Weight: 148 kg (326 lb)      Body mass index is 44.21 kg/m².   Physical Exam  Constitutional:       Appearance: Normal appearance. He is overweight.   HENT:      Head: Normocephalic and atraumatic.   Neck:      Thyroid: No thyroid mass, thyromegaly or thyroid tenderness.   Cardiovascular:      Rate and Rhythm: Normal rate and regular rhythm.      Heart sounds: No murmur heard.     No gallop.   Pulmonary:      Effort: Pulmonary effort is normal.      Breath sounds: Normal breath sounds.   Abdominal:      Palpations: Abdomen is soft.      Comments: benign   Neurological:      General: No focal deficit present.      Mental Status: He is alert and oriented to person, place, and time.      Deep Tendon Reflexes: Reflexes are normal and symmetric.   Psychiatric:         Behavior: Behavior is cooperative.       Labs:  Lab Results   Component Value Date    HGBA1C 7.1 (H) 03/19/2025    TSH 2.00 03/19/2025      No results found for: \"PR1\", \"THYROIDPAB\", \"TSI\"     Assessment/Plan   Assessment & Plan  Inadequately controlled diabetes mellitus (Multi)    Discussed course: will stop insulin completely  Reduce glyburide to one twice daily  Start back on " trulicity  Hypertension, unspecified type    Bp excellent  Hyperlipidemia, unspecified hyperlipidemia type    Refuses statin      Electronically signed by:  Leatha Carlos MD 04/03/25 11:53 AM

## 2025-04-03 NOTE — PATIENT INSTRUCTIONS
Start trulicity 0.75 mg weekly  Reduce glyburide to one tablet twice daily  Stop insulin completely  Continue farxiga  Follow up in 4 months

## 2025-04-07 PROCEDURE — RXMED WILLOW AMBULATORY MEDICATION CHARGE

## 2025-04-08 ENCOUNTER — CLINICAL SUPPORT (OUTPATIENT)
Dept: EMERGENCY MEDICINE | Facility: HOSPITAL | Age: 60
End: 2025-04-08
Payer: COMMERCIAL

## 2025-04-08 ENCOUNTER — APPOINTMENT (OUTPATIENT)
Dept: RADIOLOGY | Facility: HOSPITAL | Age: 60
End: 2025-04-08
Payer: COMMERCIAL

## 2025-04-08 ENCOUNTER — HOSPITAL ENCOUNTER (INPATIENT)
Facility: HOSPITAL | Age: 60
LOS: 1 days | Discharge: HOME | End: 2025-04-10
Attending: STUDENT IN AN ORGANIZED HEALTH CARE EDUCATION/TRAINING PROGRAM | Admitting: STUDENT IN AN ORGANIZED HEALTH CARE EDUCATION/TRAINING PROGRAM
Payer: COMMERCIAL

## 2025-04-08 DIAGNOSIS — N17.9 AKI (ACUTE KIDNEY INJURY): ICD-10-CM

## 2025-04-08 DIAGNOSIS — I50.31 ACUTE HEART FAILURE WITH PRESERVED EJECTION FRACTION: ICD-10-CM

## 2025-04-08 DIAGNOSIS — G47.33 OBSTRUCTIVE SLEEP APNEA: ICD-10-CM

## 2025-04-08 DIAGNOSIS — G47.30 SLEEP APNEA, UNSPECIFIED TYPE: ICD-10-CM

## 2025-04-08 DIAGNOSIS — E87.6 HYPOKALEMIA: ICD-10-CM

## 2025-04-08 DIAGNOSIS — R07.9 CHEST PAIN, UNSPECIFIED TYPE: ICD-10-CM

## 2025-04-08 DIAGNOSIS — R06.09 DOE (DYSPNEA ON EXERTION): Primary | ICD-10-CM

## 2025-04-08 LAB
ALBUMIN SERPL BCP-MCNC: 4.3 G/DL (ref 3.4–5)
ALP SERPL-CCNC: 86 U/L (ref 33–120)
ALT SERPL W P-5'-P-CCNC: 17 U/L (ref 10–52)
ANION GAP SERPL CALC-SCNC: 16 MMOL/L (ref 10–20)
AST SERPL W P-5'-P-CCNC: 25 U/L (ref 9–39)
BASOPHILS # BLD AUTO: 0.05 X10*3/UL (ref 0–0.1)
BASOPHILS NFR BLD AUTO: 0.5 %
BILIRUB SERPL-MCNC: 0.4 MG/DL (ref 0–1.2)
BUN SERPL-MCNC: 47 MG/DL (ref 6–23)
CALCIUM SERPL-MCNC: 9.7 MG/DL (ref 8.6–10.6)
CARDIAC TROPONIN I PNL SERPL HS: 15 NG/L (ref 0–53)
CARDIAC TROPONIN I PNL SERPL HS: 31 NG/L (ref 0–53)
CHLORIDE SERPL-SCNC: 97 MMOL/L (ref 98–107)
CO2 SERPL-SCNC: 32 MMOL/L (ref 21–32)
CREAT SERPL-MCNC: 1.91 MG/DL (ref 0.5–1.3)
D DIMER PPP FEU-MCNC: 660 NG/ML FEU
EGFRCR SERPLBLD CKD-EPI 2021: 40 ML/MIN/1.73M*2
EOSINOPHIL # BLD AUTO: 0.32 X10*3/UL (ref 0–0.7)
EOSINOPHIL NFR BLD AUTO: 3.3 %
ERYTHROCYTE [DISTWIDTH] IN BLOOD BY AUTOMATED COUNT: 19 % (ref 11.5–14.5)
GLUCOSE SERPL-MCNC: 169 MG/DL (ref 74–99)
HCT VFR BLD AUTO: 38.4 % (ref 41–52)
HGB BLD-MCNC: 12.9 G/DL (ref 13.5–17.5)
IMM GRANULOCYTES # BLD AUTO: 0.05 X10*3/UL (ref 0–0.7)
IMM GRANULOCYTES NFR BLD AUTO: 0.5 % (ref 0–0.9)
LYMPHOCYTES # BLD AUTO: 2.55 X10*3/UL (ref 1.2–4.8)
LYMPHOCYTES NFR BLD AUTO: 26.2 %
MCH RBC QN AUTO: 23.3 PG (ref 26–34)
MCHC RBC AUTO-ENTMCNC: 33.6 G/DL (ref 32–36)
MCV RBC AUTO: 69 FL (ref 80–100)
MONOCYTES # BLD AUTO: 0.97 X10*3/UL (ref 0.1–1)
MONOCYTES NFR BLD AUTO: 10 %
NEUTROPHILS # BLD AUTO: 5.79 X10*3/UL (ref 1.2–7.7)
NEUTROPHILS NFR BLD AUTO: 59.5 %
NRBC BLD-RTO: 0 /100 WBCS (ref 0–0)
PLATELET # BLD AUTO: 258 X10*3/UL (ref 150–450)
POTASSIUM SERPL-SCNC: 3.4 MMOL/L (ref 3.5–5.3)
PROT SERPL-MCNC: 8 G/DL (ref 6.4–8.2)
RBC # BLD AUTO: 5.54 X10*6/UL (ref 4.5–5.9)
SODIUM SERPL-SCNC: 142 MMOL/L (ref 136–145)
WBC # BLD AUTO: 9.7 X10*3/UL (ref 4.4–11.3)

## 2025-04-08 PROCEDURE — 84484 ASSAY OF TROPONIN QUANT: CPT | Performed by: STUDENT IN AN ORGANIZED HEALTH CARE EDUCATION/TRAINING PROGRAM

## 2025-04-08 PROCEDURE — 84484 ASSAY OF TROPONIN QUANT: CPT

## 2025-04-08 PROCEDURE — 85379 FIBRIN DEGRADATION QUANT: CPT

## 2025-04-08 PROCEDURE — 83880 ASSAY OF NATRIURETIC PEPTIDE: CPT

## 2025-04-08 PROCEDURE — 36415 COLL VENOUS BLD VENIPUNCTURE: CPT

## 2025-04-08 PROCEDURE — 99285 EMERGENCY DEPT VISIT HI MDM: CPT | Mod: 25 | Performed by: STUDENT IN AN ORGANIZED HEALTH CARE EDUCATION/TRAINING PROGRAM

## 2025-04-08 PROCEDURE — 93005 ELECTROCARDIOGRAM TRACING: CPT

## 2025-04-08 PROCEDURE — 85025 COMPLETE CBC W/AUTO DIFF WBC: CPT | Performed by: STUDENT IN AN ORGANIZED HEALTH CARE EDUCATION/TRAINING PROGRAM

## 2025-04-08 PROCEDURE — 80053 COMPREHEN METABOLIC PANEL: CPT | Performed by: STUDENT IN AN ORGANIZED HEALTH CARE EDUCATION/TRAINING PROGRAM

## 2025-04-08 PROCEDURE — 93010 ELECTROCARDIOGRAM REPORT: CPT | Performed by: STUDENT IN AN ORGANIZED HEALTH CARE EDUCATION/TRAINING PROGRAM

## 2025-04-08 PROCEDURE — 82805 BLOOD GASES W/O2 SATURATION: CPT | Performed by: STUDENT IN AN ORGANIZED HEALTH CARE EDUCATION/TRAINING PROGRAM

## 2025-04-08 PROCEDURE — 36415 COLL VENOUS BLD VENIPUNCTURE: CPT | Performed by: STUDENT IN AN ORGANIZED HEALTH CARE EDUCATION/TRAINING PROGRAM

## 2025-04-08 PROCEDURE — 99285 EMERGENCY DEPT VISIT HI MDM: CPT | Performed by: STUDENT IN AN ORGANIZED HEALTH CARE EDUCATION/TRAINING PROGRAM

## 2025-04-08 PROCEDURE — 71046 X-RAY EXAM CHEST 2 VIEWS: CPT

## 2025-04-08 PROCEDURE — 2500000001 HC RX 250 WO HCPCS SELF ADMINISTERED DRUGS (ALT 637 FOR MEDICARE OP)

## 2025-04-08 PROCEDURE — RXMED WILLOW AMBULATORY MEDICATION CHARGE

## 2025-04-08 RX ORDER — POTASSIUM CHLORIDE 1.5 G/1.58G
40 POWDER, FOR SOLUTION ORAL ONCE
Status: COMPLETED | OUTPATIENT
Start: 2025-04-08 | End: 2025-04-08

## 2025-04-08 RX ADMIN — POTASSIUM CHLORIDE 40 MEQ: 1.5 POWDER, FOR SOLUTION ORAL at 22:00

## 2025-04-08 ASSESSMENT — COLUMBIA-SUICIDE SEVERITY RATING SCALE - C-SSRS
1. IN THE PAST MONTH, HAVE YOU WISHED YOU WERE DEAD OR WISHED YOU COULD GO TO SLEEP AND NOT WAKE UP?: NO
6. HAVE YOU EVER DONE ANYTHING, STARTED TO DO ANYTHING, OR PREPARED TO DO ANYTHING TO END YOUR LIFE?: NO
2. HAVE YOU ACTUALLY HAD ANY THOUGHTS OF KILLING YOURSELF?: NO

## 2025-04-08 ASSESSMENT — HEART SCORE
TROPONIN: LESS THAN OR EQUAL TO NORMAL LIMIT
ECG: NON-SPECIFIC REPOLARIZATION DISTURBANCE
AGE: 45-64
RISK FACTORS: >2 RISK FACTORS OR HX OF ATHEROSCLEROTIC DISEASE
HISTORY: HIGHLY SUSPICIOUS
HEART SCORE: 6

## 2025-04-08 ASSESSMENT — PAIN SCALES - GENERAL: PAINLEVEL_OUTOF10: 8

## 2025-04-08 ASSESSMENT — PAIN - FUNCTIONAL ASSESSMENT: PAIN_FUNCTIONAL_ASSESSMENT: 0-10

## 2025-04-08 NOTE — ED TRIAGE NOTES
C/O SOB x2 days. Worse with exertion. Pt stated also noticed pain in right arm and right Jaw starting today. Pt is A/O x4, ambulatory.

## 2025-04-09 ENCOUNTER — PHARMACY VISIT (OUTPATIENT)
Dept: PHARMACY | Facility: CLINIC | Age: 60
End: 2025-04-09
Payer: COMMERCIAL

## 2025-04-09 ENCOUNTER — APPOINTMENT (OUTPATIENT)
Dept: CARDIOLOGY | Facility: HOSPITAL | Age: 60
End: 2025-04-09
Payer: COMMERCIAL

## 2025-04-09 ENCOUNTER — APPOINTMENT (OUTPATIENT)
Dept: RADIOLOGY | Facility: HOSPITAL | Age: 60
End: 2025-04-09
Payer: COMMERCIAL

## 2025-04-09 PROBLEM — R07.9 CHEST PAIN, UNSPECIFIED TYPE: Status: ACTIVE | Noted: 2025-04-09

## 2025-04-09 LAB
AORTIC VALVE PEAK VELOCITY: 1.17 M/S
AV PEAK GRADIENT: 5 MMHG
AVA (PEAK VEL): 2.86 CM2
BASE EXCESS BLDV CALC-SCNC: 8.2 MMOL/L (ref -2–3)
BNP SERPL-MCNC: 39 PG/ML (ref 0–99)
BODY TEMPERATURE: ABNORMAL
CARDIAC TROPONIN I PNL SERPL HS: 18 NG/L (ref 0–53)
EJECTION FRACTION APICAL 4 CHAMBER: 57.5
EJECTION FRACTION: 55 %
GLUCOSE BLD MANUAL STRIP-MCNC: 208 MG/DL (ref 74–99)
GLUCOSE BLD MANUAL STRIP-MCNC: 242 MG/DL (ref 74–99)
GLUCOSE BLD MANUAL STRIP-MCNC: 251 MG/DL (ref 74–99)
GLUCOSE BLD MANUAL STRIP-MCNC: 313 MG/DL (ref 74–99)
HCO3 BLDV-SCNC: 35.1 MMOL/L (ref 22–26)
INHALED O2 CONCENTRATION: 21 %
LEFT VENTRICLE INTERNAL DIMENSION DIASTOLE: 5.34 CM (ref 3.5–6)
LEFT VENTRICULAR OUTFLOW TRACT DIAMETER: 2.22 CM
OXYHGB MFR BLDV: 49.1 % (ref 45–75)
PCO2 BLDV: 58 MM HG (ref 41–51)
PH BLDV: 7.39 PH (ref 7.33–7.43)
PO2 BLDV: 33 MM HG (ref 35–45)
RIGHT VENTRICLE FREE WALL PEAK S': 6 CM/S
RIGHT VENTRICLE PEAK SYSTOLIC PRESSURE: 44.9 MMHG
SAO2 % BLDV: 49 % (ref 45–75)
TRICUSPID ANNULAR PLANE SYSTOLIC EXCURSION: 1.3 CM

## 2025-04-09 PROCEDURE — 96360 HYDRATION IV INFUSION INIT: CPT | Mod: 59

## 2025-04-09 PROCEDURE — 99222 1ST HOSP IP/OBS MODERATE 55: CPT

## 2025-04-09 PROCEDURE — 71275 CT ANGIOGRAPHY CHEST: CPT | Performed by: STUDENT IN AN ORGANIZED HEALTH CARE EDUCATION/TRAINING PROGRAM

## 2025-04-09 PROCEDURE — 2500000002 HC RX 250 W HCPCS SELF ADMINISTERED DRUGS (ALT 637 FOR MEDICARE OP, ALT 636 FOR OP/ED): Performed by: STUDENT IN AN ORGANIZED HEALTH CARE EDUCATION/TRAINING PROGRAM

## 2025-04-09 PROCEDURE — 2500000002 HC RX 250 W HCPCS SELF ADMINISTERED DRUGS (ALT 637 FOR MEDICARE OP, ALT 636 FOR OP/ED)

## 2025-04-09 PROCEDURE — 2500000004 HC RX 250 GENERAL PHARMACY W/ HCPCS (ALT 636 FOR OP/ED)

## 2025-04-09 PROCEDURE — G0378 HOSPITAL OBSERVATION PER HR: HCPCS

## 2025-04-09 PROCEDURE — 93306 TTE W/DOPPLER COMPLETE: CPT | Performed by: INTERNAL MEDICINE

## 2025-04-09 PROCEDURE — 2500000001 HC RX 250 WO HCPCS SELF ADMINISTERED DRUGS (ALT 637 FOR MEDICARE OP): Performed by: STUDENT IN AN ORGANIZED HEALTH CARE EDUCATION/TRAINING PROGRAM

## 2025-04-09 PROCEDURE — C8929 TTE W OR WO FOL WCON,DOPPLER: HCPCS

## 2025-04-09 PROCEDURE — 71275 CT ANGIOGRAPHY CHEST: CPT

## 2025-04-09 PROCEDURE — 2550000001 HC RX 255 CONTRASTS: Performed by: STUDENT IN AN ORGANIZED HEALTH CARE EDUCATION/TRAINING PROGRAM

## 2025-04-09 PROCEDURE — 2500000001 HC RX 250 WO HCPCS SELF ADMINISTERED DRUGS (ALT 637 FOR MEDICARE OP)

## 2025-04-09 PROCEDURE — 82947 ASSAY GLUCOSE BLOOD QUANT: CPT

## 2025-04-09 PROCEDURE — 96372 THER/PROPH/DIAG INJ SC/IM: CPT

## 2025-04-09 PROCEDURE — 94640 AIRWAY INHALATION TREATMENT: CPT

## 2025-04-09 RX ORDER — AMLODIPINE BESYLATE 5 MG/1
5 TABLET ORAL DAILY
Status: DISCONTINUED | OUTPATIENT
Start: 2025-04-09 | End: 2025-04-10 | Stop reason: HOSPADM

## 2025-04-09 RX ORDER — DEXTROSE 50 % IN WATER (D50W) INTRAVENOUS SYRINGE
25
Status: DISCONTINUED | OUTPATIENT
Start: 2025-04-09 | End: 2025-04-10 | Stop reason: HOSPADM

## 2025-04-09 RX ORDER — AMLODIPINE BESYLATE 5 MG/1
2.5 TABLET ORAL DAILY
Status: DISCONTINUED | OUTPATIENT
Start: 2025-04-09 | End: 2025-04-09

## 2025-04-09 RX ORDER — METOPROLOL SUCCINATE 50 MG/1
100 TABLET, EXTENDED RELEASE ORAL DAILY
Status: DISCONTINUED | OUTPATIENT
Start: 2025-04-09 | End: 2025-04-10 | Stop reason: HOSPADM

## 2025-04-09 RX ORDER — CHLORTHALIDONE 25 MG/1
25 TABLET ORAL DAILY
Status: CANCELLED | OUTPATIENT
Start: 2025-04-09

## 2025-04-09 RX ORDER — POLYETHYLENE GLYCOL 3350 17 G/17G
17 POWDER, FOR SOLUTION ORAL DAILY
Status: DISCONTINUED | OUTPATIENT
Start: 2025-04-09 | End: 2025-04-10 | Stop reason: HOSPADM

## 2025-04-09 RX ORDER — HYDRALAZINE HYDROCHLORIDE 25 MG/1
25 TABLET, FILM COATED ORAL 2 TIMES DAILY
Status: CANCELLED | OUTPATIENT
Start: 2025-04-09

## 2025-04-09 RX ORDER — IPRATROPIUM BROMIDE AND ALBUTEROL SULFATE 2.5; .5 MG/3ML; MG/3ML
3 SOLUTION RESPIRATORY (INHALATION) ONCE
Status: COMPLETED | OUTPATIENT
Start: 2025-04-09 | End: 2025-04-09

## 2025-04-09 RX ORDER — ENOXAPARIN SODIUM 100 MG/ML
40 INJECTION SUBCUTANEOUS EVERY 12 HOURS SCHEDULED
Status: DISCONTINUED | OUTPATIENT
Start: 2025-04-09 | End: 2025-04-10 | Stop reason: HOSPADM

## 2025-04-09 RX ORDER — DEXTROSE 50 % IN WATER (D50W) INTRAVENOUS SYRINGE
12.5
Status: DISCONTINUED | OUTPATIENT
Start: 2025-04-09 | End: 2025-04-10 | Stop reason: HOSPADM

## 2025-04-09 RX ORDER — ISOSORBIDE DINITRATE 20 MG/1
10 TABLET ORAL DAILY
Status: CANCELLED | OUTPATIENT
Start: 2025-04-09

## 2025-04-09 RX ORDER — PANTOPRAZOLE SODIUM 40 MG/1
40 TABLET, DELAYED RELEASE ORAL DAILY
Status: DISCONTINUED | OUTPATIENT
Start: 2025-04-09 | End: 2025-04-10 | Stop reason: HOSPADM

## 2025-04-09 RX ORDER — DAPAGLIFLOZIN 10 MG/1
10 TABLET, FILM COATED ORAL
Status: DISCONTINUED | OUTPATIENT
Start: 2025-04-09 | End: 2025-04-10 | Stop reason: HOSPADM

## 2025-04-09 RX ORDER — ASCORBIC ACID 500 MG
500 TABLET ORAL DAILY
Status: DISCONTINUED | OUTPATIENT
Start: 2025-04-09 | End: 2025-04-10 | Stop reason: HOSPADM

## 2025-04-09 RX ORDER — CALCITRIOL 0.25 UG/1
0.25 CAPSULE ORAL 3 TIMES WEEKLY
Status: DISCONTINUED | OUTPATIENT
Start: 2025-04-09 | End: 2025-04-10 | Stop reason: HOSPADM

## 2025-04-09 RX ORDER — INSULIN LISPRO 100 [IU]/ML
0-10 INJECTION, SOLUTION INTRAVENOUS; SUBCUTANEOUS
Status: DISCONTINUED | OUTPATIENT
Start: 2025-04-09 | End: 2025-04-10 | Stop reason: HOSPADM

## 2025-04-09 RX ORDER — ALLOPURINOL 100 MG/1
300 TABLET ORAL DAILY
Status: DISCONTINUED | OUTPATIENT
Start: 2025-04-09 | End: 2025-04-10 | Stop reason: HOSPADM

## 2025-04-09 RX ORDER — LISINOPRIL 40 MG/1
40 TABLET ORAL DAILY
Status: DISCONTINUED | OUTPATIENT
Start: 2025-04-09 | End: 2025-04-10 | Stop reason: HOSPADM

## 2025-04-09 RX ORDER — AMLODIPINE BESYLATE 5 MG/1
2.5 TABLET ORAL DAILY
Status: CANCELLED | OUTPATIENT
Start: 2025-04-09

## 2025-04-09 RX ORDER — CHLORTHALIDONE 25 MG/1
25 TABLET ORAL DAILY
Status: DISCONTINUED | OUTPATIENT
Start: 2025-04-09 | End: 2025-04-10 | Stop reason: HOSPADM

## 2025-04-09 RX ADMIN — ALLOPURINOL 300 MG: 100 TABLET ORAL at 10:20

## 2025-04-09 RX ADMIN — LISINOPRIL 40 MG: 40 TABLET ORAL at 10:20

## 2025-04-09 RX ADMIN — PERFLUTREN 4 ML OF DILUTION: 6.52 INJECTION, SUSPENSION INTRAVENOUS at 14:12

## 2025-04-09 RX ADMIN — INSULIN LISPRO 4 UNITS: 100 INJECTION, SOLUTION INTRAVENOUS; SUBCUTANEOUS at 13:17

## 2025-04-09 RX ADMIN — PANTOPRAZOLE SODIUM 40 MG: 40 TABLET, DELAYED RELEASE ORAL at 10:20

## 2025-04-09 RX ADMIN — ENOXAPARIN SODIUM 40 MG: 100 INJECTION SUBCUTANEOUS at 10:21

## 2025-04-09 RX ADMIN — AMLODIPINE BESYLATE 5 MG: 5 TABLET ORAL at 20:33

## 2025-04-09 RX ADMIN — ENOXAPARIN SODIUM 40 MG: 100 INJECTION SUBCUTANEOUS at 20:33

## 2025-04-09 RX ADMIN — SODIUM CHLORIDE 1000 ML: 9 INJECTION, SOLUTION INTRAVENOUS at 03:28

## 2025-04-09 RX ADMIN — OXYCODONE HYDROCHLORIDE AND ACETAMINOPHEN 500 MG: 500 TABLET ORAL at 10:20

## 2025-04-09 RX ADMIN — DAPAGLIFLOZIN 10 MG: 10 TABLET, FILM COATED ORAL at 10:20

## 2025-04-09 RX ADMIN — IPRATROPIUM BROMIDE AND ALBUTEROL SULFATE 3 ML: .5; 3 SOLUTION RESPIRATORY (INHALATION) at 02:21

## 2025-04-09 RX ADMIN — IOHEXOL 80 ML: 350 INJECTION, SOLUTION INTRAVENOUS at 00:58

## 2025-04-09 RX ADMIN — METOPROLOL SUCCINATE 100 MG: 50 TABLET, EXTENDED RELEASE ORAL at 10:20

## 2025-04-09 RX ADMIN — INSULIN LISPRO 4 UNITS: 100 INJECTION, SOLUTION INTRAVENOUS; SUBCUTANEOUS at 18:14

## 2025-04-09 RX ADMIN — INSULIN LISPRO 8 UNITS: 100 INJECTION, SOLUTION INTRAVENOUS; SUBCUTANEOUS at 10:21

## 2025-04-09 SDOH — SOCIAL STABILITY: SOCIAL INSECURITY: WITHIN THE LAST YEAR, HAVE YOU BEEN AFRAID OF YOUR PARTNER OR EX-PARTNER?: NO

## 2025-04-09 SDOH — ECONOMIC STABILITY: FOOD INSECURITY: WITHIN THE PAST 12 MONTHS, YOU WORRIED THAT YOUR FOOD WOULD RUN OUT BEFORE YOU GOT THE MONEY TO BUY MORE.: NEVER TRUE

## 2025-04-09 SDOH — SOCIAL STABILITY: SOCIAL INSECURITY: ARE THERE ANY APPARENT SIGNS OF INJURIES/BEHAVIORS THAT COULD BE RELATED TO ABUSE/NEGLECT?: NO

## 2025-04-09 SDOH — SOCIAL STABILITY: SOCIAL INSECURITY
WITHIN THE LAST YEAR, HAVE YOU BEEN KICKED, HIT, SLAPPED, OR OTHERWISE PHYSICALLY HURT BY YOUR PARTNER OR EX-PARTNER?: NO

## 2025-04-09 SDOH — ECONOMIC STABILITY: FOOD INSECURITY: WITHIN THE PAST 12 MONTHS, THE FOOD YOU BOUGHT JUST DIDN'T LAST AND YOU DIDN'T HAVE MONEY TO GET MORE.: NEVER TRUE

## 2025-04-09 SDOH — SOCIAL STABILITY: SOCIAL INSECURITY: WITHIN THE LAST YEAR, HAVE YOU BEEN HUMILIATED OR EMOTIONALLY ABUSED IN OTHER WAYS BY YOUR PARTNER OR EX-PARTNER?: NO

## 2025-04-09 SDOH — SOCIAL STABILITY: SOCIAL INSECURITY
WITHIN THE LAST YEAR, HAVE YOU BEEN RAPED OR FORCED TO HAVE ANY KIND OF SEXUAL ACTIVITY BY YOUR PARTNER OR EX-PARTNER?: NO

## 2025-04-09 SDOH — SOCIAL STABILITY: SOCIAL INSECURITY: ARE YOU OR HAVE YOU BEEN THREATENED OR ABUSED PHYSICALLY, EMOTIONALLY, OR SEXUALLY BY ANYONE?: NO

## 2025-04-09 SDOH — SOCIAL STABILITY: SOCIAL INSECURITY: WERE YOU ABLE TO COMPLETE ALL THE BEHAVIORAL HEALTH SCREENINGS?: YES

## 2025-04-09 SDOH — SOCIAL STABILITY: SOCIAL INSECURITY: HAVE YOU HAD ANY THOUGHTS OF HARMING ANYONE ELSE?: NO

## 2025-04-09 SDOH — SOCIAL STABILITY: SOCIAL INSECURITY: DO YOU FEEL UNSAFE GOING BACK TO THE PLACE WHERE YOU ARE LIVING?: NO

## 2025-04-09 SDOH — ECONOMIC STABILITY: INCOME INSECURITY: IN THE PAST 12 MONTHS HAS THE ELECTRIC, GAS, OIL, OR WATER COMPANY THREATENED TO SHUT OFF SERVICES IN YOUR HOME?: NO

## 2025-04-09 SDOH — SOCIAL STABILITY: SOCIAL INSECURITY: HAVE YOU HAD THOUGHTS OF HARMING ANYONE ELSE?: NO

## 2025-04-09 SDOH — SOCIAL STABILITY: SOCIAL INSECURITY: DOES ANYONE TRY TO KEEP YOU FROM HAVING/CONTACTING OTHER FRIENDS OR DOING THINGS OUTSIDE YOUR HOME?: NO

## 2025-04-09 SDOH — SOCIAL STABILITY: SOCIAL INSECURITY: HAS ANYONE EVER THREATENED TO HURT YOUR FAMILY OR YOUR PETS?: NO

## 2025-04-09 SDOH — SOCIAL STABILITY: SOCIAL INSECURITY: DO YOU FEEL ANYONE HAS EXPLOITED OR TAKEN ADVANTAGE OF YOU FINANCIALLY OR OF YOUR PERSONAL PROPERTY?: NO

## 2025-04-09 SDOH — SOCIAL STABILITY: SOCIAL INSECURITY: ABUSE: ADULT

## 2025-04-09 ASSESSMENT — COGNITIVE AND FUNCTIONAL STATUS - GENERAL
MOBILITY SCORE: 24
PATIENT BASELINE BEDBOUND: NO
MOBILITY SCORE: 24
DAILY ACTIVITIY SCORE: 24
DAILY ACTIVITIY SCORE: 24

## 2025-04-09 ASSESSMENT — ACTIVITIES OF DAILY LIVING (ADL)
BATHING: INDEPENDENT
LACK_OF_TRANSPORTATION: NO
ASSISTIVE_DEVICE: EYEGLASSES
TOILETING: INDEPENDENT
FEEDING YOURSELF: INDEPENDENT
HEARING - LEFT EAR: FUNCTIONAL
GROOMING: INDEPENDENT
JUDGMENT_ADEQUATE_SAFELY_COMPLETE_DAILY_ACTIVITIES: YES
WALKS IN HOME: INDEPENDENT
HEARING - RIGHT EAR: FUNCTIONAL
DRESSING YOURSELF: INDEPENDENT
ADEQUATE_TO_COMPLETE_ADL: YES
LACK_OF_TRANSPORTATION: NO
PATIENT'S MEMORY ADEQUATE TO SAFELY COMPLETE DAILY ACTIVITIES?: YES

## 2025-04-09 ASSESSMENT — PATIENT HEALTH QUESTIONNAIRE - PHQ9
2. FEELING DOWN, DEPRESSED OR HOPELESS: NOT AT ALL
SUM OF ALL RESPONSES TO PHQ9 QUESTIONS 1 & 2: 0
1. LITTLE INTEREST OR PLEASURE IN DOING THINGS: NOT AT ALL

## 2025-04-09 ASSESSMENT — LIFESTYLE VARIABLES
HOW OFTEN DO YOU HAVE A DRINK CONTAINING ALCOHOL: MONTHLY OR LESS
AUDIT-C TOTAL SCORE: 2
AUDIT-C TOTAL SCORE: 2
SKIP TO QUESTIONS 9-10: 0
HOW MANY STANDARD DRINKS CONTAINING ALCOHOL DO YOU HAVE ON A TYPICAL DAY: 1 OR 2
HOW OFTEN DO YOU HAVE 6 OR MORE DRINKS ON ONE OCCASION: LESS THAN MONTHLY

## 2025-04-09 ASSESSMENT — PAIN - FUNCTIONAL ASSESSMENT
PAIN_FUNCTIONAL_ASSESSMENT: 0-10
PAIN_FUNCTIONAL_ASSESSMENT: 0-10

## 2025-04-09 ASSESSMENT — PAIN SCALES - GENERAL
PAINLEVEL_OUTOF10: 7
PAINLEVEL_OUTOF10: 0 - NO PAIN

## 2025-04-09 NOTE — PROGRESS NOTES
Emergency Department Transition of Care Note       Signout   I received Shola Caraballo in signout from Dr. Celis.  Please see the ED Provider Note for all HPI, PE and MDM up to the time of signout at 2300.  This is in addition to the primary record.    In brief Shola Caraballo is an 59 y.o. male presenting for dyspnea on exertion and chest pain.    At the time of signout we were awaiting:  CT PE, improvement in tachycardia, reevaluation and admission.    ED Course & Medical Decision Making   Medical Decision Making:  Under my care, CT PE was negative. Still quiet dyspneic with exertion, chest pain improved, troponin down trending, however the patient is still having some dyspnea on exertion this is fairly new for him.  Unsure of the etiology and for that reason the patient was admitted for further evaluation and workup.    ED Course:  ED Course as of 04/09/25 0523   Tue Apr 08, 2025   2047 Troponin I, High Sensitivity (CMC): 15 [AW]   2208 ECG 12 lead  EKG demonstrates irregular rhythm with an average rate of 103.  There are P waves noted although not consistently which may represent sinus with PACs versus a flutter versus A-fib.  There are otherwise no ST elevations depressions or T wave inversions concerning for ischemia [AW]   Wed Apr 09, 2025   0054 Troponin I, High Sensitivity (CMC): 18 [KD]   0138 CT angio chest for pulmonary embolism [KD]   0139 CT angio chest for pulmonary embolism  IMPRESSION:  1. No evidence of acute pulmonary embolism or acute cardiopulmonary  abnormality.  2. Dilated main pulmonary artery measuring 3.5 cm, correlate with  concern for pulmonary arterial hypertension.  3. Subtle mosaic attenuation of the lungs favoring air trapping or  small airway disease.  4. Severe coronary artery calcifications. Please note the study is  not optimized for evaluation of the coronary arteries. [KD]      ED Course User Index  [AW] Darshana Celis DO  [KD] Leonie Lang DO         Diagnoses  "as of 04/09/25 0523   ALMEIDA (dyspnea on exertion)   Chest pain, unspecified type   Hypokalemia       Disposition   As a result of their workup, the patient will require admission to the hospital.  The patient was informed of his diagnosis.  The patient was given the opportunity to ask questions and I answered them. The patient agreed to be admitted to the hospital.    Patient seen and discussed with ED attending.    Leonie Lang DO, \"Ed\", PGY-2  Emergency Medicine   "

## 2025-04-09 NOTE — PROGRESS NOTES
Pharmacy Medication History Review    Shola Caraballo is a 59 y.o. male admitted for Chest pain, unspecified type. Pharmacy reviewed the patient's fghds-be-vzjjfdmap medications and allergies for accuracy.    Medications ADDED:  OTC Digestive Enzyme  Medications CHANGED:  Calcitriol   Glyburide  Medications REMOVED/NOT TAKING:   Blood Glucose Meter  Alcohol Swabs  Ferrous sulfate     The list below reflects the updated PTA list.   Prior to Admission Medications   Prescriptions Last Dose Informant   allopurinol (Zyloprim) 300 mg tablet  Self   Sig: Take 1 tablet (300 mg) by mouth once daily.   amLODIPine (Norvasc) 2.5 mg tablet  Self   Sig: Take 1 tablet (2.5 mg) by mouth once daily.   ascorbic acid (Vitamin C) 1,000 mg tablet  Self   Sig: Take 0.5 tablets (500 mg) by mouth once daily.   blood sugar diagnostic (Accu-Chek Guide test strips) strip  Self   Sig: Use to test blood sugar twice daily.   bumetanide (Bumex) 2 mg tablet  Self   Sig: Take 1 tablet (2 mg) by mouth once daily.   calcitriol (Rocaltrol) 0.25 mcg capsule  Self   Sig: Take 1 capsule (0.25 mcg) by mouth 3 times a week.   Patient taking differently: Take 1 capsule (0.25 mcg) by mouth 3 times a week. Monday, Wednesday and Friday   chlorthalidone (Hygroton) 25 mg tablet  Self   Sig: Take 1 tablet by mouth daily   colchicine 0.6 mg tablet  Self   Sig: Take 1 tablet (0.6 mg) by mouth once daily as needed for gout flare.   dapagliflozin propanediol (Farxiga) 10 mg  Self   Sig: TAKE 1 TABLET BY MOUTH EVERY MORNING   dulaglutide (Trulicity) 0.75 mg/0.5 mL pen injector  Self   Sig: Inject 0.75 mg under the skin 1 (one) time per week.  Not yet started    enzymes,digestive (DIGESTIVE ENZYMES ORAL)  Self   Sig: Take 1 capsule by mouth once daily as needed (stomach upset).   glyBURIDE (Diabeta) 5 mg tablet     Sig: TAKE 2 TABLETS BY MOUTH TWO TIMES A DAY BEFORE BREAKFAST AND SUPPER. NEED TO MAKE APPOINTMENT WITH DOCTOR.   Patient taking differently: Take 1  "tablet (5 mg) by mouth once daily with breakfast.   hydrALAZINE (Apresoline) 25 mg tablet  Self   Sig: Take 1 tablet (25 mg) by mouth 2 times a day.   Patient taking differently: Take 1 tablet (25 mg) by mouth once daily.   isosorbide dinitrate (Isordil) 10 mg tablet  Self   Sig: Take 1 tablet (10 mg) by mouth once daily.   lancets misc  Self   Sig: USE TO TEST BLOOD SUGAR TWICE DAILY   lisinopril 40 mg tablet  Self   Sig: TAKE 1 TABLET BY MOUTH ONCE DAILY   metoprolol succinate XL (Toprol-XL) 100 mg 24 hr tablet  Self   Sig: TAKE 1 TABLET BY MOUTH ONCE DAILY   multivitamin with minerals (DAILY VITAMIN FORMULA-MINERALS) tablet  Self   Si tablet DAILY (route: oral)   pantoprazole (ProtoNix) 40 mg EC tablet Not Taking Self   Sig: Take 1 tablet (40 mg) by mouth once daily.   Patient not taking: Reported on 2025      Facility-Administered Medications: None        The list below reflects the updated allergy list. Please review each documented allergy for additional clarification and justification.  Allergies  Reviewed by Manda Shepherd RN on 2025   No Known Allergies         Patient accepts M2B at discharge.     Sources:   Patient interview (moderate historian, knew the names, strength and directions for most medications)   [facility]  Care Everywhere  Chart Review   Endocrinology Progress Note 4/3/25  Medication Dispense History    Additional Comments:  Patient reported taking Insulin as recently as 2 days ago, did not know which type of insulin he has been using. No dispensing information found for any insulin in the past year.  Endocrinology Progress Note 4/3/25 states to completely stop insulin.  Patient stopped taking Pantoprazole due to nausea, vomiting and acid reflux while taking the medication.    Beulah Calderon, [facility]  Transitions of Care Pharmacist  25     Secure Chat preferred   If no response call [phone] or Franchisee Gladiator \"Med Rec\"    "

## 2025-04-09 NOTE — PROGRESS NOTES
04/09/25 0950   Discharge Planning   Living Arrangements Spouse/significant other   Support Systems Spouse/significant other   Assistance Needed Patient independent with ADL's   Type of Residence Private residence   Number of Stairs to Enter Residence 5   Number of Stairs Within Residence 0   Do you have animals or pets at home? Yes   Type of Animals or Pets 3 dogs   Who is requesting discharge planning? Provider   Home or Post Acute Services None   Expected Discharge Disposition Home   Does the patient need discharge transport arranged? No   Financial Resource Strain   How hard is it for you to pay for the very basics like food, housing, medical care, and heating? Not hard   Housing Stability   In the last 12 months, was there a time when you were not able to pay the mortgage or rent on time? N   At any time in the past 12 months, were you homeless or living in a shelter (including now)? N   Transportation Needs   In the past 12 months, has lack of transportation kept you from medical appointments or from getting medications? no   In the past 12 months, has lack of transportation kept you from meetings, work, or from getting things needed for daily living? No   Patient Choice   Patient / Family choosing to utilize agency / facility established prior to hospitalization No     Assessment Note:  Met with patient and Introduced myself as care coordinator and member of the Care Transitions team for discharge planning. Patient lives in a single family home with spouse he is independent with ADL's.  Patient demographics and contact information verified. Pt feels safe at home. Patient have no further questions or concerns at this time.    Transportation: Patient drive to [place] car in parking garage  Pharmacy:  mentor  DME: None  Previous home care: None  Falls: No recent falls  PCP: Raul Hope do last seen a month ago  Dialysis: Denies  Diabetic: Yes, waiting on prior auth for medication    TCC will continue to  follow and update the plan as warranted.    ADOLFO HarperN-RN  Transitional Care Coordinator (TCC)  [phone] ext [phone]

## 2025-04-09 NOTE — ED PROVIDER NOTES
Emergency Department Provider Note        History of Present Illness     History provided by: Patient  Limitations to History: None  External Records Reviewed with Brief Summary:  ECHO 23 CONCLUSIONS: LVEF 55-60%    HPI:  Shola Caraballo is a 59 y.o. male medical history of hypertension, hyperlipidemia, diabetes, CKD, gout, RODDY presenting with a chief complaint of fatigue and chest pain.  Patient states for the last 3 to 4 days she started having midsternal chest pain radiating down the right arm and up the right neck.  Patient denies any neck pain.  He states while working out he notices specifically and feels like he is more fatigued than usual with his baseline activity.  Patient denies any increasing lower extremity edema, states he has been compliant with his Bumex 2 mg.  Patient states he has never had chest pain like this before.  Patient endorses a mild nonproductive cough but no fevers, chills, congestion or known sick contacts.  Currently patient is chest pain-free.  Patient denies history of DVT, PE, recent travel, hospitalization, hormonal drug use.    Physical Exam   Triage vitals:  T 36.3 °C (97.3 °F)  HR (!) 106  BP (!) 161/94  RR 16  O2 98 % None (Room air)    General: Awake, alert, in no acute distress  Eyes: Gaze conjugate.  No scleral icterus or injection  HENT: Normo-cephalic, atraumatic. No stridor  CV: Regular rate, regular rhythm. Radial pulses 2+ bilaterally  Resp: Breathing non-labored, speaking in full sentences.  Clear to auscultation bilaterally  GI: Soft, non-distended, non-tender. No rebound or guarding.  MSK/Extremities: No gross bony deformities. Moving all extremities  Skin: Warm. Appropriate color  Neuro: Alert. Oriented. Face symmetric. Speech is fluent.  Gross strength and sensation intact in b/l UE and LEs  Psych: Appropriate mood and affect    Medical Decision Making & ED Course   Medical Decision Makin y.o. male multiple cardiac risk factors presenting with 3  to 4 days of midsternal radiating chest pain down the arm along with shortness of breath on exertion.  On arrival patient is mildly tachycardic with a rate of 106, hypertensive 161/94, afebrile saturating well on room air and in no acute distress.  Patient is wearing compression stockings throughout exam so difficult to tell if he is having increasing lower extremity edema however states that this is his baseline.  Lungs are clear to auscultation without crackles or rhonchi.  There is no wheezing on exam.  Labs obtained by provider in triage demonstrated normal troponin, mild increase in creatinine with hypokalemia which patient was ordered.  Chest x-ray was obtained to evaluate for possible pneumonia, pneumothorax, effusion which was also negative.  Given his overall negative workup, tachycardia and exertional shortness of breath a VTE D-dimer was ordered.  Troponin did uptrend from 15-30.  D-dimer elevated at 660.  Patient was notably more tachycardic to 118 therefore a CT PE was ordered.  Patient was signed out to the oncoming provider pending CT scan and third troponin, anticipate admission for cardiac workup.  Please see their note for final impression and disposition  ----  Scoring Tools Utilized: HEART Score: 6       Differential diagnoses considered include but are not limited to: Pneumonia, acute coronary syndrome, pulmonary embolism, aortic dissection/rupture, costochondritis, pneumothorax       Social Determinants of Health which Significantly Impact Care: None identified     EKG Independent Interpretation: EKG interpreted by myself. Please see ED Course for full interpretation.    Independent Result Review and Interpretation: Relevant laboratory and radiographic results were reviewed and independently interpreted by myself.  As necessary, they are commented on in the ED Course.    Chronic conditions affecting the patient's care: As documented above in MDM    The patient was discussed with the following  consultants/services: None    Care Considerations: As documented above in [facility]    ED Course:  ED Course as of 04/09/25 1552   Tue Apr 08, 2025   2047 Troponin I, High Sensitivity (CMC): 15 [AW]   2208 ECG 12 lead  EKG demonstrates irregular rhythm with an average rate of 103.  There are P waves noted although not consistently which may represent sinus with PACs versus a flutter versus A-fib.  There are otherwise no ST elevations depressions or T wave inversions concerning for ischemia [AW]   Wed Apr 09, 2025   0054 Troponin I, High Sensitivity (CMC): 18 [KD]   0138 CT angio chest for pulmonary embolism [KD]   0139 CT angio chest for pulmonary embolism  IMPRESSION:  1. No evidence of acute pulmonary embolism or acute cardiopulmonary  abnormality.  2. Dilated main pulmonary artery measuring 3.5 cm, correlate with  concern for pulmonary arterial hypertension.  3. Subtle mosaic attenuation of the lungs favoring air trapping or  small airway disease.  4. Severe coronary artery calcifications. Please note the study is  not optimized for evaluation of the coronary arteries. [KD]      ED Course User Index  [AW] Darshana Celis DO  [KD] Leonie Lang DO         Diagnoses as of 04/09/25 1552   ALMEIDA (dyspnea on exertion)   Chest pain, unspecified type   Hypokalemia     Disposition   Patient was signed out to Dr. Lang at 2300 pending completion of their work-up.  Please see the next provider's transition of care note for the remainder of the patient's care.     Procedures   Procedures    Patient seen and discussed with ED attending physician.    Darshana Celis DO  Emergency Medicine       Darshana Celis DO  Resident  04/09/25 9183

## 2025-04-09 NOTE — H&P
HPI:  Shola Caraballo is a 59 y.o. male with a hx fof HTN, HFpEF, DM, CKD, RODDY on CPAP presenting with dyspnea on exertion and admitted for further workup.     Patient states that yesterday he noted increased dyspnea on exertion while ambulating stairs.  He also noted increased dyspnea after using his punching bag for couple minutes that he is normally able to use without issue.  Additionally, he noted right arm and jaw pain while he was driving his car that lasted a couple minutes and resolved spontaneously.  He denies any preceding injury to the area denies any pain now.  He denies any history of chest pain or any prior episodes of increased dyspnea on exertion, even when he was hospitalized for a heart failure exacerbation during initial HFpEF diagnosis.     He otherwise denies any recent illness, cough, sick contacts, fevers/chills, abdominal pain, weight gain or loss, increased/decreased urinary frequency, orthopnea, PND, or palpitations.  He states his blood pressure has been running in the 100 systolic at home and he still has dizziness occasionally after working out and with standing. He has been gradually decreasing his hypertensive regimen as he has lost weight.  He is compliant with his CPAP.    ED course  Vitals:   /87 (BP Location: Right arm, Patient Position: Lying)   Pulse (!) 109   Temp 36.3 °C (97.3 °F) (Temporal)   Resp 18   Ht 1.829 m (6')   Wt 145 kg (320 lb)   SpO2 97%   BMI 43.40 kg/m²    - Labs:   CBC: WBC 9.7 Hgb 12.9  plt 258   BMP: Na 142, K 3.4 Cl 97 HCO3 32 BUN 47 Cr 1.91glu 169   LFT: Ca 9.7 tprot 8.0, alb 4.3 alkphos 86 AST25 ALT 17 tbili 0.4 dbili _  Troponin 15,31,18  - Imaging:   Imaging  CT angio chest for pulmonary embolism    Result Date: 4/9/2025  1. No evidence of acute pulmonary embolism or acute cardiopulmonary abnormality. 2. Mildly enlarged main pulmonary artery measuring 3.2 cm, correlate with concern for pulmonary arterial hypertension. 3. Moderate coronary  artery atherosclerotic calcifications. Hepatomegaly and hepatic steatosis.     I personally reviewed the image(s)/study and resident interpretation. I agree with the findings as stated by resident Khang Hamm. Data analyzed and images interpreted at [place], [place].   MACRO: None   Signed by: Mahin Hedrick 4/9/2025 2:16 AM Dictation workstation:   [email or web address]    XR chest 2 views    Result Date: 4/8/2025  1.  No evidence of acute cardiopulmonary process.   I personally reviewed the image(s)/study and resident interpretation. I agree with the findings as stated by resident Khang Hamm. Data analyzed and images interpreted at [place], [place].   MACRO: None   Signed by: Mahin Hedrick 4/8/2025 10:15 PM Dictation workstation:   [id number]     - Interventions Duoneb, Kcl 40, 1L NS     Past medical history:  As above      Surgical history:  Past Surgical History:   Procedure Laterality Date    OTHER SURGICAL HISTORY  07/28/2021    Ventral hernia repair    OTHER SURGICAL HISTORY  07/28/2021    Appendectomy    OTHER SURGICAL HISTORY  07/27/2020    Hip replacement       Medications prior to admission:  Current Outpatient Medications   Medication Instructions    Accu-Chek Guide Glucose Meter misc     alcohol swabs pads, medicated USE AS DIRECTED FOUR TIMES A DAY WHEN CHECKING BLOOD GLUCOSE    allopurinol (ZYLOPRIM) 300 mg, oral, Daily    amLODIPine (NORVASC) 2.5 mg, oral, Daily    ascorbic acid (VITAMIN C) 500 mg, Daily    blood sugar diagnostic (Accu-Chek Guide test strips) strip Use to test blood sugar twice daily.    bumetanide (BUMEX) 2 mg, oral, Daily    calcitriol (ROCALTROL) 0.25 mcg, oral, 3 times weekly    chlorthalidone (Hygroton) 25 mg tablet Take 1 tablet by mouth daily    colchicine 0.6 mg tablet Take 1 tablet (0.6 mg) by mouth once daily as needed for gout flare.    dapagliflozin propanediol (Farxiga) 10 mg  TAKE 1 TABLET BY MOUTH EVERY MORNING    dulaglutide (TRULICITY) 0.75 mg, subcutaneous, Once Weekly    ferrous sulfate 325 (65 Fe) MG tablet 3 tablet DAILY (route: oral)    glyBURIDE (Diabeta) 5 mg tablet TAKE 2 TABLETS BY MOUTH TWO TIMES A DAY BEFORE BREAKFAST AND SUPPER. NEED TO MAKE APPOINTMENT WITH DOCTOR.    hydrALAZINE (APRESOLINE) 25 mg, oral, 2 times daily    isosorbide dinitrate (ISORDIL) 10 mg, oral, Daily    lancets misc USE TO TEST BLOOD SUGAR TWICE DAILY    lisinopril 40 mg tablet TAKE 1 TABLET BY MOUTH ONCE DAILY    metoprolol succinate XL (Toprol-XL) 100 mg 24 hr tablet TAKE 1 TABLET BY MOUTH ONCE DAILY    multivitamin with minerals (DAILY VITAMIN FORMULA-MINERALS) tablet 1 tablet DAILY (route: oral)    pantoprazole (PROTONIX) 40 mg, oral, Daily       Allergies:  No Known Allergies    Family history:  Family History   Problem Relation Name Age of Onset    Hypertension Mother      Hypertension Father      Hypertension Other maternal relatives     Diabetes type II Other maternal relatives     Hypertension Other paternal relatives     Diabetes type II Other paternal relatives      Social history:   reports that he has quit smoking. His smoking use included cigarettes. He has been exposed to tobacco smoke. He has never used smokeless tobacco. He reports that he does not currently use alcohol. He reports that he does not currently use drugs.  Denies illicits, quit smoking >17 years ago, social EtOH.     Review of systems:  12 point ROS otherwise negative      Vitals:    24 Hour Vitals  Temperature:  [36.3 °C (97.3 °F)] 36.3 °C (97.3 °F)  Heart Rate:  [106-118] 109  Respirations:  [16-18] 18  BP: (128-161)/(83-94) 128/87    Temp (24hrs), Av.3 °C (97.3 °F), Min:36.3 °C (97.3 °F), Max:36.3 °C (97.3 °F)     24 hour Intake/Output    Intake/Output Summary (Last 24 hours) at 2025 0538  Last data filed at 2025 1741  Gross per 24 hour   Intake 1000 ml   Output --   Net 1000 ml        Physical  exam:  Constitutional: Well-developed male in no acute distress.  HEENT: NCAT. EOMI.   Respiratory: CTAB. No wheezes, crackles, or rhonchi. Normal respiratory effort on RA.  Cardiovascular: RRR, normal S1/S2, No murmurs, rubs, or gallops.   Abdominal: Soft, nondistended, nontender to palpation. No rebound or guarding  Neuro: CN II-XII grossly intact. Moving all extremities with no focal deficits  MSK: WWP, no peripheral edema  Skin: No lesions, wounds, or bruising  Psych: Appropriate mood and affect.      Medications   Scheduled Medications  allopurinol, 300 mg, oral, Daily  [Held by provider] amLODIPine, 2.5 mg, oral, Daily  ascorbic acid, 500 mg, oral, Daily  calcitriol, 0.25 mcg, oral, Once per day on Monday Wednesday Friday  [Held by provider] chlorthalidone, 25 mg, oral, Daily  dapagliflozin propanediol, 10 mg, oral, Daily before breakfast  enoxaparin, 40 mg, subcutaneous, q12h ROBERTO  lisinopril, 40 mg, oral, Daily  metoprolol succinate XL, 100 mg, oral, Daily  pantoprazole, 40 mg, oral, Daily  perflutren lipid microspheres, 0.5-10 mL of dilution, intravenous, Once in imaging  perflutren protein A microsphere, 0.5 mL, intravenous, Once in imaging  polyethylene glycol, 17 g, oral, Daily  sulfur hexafluoride microsphr, 2 mL, intravenous, Once in imaging     Continuous Medications    PRN Medications         Labs  CBC  Results from last 72 hours   Lab Units 04/08/25  1831   WBC AUTO x10*3/uL 9.7   HEMOGLOBIN g/dL 12.9*   HEMATOCRIT % 38.4*   PLATELETS AUTO x10*3/uL 258        BMP  Results from last 72 hours   Lab Units 04/08/25  1831   SODIUM mmol/L 142   POTASSIUM mmol/L 3.4*   CHLORIDE mmol/L 97*   BUN mg/dL 47*   CREATININE mg/dL 1.91*       Imaging:     === 04/08/25 ===    CT ANGIO CHEST FOR PULMONARY EMBOLISM    - Impression -  1. No evidence of acute pulmonary embolism or acute cardiopulmonary  abnormality.  2. Mildly enlarged main pulmonary artery measuring 3.2 cm, correlate  with concern for pulmonary  arterial hypertension.  3. Moderate coronary artery atherosclerotic calcifications.  Hepatomegaly and hepatic steatosis.      I personally reviewed the image(s)/study and resident interpretation.  I agree with the findings as stated by resident Khang Hamm.  Data analyzed and images interpreted at [facility], [place], [place].    MACRO:  None    Signed by: Mahin Hedrick 4/9/2025 2:16 AM  Dictation workstation:   [id number]            Assessment and plan:  Shola Caraballo is a 59 y.o. male with a hx fof HTN, HFpEF, DM, CKD, RODDY on CPAP presenting with dyspnea on exertion and admitted for further workup. Admission labs with neg troponin, Cr within bl range at 1.91, hg within baseline at 12.9. EKG nonischemic and CTPE without evidence of PE or pulmonary pathology but with mildly enlarged main PA. Low concern for ACS given neg trop and nonischemic ECG and low c/f infectious or acute pulmonary process given neg CT findings. Highest suspicion is for HF exacerbation given worsening mai and tachycardia but pt clinically euvolemic on exam. Will repeat TTE and follow up BNP.     #Dyspnea on exertion  #HFpEF  ::CTPE without acute pulmonary findings  ::Follows with cardiology Dr. Whalen  ::2/23 LVEF 55-60%, suboptimal imaging  Plan:  -BNP add on   -Repeat TTE  -Continue metop succinate 100mg daily, farxiga 10mg daily, lisinopril 40mg daily  -Continue bumex 2mg daily  -Strict Is/Os  -Daily weights   -Consider referral to [facility] on discharge     #HTN   #HLD  ::Pt states has been running in 100s systolic at home and remains dizzy  -Hold home amlodipine 2.5 mg, chlorthalidone 25mg, and hydral 25/isordil 10 (has only been taking daily), restart/consolidate regimen as able  -Not currently on statin    #DMII  ::Last A1c 7.1 3/19/25  ::Hold home glyburide, has not yet started trulicity  -SSI while inpatient      #CKD  ::bl cr 1.8-2, 1.91 on admission  ::Follows with Dr. Baez  -Avoid  nephrotoxins  -Trend RFP  -Hold home FeS as inpatient  -Continue home calcitriol     #Gout  - Continue allopurinol 300 mg daily  #RODDY  - Continue with CPAP, RT consult  #GERD  - Continue pantoprazole 40 mg daily       F: PRN  E: PRN  N: cardiac diet  GI: home PPI  DVT prophylaxis: SubQ lovenox  Code status: Full (CONFIRMED ON ADMISSION)  Surrogate decision maker:  Hal Caraballo (Spouse)  [phone] ([place])       Darshana Funes MD  PGY-2 Internal Medicine

## 2025-04-10 ENCOUNTER — PHARMACY VISIT (OUTPATIENT)
Dept: PHARMACY | Facility: CLINIC | Age: 60
End: 2025-04-10
Payer: COMMERCIAL

## 2025-04-10 VITALS
DIASTOLIC BLOOD PRESSURE: 96 MMHG | SYSTOLIC BLOOD PRESSURE: 141 MMHG | TEMPERATURE: 96.8 F | HEART RATE: 117 BPM | OXYGEN SATURATION: 96 % | BODY MASS INDEX: 42.66 KG/M2 | RESPIRATION RATE: 20 BRPM | WEIGHT: 315 LBS | HEIGHT: 72 IN

## 2025-04-10 PROBLEM — R06.09 DOE (DYSPNEA ON EXERTION): Status: ACTIVE | Noted: 2025-04-10

## 2025-04-10 LAB
ALBUMIN SERPL BCP-MCNC: 4 G/DL (ref 3.4–5)
ANION GAP SERPL CALC-SCNC: 16 MMOL/L (ref 10–20)
BASOPHILS # BLD AUTO: 0.05 X10*3/UL (ref 0–0.1)
BASOPHILS NFR BLD AUTO: 0.7 %
BUN SERPL-MCNC: 33 MG/DL (ref 6–23)
CALCIUM SERPL-MCNC: 9.2 MG/DL (ref 8.6–10.6)
CHLORIDE SERPL-SCNC: 102 MMOL/L (ref 98–107)
CO2 SERPL-SCNC: 25 MMOL/L (ref 21–32)
CREAT SERPL-MCNC: 1.65 MG/DL (ref 0.5–1.3)
EGFRCR SERPLBLD CKD-EPI 2021: 48 ML/MIN/1.73M*2
EOSINOPHIL # BLD AUTO: 0.32 X10*3/UL (ref 0–0.7)
EOSINOPHIL NFR BLD AUTO: 4.2 %
ERYTHROCYTE [DISTWIDTH] IN BLOOD BY AUTOMATED COUNT: 19 % (ref 11.5–14.5)
GLUCOSE BLD MANUAL STRIP-MCNC: 188 MG/DL (ref 74–99)
GLUCOSE BLD MANUAL STRIP-MCNC: 234 MG/DL (ref 74–99)
GLUCOSE SERPL-MCNC: 192 MG/DL (ref 74–99)
HCT VFR BLD AUTO: 36.2 % (ref 41–52)
HGB BLD-MCNC: 11.5 G/DL (ref 13.5–17.5)
IMM GRANULOCYTES # BLD AUTO: 0.05 X10*3/UL (ref 0–0.7)
IMM GRANULOCYTES NFR BLD AUTO: 0.7 % (ref 0–0.9)
LYMPHOCYTES # BLD AUTO: 1.99 X10*3/UL (ref 1.2–4.8)
LYMPHOCYTES NFR BLD AUTO: 25.9 %
MAGNESIUM SERPL-MCNC: 2.16 MG/DL (ref 1.6–2.4)
MCH RBC QN AUTO: 22.9 PG (ref 26–34)
MCHC RBC AUTO-ENTMCNC: 31.8 G/DL (ref 32–36)
MCV RBC AUTO: 72 FL (ref 80–100)
MONOCYTES # BLD AUTO: 0.71 X10*3/UL (ref 0.1–1)
MONOCYTES NFR BLD AUTO: 9.2 %
NEUTROPHILS # BLD AUTO: 4.56 X10*3/UL (ref 1.2–7.7)
NEUTROPHILS NFR BLD AUTO: 59.3 %
NRBC BLD-RTO: 0 /100 WBCS (ref 0–0)
PHOSPHATE SERPL-MCNC: 3.4 MG/DL (ref 2.5–4.9)
PLATELET # BLD AUTO: 241 X10*3/UL (ref 150–450)
POTASSIUM SERPL-SCNC: 4 MMOL/L (ref 3.5–5.3)
Q ONSET: 220 MS
QRS COUNT: 17 BEATS
QRS DURATION: 90 MS
QT INTERVAL: 284 MS
QTC CALCULATION(BAZETT): 372 MS
QTC FREDERICIA: 340 MS
R AXIS: 57 DEGREES
RBC # BLD AUTO: 5.03 X10*6/UL (ref 4.5–5.9)
SODIUM SERPL-SCNC: 139 MMOL/L (ref 136–145)
T AXIS: -8 DEGREES
T OFFSET: 362 MS
VENTRICULAR RATE: 103 BPM
WBC # BLD AUTO: 7.7 X10*3/UL (ref 4.4–11.3)

## 2025-04-10 PROCEDURE — 36415 COLL VENOUS BLD VENIPUNCTURE: CPT

## 2025-04-10 PROCEDURE — 2500000004 HC RX 250 GENERAL PHARMACY W/ HCPCS (ALT 636 FOR OP/ED)

## 2025-04-10 PROCEDURE — 2500000001 HC RX 250 WO HCPCS SELF ADMINISTERED DRUGS (ALT 637 FOR MEDICARE OP): Performed by: STUDENT IN AN ORGANIZED HEALTH CARE EDUCATION/TRAINING PROGRAM

## 2025-04-10 PROCEDURE — 83735 ASSAY OF MAGNESIUM: CPT

## 2025-04-10 PROCEDURE — 2500000002 HC RX 250 W HCPCS SELF ADMINISTERED DRUGS (ALT 637 FOR MEDICARE OP, ALT 636 FOR OP/ED)

## 2025-04-10 PROCEDURE — 2500000001 HC RX 250 WO HCPCS SELF ADMINISTERED DRUGS (ALT 637 FOR MEDICARE OP)

## 2025-04-10 PROCEDURE — 1210000001 HC SEMI-PRIVATE ROOM DAILY

## 2025-04-10 PROCEDURE — 82947 ASSAY GLUCOSE BLOOD QUANT: CPT

## 2025-04-10 PROCEDURE — 85025 COMPLETE CBC W/AUTO DIFF WBC: CPT

## 2025-04-10 PROCEDURE — 80069 RENAL FUNCTION PANEL: CPT

## 2025-04-10 PROCEDURE — 99239 HOSP IP/OBS DSCHRG MGMT >30: CPT | Performed by: STUDENT IN AN ORGANIZED HEALTH CARE EDUCATION/TRAINING PROGRAM

## 2025-04-10 RX ADMIN — OXYCODONE HYDROCHLORIDE AND ACETAMINOPHEN 500 MG: 500 TABLET ORAL at 09:17

## 2025-04-10 RX ADMIN — LISINOPRIL 40 MG: 40 TABLET ORAL at 09:17

## 2025-04-10 RX ADMIN — INSULIN LISPRO 2 UNITS: 100 INJECTION, SOLUTION INTRAVENOUS; SUBCUTANEOUS at 09:18

## 2025-04-10 RX ADMIN — DAPAGLIFLOZIN 10 MG: 10 TABLET, FILM COATED ORAL at 09:18

## 2025-04-10 RX ADMIN — PANTOPRAZOLE SODIUM 40 MG: 40 TABLET, DELAYED RELEASE ORAL at 09:17

## 2025-04-10 RX ADMIN — AMLODIPINE BESYLATE 5 MG: 5 TABLET ORAL at 09:17

## 2025-04-10 RX ADMIN — ALLOPURINOL 300 MG: 100 TABLET ORAL at 09:17

## 2025-04-10 RX ADMIN — METOPROLOL SUCCINATE 100 MG: 50 TABLET, EXTENDED RELEASE ORAL at 09:17

## 2025-04-10 RX ADMIN — ENOXAPARIN SODIUM 40 MG: 100 INJECTION SUBCUTANEOUS at 09:18

## 2025-04-10 ASSESSMENT — COGNITIVE AND FUNCTIONAL STATUS - GENERAL
MOBILITY SCORE: 24
DAILY ACTIVITIY SCORE: 24

## 2025-04-10 ASSESSMENT — PAIN SCALES - GENERAL: PAINLEVEL_OUTOF10: 0 - NO PAIN

## 2025-04-10 NOTE — CARE PLAN
The patient's goals for the shift include      The clinical goals for the shift include patient will remain free from falls and injuries during the shift    Over the shift, the patient did not make progress toward the following goals. Barriers to progression include . Recommendations to address these barriers include   Problem: Diabetes  Goal: Achieve decreasing blood glucose levels by end of shift  Outcome: Progressing  Goal: Increase stability of blood glucose readings by end of shift  Outcome: Progressing  Goal: Decrease in ketones present in urine by end of shift  Outcome: Progressing  Goal: Maintain electrolyte levels within acceptable range throughout shift  Outcome: Progressing  Goal: Maintain glucose levels >70mg/dl to <250mg/dl throughout shift  Outcome: Progressing  Goal: No changes in neurological exam by end of shift  Outcome: Progressing  Goal: Learn about and adhere to nutrition recommendations by end of shift  Outcome: Progressing  Goal: Vital signs within normal range for age by end of shift  Outcome: Progressing  Goal: Increase self care and/or family involovement by end of shift  Outcome: Progressing  Goal: Receive DSME education by end of shift  Outcome: Progressing     Problem: Pain  Goal: Takes deep breaths with improved pain control throughout the shift  Outcome: Progressing  Goal: Turns in bed with improved pain control throughout the shift  Outcome: Progressing  Goal: Walks with improved pain control throughout the shift  Outcome: Progressing  Goal: Performs ADL's with improved pain control throughout shift  Outcome: Progressing  Goal: Participates in PT with improved pain control throughout the shift  Outcome: Progressing  Goal: Free from opioid side effects throughout the shift  Outcome: Progressing  Goal: Free from acute confusion related to pain meds throughout the shift  Outcome: Progressing     Problem: Pain - Adult  Goal: Verbalizes/displays adequate comfort level or baseline comfort  level  Outcome: Progressing     Problem: Safety - Adult  Goal: Free from fall injury  Outcome: Progressing     Problem: Discharge Planning  Goal: Discharge to home or other facility with appropriate resources  Outcome: Progressing     Problem: Chronic Conditions and Co-morbidities  Goal: Patient's chronic conditions and co-morbidity symptoms are monitored and maintained or improved  Outcome: Progressing     Problem: Nutrition  Goal: Nutrient intake appropriate for maintaining nutritional needs  Outcome: Progressing   .

## 2025-04-10 NOTE — CARE PLAN
Date/Time Patient Seen:  		  Referring MD:   Data Reviewed	       Patient is a 84y old  Female who presents with a chief complaint of dyspnea (21 Sep 2022 13:04)      Subjective/HPI     PAST MEDICAL & SURGICAL HISTORY:  HTN (hypertension)    Bipolar disorder          Medication list         MEDICATIONS  (STANDING):  amLODIPine   Tablet 10 milliGRAM(s) Oral daily  dextrose 5% + sodium chloride 0.45%. 1000 milliLiter(s) (40 mL/Hr) IV Continuous <Continuous>  losartan 100 milliGRAM(s) Oral daily  memantine 10 milliGRAM(s) Oral two times a day  mirtazapine 15 milliGRAM(s) Oral at bedtime  pantoprazole    Tablet 40 milliGRAM(s) Oral before breakfast  potassium chloride    Tablet ER 20 milliEquivalent(s) Oral daily  traZODone 50 milliGRAM(s) Oral at bedtime    MEDICATIONS  (PRN):  acetaminophen     Tablet .. 650 milliGRAM(s) Oral every 6 hours PRN Temp greater or equal to 38C (100.4F), Mild Pain (1 - 3)  aluminum hydroxide/magnesium hydroxide/simethicone Suspension 30 milliLiter(s) Oral every 4 hours PRN Dyspepsia  melatonin 3 milliGRAM(s) Oral at bedtime PRN Insomnia  ondansetron Injectable 4 milliGRAM(s) IV Push every 8 hours PRN Nausea and/or Vomiting         Vitals log        ICU Vital Signs Last 24 Hrs  T(C): 36.4 (22 Sep 2022 04:06), Max: 36.6 (21 Sep 2022 13:06)  T(F): 97.5 (22 Sep 2022 04:06), Max: 97.9 (21 Sep 2022 13:06)  HR: 99 (22 Sep 2022 04:06) (82 - 99)  BP: 137/78 (22 Sep 2022 04:06) (96/61 - 137/78)  BP(mean): --  ABP: --  ABP(mean): --  RR: 18 (22 Sep 2022 04:06) (18 - 20)  SpO2: 92% (22 Sep 2022 04:06) (92% - 96%)    O2 Parameters below as of 22 Sep 2022 04:06  Patient On (Oxygen Delivery Method): room air                 Input and Output:  I&O's Detail    20 Sep 2022 07:01  -  21 Sep 2022 07:00  --------------------------------------------------------  IN:    dextrose 5% + sodium chloride 0.45%: 680 mL    Oral Fluid: 700 mL  Total IN: 1380 mL    OUT:    Chest Tube (mL): 100 mL    Voided (mL): 850 mL  Total OUT: 950 mL    Total NET: 430 mL      21 Sep 2022 07:01  -  22 Sep 2022 05:33  --------------------------------------------------------  IN:    dextrose 5% + sodium chloride 0.45%: 480 mL    Oral Fluid: 600 mL  Total IN: 1080 mL    OUT:    Chest Tube (mL): 65 mL    Voided (mL): 700 mL  Total OUT: 765 mL    Total NET: 315 mL          Lab Data                        13.8   7.38  )-----------( 226      ( 21 Sep 2022 07:03 )             42.1     09-21    140  |  107  |  8   ----------------------------<  107<H>  4.1   |  26  |  0.53    Ca    8.9      21 Sep 2022 07:03              Review of Systems	      Objective     Physical Examination    heart s1s2  lung dec BS      Pertinent Lab findings & Imaging      Sara:  NO   Adequate UO     I&O's Detail    20 Sep 2022 07:01  -  21 Sep 2022 07:00  --------------------------------------------------------  IN:    dextrose 5% + sodium chloride 0.45%: 680 mL    Oral Fluid: 700 mL  Total IN: 1380 mL    OUT:    Chest Tube (mL): 100 mL    Voided (mL): 850 mL  Total OUT: 950 mL    Total NET: 430 mL      21 Sep 2022 07:01  -  22 Sep 2022 05:33  --------------------------------------------------------  IN:    dextrose 5% + sodium chloride 0.45%: 480 mL    Oral Fluid: 600 mL  Total IN: 1080 mL    OUT:    Chest Tube (mL): 65 mL    Voided (mL): 700 mL  Total OUT: 765 mL    Total NET: 315 mL               Discussed with:     Cultures:	        Radiology                             The patient's goals for the shift include      The clinical goals for the shift include pt will not fall    Over the shift, pt met goals and Dc'd home. Pt ambulated to car.

## 2025-04-11 ENCOUNTER — PATIENT OUTREACH (OUTPATIENT)
Dept: CARE COORDINATION | Facility: CLINIC | Age: 60
End: 2025-04-11
Payer: COMMERCIAL

## 2025-04-11 SDOH — ECONOMIC STABILITY: FOOD INSECURITY
ARE ANY OF YOUR NEEDS URGENT? FOR EXAMPLE, UNCERTAINTY OF WHERE YOU WILL GET YOUR NEXT MEAL OR NOT HAVING THE MEDICATIONS YOU NEED TO TAKE TOMORROW.: NO

## 2025-04-11 SDOH — ECONOMIC STABILITY: GENERAL: WOULD YOU LIKE HELP WITH ANY OF THE FOLLOWING NEEDS?: I DONT NEED HELP WITH ANY OF THESE

## 2025-04-11 NOTE — DISCHARGE SUMMARY
Discharge Diagnosis  Chest pain, unspecified type    Issues Requiring Follow-Up  ALMEIDA, patient to follow up with his cardiology in the clinic for possible stress test. And also evaluation for reduced RV systolic function     Discharge Meds     Medication List      CHANGE how you take these medications     glyBURIDE 5 mg tablet; Commonly known as: Diabeta; TAKE 2 TABLETS BY   MOUTH TWO TIMES A DAY BEFORE BREAKFAST AND SUPPER. NEED TO MAKE   APPOINTMENT WITH DOCTOR.; What changed: how much to take, how to take   this, when to take this     CONTINUE taking these medications     Accu-Chek Guide test strips; Generic drug: blood sugar diagnostic; Use   to test blood sugar twice daily.   Accu-Chek Softclix Lancets misc; Generic drug: lancets; USE TO TEST   BLOOD SUGAR TWICE DAILY   allopurinol 300 mg tablet; Commonly known as: Zyloprim; Take 1 tablet   (300 mg) by mouth once daily.   amLODIPine 2.5 mg tablet; Commonly known as: Norvasc; Take 1 tablet (2.5   mg) by mouth once daily.   bumetanide 2 mg tablet; Commonly known as: Bumex; Take 1 tablet (2 mg)   by mouth once daily.   calcitriol 0.25 mcg capsule; Commonly known as: Rocaltrol; Take 1   capsule (0.25 mcg) by mouth 3 times a week.   chlorthalidone 25 mg tablet; Commonly known as: [place]; Take 1 tablet   by mouth daily   colchicine 0.6 mg tablet; Take 1 tablet (0.6 mg) by mouth once daily as   needed for gout flare.   DAILY VITAMIN FORMULA-MINERALS tablet; Generic drug: multivitamin with   minerals   dapagliflozin propanediol 10 mg tablet; Commonly known as: Farxiga; TAKE   1 TABLET BY MOUTH EVERY MORNING   DIGESTIVE ENZYMES ORAL   hydrALAZINE 25 mg tablet; Commonly known as: Apresoline; Take 1 tablet   (25 mg) by mouth 2 times a day.   isosorbide dinitrate 10 mg tablet; Commonly known as: Isordil; Take 1   tablet (10 mg) by mouth once daily.   lisinopril 40 mg tablet; TAKE 1 TABLET BY MOUTH ONCE DAILY   metoprolol succinate  mg 24 hr tablet; Commonly known  as:   Toprol-XL; TAKE 1 TABLET BY MOUTH ONCE DAILY   Trulicity 0.75 mg/0.5 mL pen injector; Generic drug: dulaglutide; Inject   0.75 mg under the skin 1 (one) time per week.   Vitamin C 1,000 mg tablet; Generic drug: ascorbic acid     ASK your doctor about these medications     pantoprazole 40 mg EC tablet; Commonly known as: ProtoNix; Take 1 tablet   (40 mg) by mouth once daily.       Test Results Pending At Discharge  Pending Labs       No current pending labs.            Hospital Course   Shola Caraballo is a 59 y.o. male with a hx fof HTN, HFpEF, DM, CKD, RODDY on CPAP presenting with dyspnea on exertion and admitted for further workup. Admission labs with neg troponin, Cr within bl range at 1.91, hg within baseline at 12.9. EKG nonischemic and CTPE without evidence of PE or pulmonary pathology but with mildly enlarged main PA. Low concern for ACS given neg trop and nonischemic ECG and low c/f infectious or acute pulmonary process given neg CT findings. Highest suspicion is for HF exacerbation given worsening mai and tachycardia but pt clinically euvolemic on exam.   BNP was normal. TTE showed normal LVEF of 55%, there is reduced right ventricular systolic function, suboptimal agitated saline contrast study did not identify any obvious intracardiac shunt. Pt symptoms improved and he will be discharged home with recommendations as above     Pertinent Physical Exam At Time of Discharge  Physical Exam  Constitutional: Well-developed male in no acute distress.  HEENT: NCAT. EOMI.   Respiratory: CTAB. No wheezes, crackles, or rhonchi. Normal respiratory effort on RA.  Cardiovascular: RRR, normal S1/S2, No murmurs, rubs, or gallops.   Abdominal: Soft, nondistended, nontender to palpation. No rebound or guarding  Neuro: CN II-XII grossly intact. Moving all extremities with no focal deficits  MSK: WWP, no peripheral edema  Skin: No lesions, wounds, or bruising  Psych: Appropriate mood and affect.    Outpatient  Follow-Up  Future Appointments   Date Time Provider Department Center   4/14/2025 12:00 PM Christopher D'Amico, DO [id number] [facility]   4/15/2025  8:40 AM Alecia Whalen MD [id number] [facility]   6/2/2025 10:00 AM Neelima Walsh APRN-CNP [id number] [facility]   6/11/2025  9:20 AM Jaida Baez MD [id number] [facility]   8/18/2025  9:00 AM Zander Franco MD [id number] [facility]   9/8/2025  9:30 AM Leatha Carlos MD [id number] [facility]   9/18/2025  9:00 AM Christopher D'Amico, DO [id number] [facility]   11/10/2025 10:00 AM [facility] ECHO 2 [id number] [facility]   1/27/2026  9:00 AM Maxx Mckay MD [id number] [facility]   3/10/2026 10:00 AM Alecia Whalen MD [id number] [facility]         Celina Delatorre MD

## 2025-04-11 NOTE — PROGRESS NOTES
Reviewed chart prior to patient outreach. Outreach call to patient to support a smooth transition of care from recent admission.    Discharge facility: [facility]  Admission date: 4/9/25  Discharge date:  4/10/25    PCP Appointment Date: 4/14/25  Specialist Appointment Date: 4/15/25 cardiology    Issues Requiring Follow-Up  ALMEIDA, patient to follow up with his cardiology in the clinic for possible stress test. And also evaluation for reduced RV systolic function     Spoke with patient, reviewed discharge medications, discharge instructions, assessed social needs, and provided education on importance of follow-up appointment with provider.     See  Hospital Encounter and Summary, and Discharge assessment below for further details. Information obtained from discharge summary from EMR.    Hospital Course   Shola Caraballo is a 59 y.o. male with a hx fof HTN, HFpEF, DM, CKD, RODDY on CPAP presenting with dyspnea on exertion and admitted for further workup. Admission labs with neg troponin, Cr within bl range at 1.91, hg within baseline at 12.9. EKG nonischemic and CTPE without evidence of PE or pulmonary pathology but with mildly enlarged main PA. Low concern for ACS given neg trop and nonischemic ECG and low c/f infectious or acute pulmonary process given neg CT findings. Highest suspicion is for HF exacerbation given worsening almeida and tachycardia but pt clinically euvolemic on exam.   BNP was normal. TTE showed normal LVEF of 55%, there is reduced right ventricular systolic function, suboptimal agitated saline contrast study did not identify any obvious intracardiac shunt. Pt symptoms improved and he will be discharged home with recommendations as above     CM outreach:  Wrap Up  Wrap Up Additional Comments: Patient reports no change in dyspnea on exertion. His heart rate has been going up to 110. He has a follow-up appointment scheduled with is PCP on 4/14/25 and his cardiologist on 4/15/25 to address. Denies edema  or chest pain. (4/11/2025  1:40 PM)    Medications  Medications reviewed with patient/caregiver?: Yes (4/11/2025  1:40 PM)  Is the patient having any side effects they believe may be caused by any medication additions or changes?: No (4/11/2025  1:40 PM)  Does the patient have all medications ordered at discharge?: Not applicable (4/11/2025  1:40 PM)  Prescription Comments: Reviewed medications. Patient taking medications as prescribed and listed on discharge instructions. At time of outreach, not all medications listed on discharge instructions are active in current medications in system, and have been discontinued. (4/11/2025  1:40 PM)  Is the patient taking all medications as directed (includes completed medication regime)?: Yes (4/11/2025  1:40 PM)  Care Management Interventions: Notified provider (4/11/2025  1:40 PM)    Appointments  Does the patient have a primary care provider?: Yes (4/11/2025  1:40 PM)  Care Management Interventions: Verified appointment date/time/provider (4/11/2025  1:40 PM)  Has the patient kept scheduled appointments due by today?: Yes (4/11/2025  1:40 PM)    Patient Teaching  Does the patient have access to their discharge instructions?: Yes (4/11/2025  1:40 PM)  Care Management Interventions: Reviewed instructions with patient (4/11/2025  1:40 PM)  What is the patient's perception of their health status since discharge?: Same (4/11/2025  1:40 PM)  Is the patient/caregiver able to teach back the hierarchy of who to call/visit for symptoms/problems? PCP, Specialist, Home Health nurse, Urgent Care, ED, 911: Yes (4/11/2025  1:40 PM)    Will continue to monitor through transition period. Provided patient with my contact information for potential needs.    Claire Best RN BSN  [facility] Care Manager  [phone]

## 2025-04-12 ENCOUNTER — HOSPITAL ENCOUNTER (EMERGENCY)
Facility: HOSPITAL | Age: 60
Discharge: HOME | End: 2025-04-12
Attending: EMERGENCY MEDICINE
Payer: COMMERCIAL

## 2025-04-12 VITALS
WEIGHT: 315 LBS | BODY MASS INDEX: 43.4 KG/M2 | RESPIRATION RATE: 18 BRPM | SYSTOLIC BLOOD PRESSURE: 129 MMHG | TEMPERATURE: 98.4 F | DIASTOLIC BLOOD PRESSURE: 70 MMHG | OXYGEN SATURATION: 97 % | HEART RATE: 89 BPM

## 2025-04-12 DIAGNOSIS — R10.84 GENERALIZED ABDOMINAL PAIN: Primary | ICD-10-CM

## 2025-04-12 LAB
ALBUMIN SERPL BCP-MCNC: 4.4 G/DL (ref 3.4–5)
ALP SERPL-CCNC: 73 U/L (ref 33–120)
ALT SERPL W P-5'-P-CCNC: 22 U/L (ref 10–52)
ANION GAP SERPL CALC-SCNC: 12 MMOL/L (ref 10–20)
AST SERPL W P-5'-P-CCNC: 28 U/L (ref 9–39)
BASOPHILS # BLD AUTO: 0.04 X10*3/UL (ref 0–0.1)
BASOPHILS NFR BLD AUTO: 0.4 %
BILIRUB SERPL-MCNC: 0.8 MG/DL (ref 0–1.2)
BUN SERPL-MCNC: 24 MG/DL (ref 6–23)
CALCIUM SERPL-MCNC: 9.7 MG/DL (ref 8.6–10.6)
CHLORIDE SERPL-SCNC: 101 MMOL/L (ref 98–107)
CO2 SERPL-SCNC: 30 MMOL/L (ref 21–32)
CREAT SERPL-MCNC: 1.45 MG/DL (ref 0.5–1.3)
EGFRCR SERPLBLD CKD-EPI 2021: 56 ML/MIN/1.73M*2
EOSINOPHIL # BLD AUTO: 0.13 X10*3/UL (ref 0–0.7)
EOSINOPHIL NFR BLD AUTO: 1.3 %
ERYTHROCYTE [DISTWIDTH] IN BLOOD BY AUTOMATED COUNT: 18.6 % (ref 11.5–14.5)
GLUCOSE SERPL-MCNC: 129 MG/DL (ref 74–99)
HCT VFR BLD AUTO: 36.3 % (ref 41–52)
HGB BLD-MCNC: 11.9 G/DL (ref 13.5–17.5)
IMM GRANULOCYTES # BLD AUTO: 0.05 X10*3/UL (ref 0–0.7)
IMM GRANULOCYTES NFR BLD AUTO: 0.5 % (ref 0–0.9)
LIPASE SERPL-CCNC: 13 U/L (ref 9–82)
LYMPHOCYTES # BLD AUTO: 1.84 X10*3/UL (ref 1.2–4.8)
LYMPHOCYTES NFR BLD AUTO: 18.9 %
MCH RBC QN AUTO: 23 PG (ref 26–34)
MCHC RBC AUTO-ENTMCNC: 32.8 G/DL (ref 32–36)
MCV RBC AUTO: 70 FL (ref 80–100)
MONOCYTES # BLD AUTO: 0.72 X10*3/UL (ref 0.1–1)
MONOCYTES NFR BLD AUTO: 7.4 %
NEUTROPHILS # BLD AUTO: 6.97 X10*3/UL (ref 1.2–7.7)
NEUTROPHILS NFR BLD AUTO: 71.5 %
NRBC BLD-RTO: 0 /100 WBCS (ref 0–0)
PLATELET # BLD AUTO: 254 X10*3/UL (ref 150–450)
POTASSIUM SERPL-SCNC: 3.9 MMOL/L (ref 3.5–5.3)
PROT SERPL-MCNC: 8 G/DL (ref 6.4–8.2)
RBC # BLD AUTO: 5.17 X10*6/UL (ref 4.5–5.9)
SODIUM SERPL-SCNC: 139 MMOL/L (ref 136–145)
WBC # BLD AUTO: 9.8 X10*3/UL (ref 4.4–11.3)

## 2025-04-12 PROCEDURE — 36415 COLL VENOUS BLD VENIPUNCTURE: CPT | Performed by: EMERGENCY MEDICINE

## 2025-04-12 PROCEDURE — 96372 THER/PROPH/DIAG INJ SC/IM: CPT

## 2025-04-12 PROCEDURE — 80053 COMPREHEN METABOLIC PANEL: CPT | Performed by: EMERGENCY MEDICINE

## 2025-04-12 PROCEDURE — 85025 COMPLETE CBC W/AUTO DIFF WBC: CPT | Performed by: EMERGENCY MEDICINE

## 2025-04-12 PROCEDURE — 99284 EMERGENCY DEPT VISIT MOD MDM: CPT | Performed by: EMERGENCY MEDICINE

## 2025-04-12 PROCEDURE — 2500000001 HC RX 250 WO HCPCS SELF ADMINISTERED DRUGS (ALT 637 FOR MEDICARE OP)

## 2025-04-12 PROCEDURE — 2500000004 HC RX 250 GENERAL PHARMACY W/ HCPCS (ALT 636 FOR OP/ED)

## 2025-04-12 PROCEDURE — 83690 ASSAY OF LIPASE: CPT | Performed by: EMERGENCY MEDICINE

## 2025-04-12 RX ORDER — ACETAMINOPHEN 325 MG/1
975 TABLET ORAL ONCE
Status: COMPLETED | OUTPATIENT
Start: 2025-04-12 | End: 2025-04-12

## 2025-04-12 RX ORDER — DICYCLOMINE HYDROCHLORIDE 10 MG/ML
20 INJECTION INTRAMUSCULAR ONCE
Status: COMPLETED | OUTPATIENT
Start: 2025-04-12 | End: 2025-04-12

## 2025-04-12 RX ADMIN — ACETAMINOPHEN 975 MG: 325 TABLET ORAL at 17:45

## 2025-04-12 RX ADMIN — DICYCLOMINE HYDROCHLORIDE 20 MG: 10 INJECTION, SOLUTION INTRAMUSCULAR at 17:45

## 2025-04-12 ASSESSMENT — LIFESTYLE VARIABLES
EVER FELT BAD OR GUILTY ABOUT YOUR DRINKING: NO
TOTAL SCORE: 0
EVER HAD A DRINK FIRST THING IN THE MORNING TO STEADY YOUR NERVES TO GET RID OF A HANGOVER: NO
HAVE PEOPLE ANNOYED YOU BY CRITICIZING YOUR DRINKING: NO
HAVE YOU EVER FELT YOU SHOULD CUT DOWN ON YOUR DRINKING: NO

## 2025-04-12 ASSESSMENT — PAIN SCALES - GENERAL
PAINLEVEL_OUTOF10: 8
PAINLEVEL_OUTOF10: 7

## 2025-04-12 ASSESSMENT — PAIN - FUNCTIONAL ASSESSMENT
PAIN_FUNCTIONAL_ASSESSMENT: 0-10
PAIN_FUNCTIONAL_ASSESSMENT: 0-10

## 2025-04-12 NOTE — ED PROVIDER NOTES
Emergency Department Provider Note          History of Present Illness     CC: Abdominal Cramping     History provided by: Patient  Limitations to History: None    HPI:   Shola Caraballo is a 59 y.o.male with PMH HTN, HFpEF, DM, CKD, RODDY on CPAP presenting to the Emergency Department for abd pain.     Presenting with chief concern of abd pain. Symptoms began yesterday.  Patient states that he took his first dose of Trulicity yesterday prior to onset of symptoms.  Previously, he has been on Mounjaro, which was discontinued 2/2 abdominal cramping/bloating.  Current pain is described as cramping. Improved with laying down. Unaffected by palpation. Associated with nausea, although patient currently denies current nausea.  Does endorse decreased appetite.  Patient has not eaten today, has been able to drink today.  Denies vomiting, constipation, diarrhea, CP, SOB, urinary symptoms (pain, retention, blood, incontinence).  Patient does think this is a side effect of the Trulicity dose.  Patient also takes glyburide and Farxiga for his diabetes, which he has been on for at least a few months.  He has not had any medication changes in the past few weeks other than Trulicity.    Records Reviewed: Recent available ED and inpatient notes reviewed in EMR.    PMHx/PSHx:  Per HPI.   - has a past medical history of Cellulitis of unspecified toe, Chronic combined systolic (congestive) and diastolic (congestive) heart failure, Chronic kidney disease, unspecified, Chronic venous hypertension (idiopathic) with inflammation of bilateral lower extremity, Contusion of unspecified lesser toe(s) without damage to nail, initial encounter, Corns and callosities, Essential (primary) hypertension, Gout, unspecified, Methicillin susceptible Staphylococcus aureus infection, unspecified site, Other acute osteomyelitis, unspecified site, Other bacterial infections of unspecified site, Other conditions influencing health status, Other conditions  influencing health status, Other specified soft tissue disorders, Other specified soft tissue disorders, Pain in left foot, Personal history of diseases of the blood and blood-forming organs and certain disorders involving the immune mechanism, Personal history of other diseases of the circulatory system, Personal history of other diseases of the digestive system, Personal history of other endocrine, nutritional and metabolic disease, Personal history of other endocrine, nutritional and metabolic disease, Sciatica, left side, and Unspecified injury of unspecified foot, initial encounter.  - has a past surgical history that includes Other surgical history (07/28/2021); Other surgical history (07/28/2021); and Other surgical history (07/27/2020).  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 edema associated with type 2 diabetes mellitus; Elevated parathyroid hormone; Foot osteomyelitis, left (Multi); Gout; Hyperlipidemia; Incisional hernia; Left-sided low back pain with left-sided sciatica; Low HDL (under 40); Low vitamin D level; Nasal congestion with rhinorrhea; Combined forms of age-related cataract of both eyes; Obstructive sleep apnea; Onychomycosis of toenail; Prerenal azotemia; Pressure ulcer of dorsum of left foot; Refraction error; Sickle cell trait (CMS-HCC); Ulcer of right leg (Multi); Uncontrolled hypertension; History of morbid obesity; HTN (hypertension), benign; Peripheral vascular disease (CMS-HCC); BMI 40.0-44.9, adult (Multi); Cellulitis of left lower limb; Type 2 diabetes mellitus with other specified complication; Encounter for adjustment and management of vascular access device; Hyperlipidemia, unspecified; Low back pain; Morbid obesity due to excess calories (Multi); Muscle weakness (generalized); Chronic ulcer of heel, left, with unspecified severity (Multi);  Obstructive sleep apnea (adult) (pediatric); Osteomyelitis; Other chronic pain; Personal history of nicotine dependence; Primary localized osteoarthrosis of pelvic region; Sick; Streptococcus infection, group B; Acute osteomyelitis of left calcaneus (Multi); COVID-19; Stage 3 chronic kidney disease (Multi); Abdominal pain; Diarrhea; Nausea; Partial intestinal obstruction (Multi); Small bowel obstruction (Multi); Congestive heart failure; Essential hypertension, benign; Sleep apnea; Anemia; Contact with and (suspected) exposure to covid-19; Hematuria; History of heart failure; Rectal hemorrhage; Premature atrial contraction; Vitamin D deficiency; Conjunctival pigmentation, left; Secondary hyperparathyroidism (Multi); Chest pain, unspecified type; and ALMEIDA (dyspnea on exertion) on their problem list.    Medications:  Current Outpatient Medications   Medication Instructions    allopurinol (ZYLOPRIM) 300 mg, oral, Daily    amLODIPine (NORVASC) 2.5 mg, oral, Daily    ascorbic acid (VITAMIN C) 500 mg, oral, Daily    blood sugar diagnostic (Accu-Chek Guide test strips) strip Use to test blood sugar twice daily.    bumetanide (BUMEX) 2 mg, oral, Daily    calcitriol (ROCALTROL) 0.25 mcg, oral, 3 times weekly    chlorthalidone (Hygroton) 25 mg tablet Take 1 tablet by mouth daily    colchicine 0.6 mg tablet Take 1 tablet (0.6 mg) by mouth once daily as needed for gout flare.    dapagliflozin propanediol (Farxiga) 10 mg TAKE 1 TABLET BY MOUTH EVERY MORNING    enzymes,digestive (DIGESTIVE ENZYMES ORAL) 1 capsule, oral, Daily PRN    glyBURIDE (Diabeta) 5 mg tablet TAKE 2 TABLETS BY MOUTH TWO TIMES A DAY BEFORE BREAKFAST AND SUPPER. NEED TO MAKE APPOINTMENT WITH DOCTOR.    hydrALAZINE (APRESOLINE) 25 mg, oral, 2 times daily    isosorbide dinitrate (ISORDIL) 10 mg, oral, Daily    lancets misc USE TO TEST BLOOD SUGAR TWICE DAILY    lisinopril 40 mg tablet TAKE 1 TABLET BY MOUTH ONCE DAILY    metoprolol succinate XL (Toprol-XL) 100 mg  24 hr tablet TAKE 1 TABLET BY MOUTH ONCE DAILY    multivitamin with minerals (DAILY VITAMIN FORMULA-MINERALS) tablet 1 tablet DAILY (route: oral)    pantoprazole (PROTONIX) 40 mg, oral, Daily    Trulicity 0.75 mg, subcutaneous, Once Weekly        Allergies:  Patient has no known allergies.    Social History:  - Tobacco:  reports that he has quit smoking. His smoking use included cigarettes. He has been exposed to tobacco smoke. He has never used smokeless tobacco.   - Alcohol:  reports that he does not currently use alcohol.   - Illicit Drugs:  reports that he does not currently use drugs.     ROS:  Per HPI.       Physical Exam     Triage Vitals:  T 36.7 °C (98 °F)  HR 99  /85  RR 16  O2 98 % None (Room air)    General: Awake, alert, in no acute distress, obese  Resp: Breathing non-labored, speaking in full sentences.    GI: Mild tenderness to palpation of upper abdomen diffusely. Distended. Soft. No rebound or guarding.  MSK/Extremities: No gross bony deformities. Moving all extremities  Skin: Warm. Appropriate color  Neuro: Alert. Speech is fluent.  Gross strength and sensation intact in b/l UE and LEs  Psych: Appropriate mood and affect          [place] Coma Scale Score: 15                    Medical Decision Making & ED Course     EKG: Please see ED Course for full interpretation.    Medical Decision Making   Shola Caraballo is a 59 y.o.male presenting to the Emergency Department for abd pain    The patient presented to the ED with complaints of abd pain. Vitals were notable for near tachycardia, however, likely 2/2 pain. Physical exam was significant for mild upper abdominal tenderness. Most likely 2/2 Trulicity. Also on the ddx is gall bladder pathology, gastritis, GERD. Labs (CBC, CMP, lipase) were ordered. Patient was treated with Tylenol, Bentyl. Results were reviewed and were significant for no leukocytosis, normal lipase and LFTs. On reexamination of the patient, slight improvement in pain.  Patient not interested in trying other meds at this time and would like to go home. Plan to discharge with Bentyl prescription, however, patient declined.  Patient should follow-up with his endocrinologist and/or PCP regarding further diabetes management.  Should discontinue Trulicity. I reviewed the case with the attending ED physician Dr. Macdonald. The attending ED physician agrees with the plan. Patient and/or patient’s representative was counseled regarding labs, imaging, likely diagnosis, and plan. All questions were answered           Independent Result Review and Interpretation: Relevant laboratory and radiographic results were reviewed.  As necessary, they are commented on in the ED Course.    Chronic conditions affecting the patient's care: As documented above in MDM.    Disposition   Discharge    Alessia Harris DO  Transitional Year, PGY-1       Procedures     Procedures ? SmartLinks last updated 4/12/2025 6:37 PM          Alessia Harris DO  Resident  04/12/25 [id number]       Alessia Harris DO  Resident  04/12/25 [id number]

## 2025-04-12 NOTE — DISCHARGE INSTRUCTIONS
You came to the emergency department for abdominal pain.  Your workup included lab work.  Lab work was reassuring, and did not suggest any acute infection or inflammation.  As discussed, this is very likely a side effect of your Trulicity dose.  Please discontinue taking Trulicity.  Please follow-up with your endocrinologist/PCP, whomever manages your diabetes, for further medication regimen adjustments.  This plan was discussed with you in detail.  You were offered a prescription for Bentyl, but declined.  You were given the opportunity to ask questions, all questions were answered.  You can return to the ED for severe uncontrolled pain, intractable vomiting.

## 2025-04-13 DIAGNOSIS — E11.65 INADEQUATELY CONTROLLED DIABETES MELLITUS (MULTI): Primary | ICD-10-CM

## 2025-04-13 RX ORDER — PEN NEEDLE, DIABETIC 30 GX3/16"
NEEDLE, DISPOSABLE MISCELLANEOUS
Qty: 100 EACH | Refills: 1 | Status: SHIPPED | OUTPATIENT
Start: 2025-04-13

## 2025-04-13 RX ORDER — INSULIN DEGLUDEC 100 U/ML
20 INJECTION, SOLUTION SUBCUTANEOUS NIGHTLY
Qty: 18 ML | Refills: 1 | Status: SHIPPED | OUTPATIENT
Start: 2025-04-13 | End: 2026-04-13

## 2025-04-14 ENCOUNTER — APPOINTMENT (OUTPATIENT)
Dept: PRIMARY CARE | Facility: CLINIC | Age: 60
End: 2025-04-14
Payer: COMMERCIAL

## 2025-04-14 VITALS
BODY MASS INDEX: 43.4 KG/M2 | OXYGEN SATURATION: 94 % | HEIGHT: 72 IN | HEART RATE: 83 BPM | SYSTOLIC BLOOD PRESSURE: 117 MMHG | DIASTOLIC BLOOD PRESSURE: 72 MMHG

## 2025-04-14 DIAGNOSIS — I48.91 ATRIAL FIBRILLATION, UNSPECIFIED TYPE (MULTI): Primary | ICD-10-CM

## 2025-04-14 DIAGNOSIS — I50.9 CONGESTIVE HEART FAILURE, UNSPECIFIED HF CHRONICITY, UNSPECIFIED HEART FAILURE TYPE: ICD-10-CM

## 2025-04-14 DIAGNOSIS — R06.02 SHORTNESS OF BREATH ON EXERTION: ICD-10-CM

## 2025-04-14 PROCEDURE — RXMED WILLOW AMBULATORY MEDICATION CHARGE

## 2025-04-14 PROCEDURE — 93000 ELECTROCARDIOGRAM COMPLETE: CPT | Performed by: STUDENT IN AN ORGANIZED HEALTH CARE EDUCATION/TRAINING PROGRAM

## 2025-04-14 PROCEDURE — 1036F TOBACCO NON-USER: CPT | Performed by: STUDENT IN AN ORGANIZED HEALTH CARE EDUCATION/TRAINING PROGRAM

## 2025-04-14 PROCEDURE — 99496 TRANSJ CARE MGMT HIGH F2F 7D: CPT | Performed by: STUDENT IN AN ORGANIZED HEALTH CARE EDUCATION/TRAINING PROGRAM

## 2025-04-14 PROCEDURE — 3074F SYST BP LT 130 MM HG: CPT | Performed by: STUDENT IN AN ORGANIZED HEALTH CARE EDUCATION/TRAINING PROGRAM

## 2025-04-14 PROCEDURE — 4010F ACE/ARB THERAPY RXD/TAKEN: CPT | Performed by: STUDENT IN AN ORGANIZED HEALTH CARE EDUCATION/TRAINING PROGRAM

## 2025-04-14 PROCEDURE — 3078F DIAST BP <80 MM HG: CPT | Performed by: STUDENT IN AN ORGANIZED HEALTH CARE EDUCATION/TRAINING PROGRAM

## 2025-04-14 ASSESSMENT — ENCOUNTER SYMPTOMS
DEPRESSION: 0
LOSS OF SENSATION IN FEET: 0
OCCASIONAL FEELINGS OF UNSTEADINESS: 0

## 2025-04-14 NOTE — PROGRESS NOTES
"59-year-old male presenting for hospital follow-up, admitted 4/8/2025, discharged/10/2025.    \"Hospital Course   Shola Caraballo is a 59 y.o. male with a hx fof HTN, HFpEF, DM, CKD, RODDY on CPAP presenting with dyspnea on exertion and admitted for further workup. Admission labs with neg troponin, Cr within bl range at 1.91, hg within baseline at 12.9. EKG nonischemic and CTPE without evidence of PE or pulmonary pathology but with mildly enlarged main PA. Low concern for ACS given neg trop and nonischemic ECG and low c/f infectious or acute pulmonary process given neg CT findings. Highest suspicion is for HF exacerbation given worsening mai and tachycardia but pt clinically euvolemic on exam.   BNP was normal. TTE showed normal LVEF of 55%, there is reduced right ventricular systolic function, suboptimal agitated saline contrast study did not identify any obvious intracardiac shunt. Pt symptoms improved and he will be discharged home with recommendations as above \"    EKG at the ED showed atrial fibrillation with RVR.  Patient's symptoms he reports are lightheadedness and significant shortness of breath with exertion.  Have remained unchanged.  Feels okay at rest, but very fatigued and put out when doing his routine exercises or walking upstairs.  Was not put on anticoagulation during hospitalization.    General: Alert, oriented, pleasant, in no acute distress  HEENT:      Head: normocephalic, atraumatic;      eyes: EOMI, no scleral icterus;   Neck: soft, supple, non-tender, no masses appreciated  CV: Irregularly irregular rhythm, normal S1/S2, no murmurs  Lungs: CTAB without wheezing, rhonchi or rales; good respiratory effort, no increased work of breathing  Neuro: Cranial nerves grossly intact; alert and oriented  Psych: Appropriate mood and affect from    #HTN #CHF #atrial fibrillation  - BP at goal in office, vital signs stable  - Currently on amlodipine 10 mg daily, chlorthalidone 25 mg daily, hydralazine 25 mg " twice daily, metoprolol succinate 100 mg daily, isosorbide dinitrate 10 mg twice daily  -EKG showed A-fib without rapid ventricular response  - Will start on Eliquis 5 mg twice daily, given samples today, is rate controlled, already on beta-blocker, will not make any adjustments today will follow-up with cardiology tomorrow    F/U 6 months, sooner if indicated, CPE at that time    Chris D'Amico, DO

## 2025-04-15 ENCOUNTER — OFFICE VISIT (OUTPATIENT)
Dept: CARDIOLOGY | Facility: HOSPITAL | Age: 60
End: 2025-04-15
Payer: COMMERCIAL

## 2025-04-15 VITALS
HEIGHT: 72 IN | SYSTOLIC BLOOD PRESSURE: 139 MMHG | WEIGHT: 315 LBS | OXYGEN SATURATION: 99 % | DIASTOLIC BLOOD PRESSURE: 88 MMHG | BODY MASS INDEX: 42.66 KG/M2 | HEART RATE: 88 BPM

## 2025-04-15 DIAGNOSIS — G47.33 OBSTRUCTIVE SLEEP APNEA: ICD-10-CM

## 2025-04-15 DIAGNOSIS — I50.30 HEART FAILURE WITH PRESERVED EJECTION FRACTION, UNSPECIFIED HF CHRONICITY: ICD-10-CM

## 2025-04-15 DIAGNOSIS — I10 HTN (HYPERTENSION), BENIGN: ICD-10-CM

## 2025-04-15 DIAGNOSIS — N18.30 STAGE 3 CHRONIC KIDNEY DISEASE, UNSPECIFIED WHETHER STAGE 3A OR 3B CKD (MULTI): ICD-10-CM

## 2025-04-15 DIAGNOSIS — I50.30 HEART FAILURE WITH PRESERVED EJECTION FRACTION, UNSPECIFIED HF CHRONICITY: Primary | ICD-10-CM

## 2025-04-15 DIAGNOSIS — I48.92 ATRIAL FLUTTER, UNSPECIFIED TYPE (MULTI): Primary | ICD-10-CM

## 2025-04-15 DIAGNOSIS — E11.69 TYPE 2 DIABETES MELLITUS WITH OTHER SPECIFIED COMPLICATION, WITHOUT LONG-TERM CURRENT USE OF INSULIN: ICD-10-CM

## 2025-04-15 PROCEDURE — 93005 ELECTROCARDIOGRAM TRACING: CPT | Performed by: INTERNAL MEDICINE

## 2025-04-15 PROCEDURE — 1036F TOBACCO NON-USER: CPT | Performed by: INTERNAL MEDICINE

## 2025-04-15 PROCEDURE — 3075F SYST BP GE 130 - 139MM HG: CPT | Performed by: INTERNAL MEDICINE

## 2025-04-15 PROCEDURE — 99215 OFFICE O/P EST HI 40 MIN: CPT | Performed by: INTERNAL MEDICINE

## 2025-04-15 PROCEDURE — 99214 OFFICE O/P EST MOD 30 MIN: CPT | Performed by: INTERNAL MEDICINE

## 2025-04-15 PROCEDURE — 4010F ACE/ARB THERAPY RXD/TAKEN: CPT | Performed by: INTERNAL MEDICINE

## 2025-04-15 PROCEDURE — 3008F BODY MASS INDEX DOCD: CPT | Performed by: INTERNAL MEDICINE

## 2025-04-15 PROCEDURE — 3079F DIAST BP 80-89 MM HG: CPT | Performed by: INTERNAL MEDICINE

## 2025-04-15 PROCEDURE — 93010 ELECTROCARDIOGRAM REPORT: CPT | Performed by: INTERNAL MEDICINE

## 2025-04-15 PROCEDURE — RXMED WILLOW AMBULATORY MEDICATION CHARGE

## 2025-04-15 RX ORDER — HYDRALAZINE HYDROCHLORIDE 50 MG/1
50 TABLET, FILM COATED ORAL 2 TIMES DAILY
Qty: 60 TABLET | Refills: 11 | Status: SHIPPED | OUTPATIENT
Start: 2025-04-15 | End: 2026-04-15

## 2025-04-15 ASSESSMENT — ENCOUNTER SYMPTOMS: SHORTNESS OF BREATH: 1

## 2025-04-15 ASSESSMENT — PAIN SCALES - GENERAL: PAINLEVEL_OUTOF10: 0-NO PAIN

## 2025-04-15 NOTE — PROGRESS NOTES
"    Cardiac Electrophysiology Office Visit   I had the pleasure seeing Shola Caraballo    Current Outpatient Medications   Medication Instructions    allopurinol (ZYLOPRIM) 300 mg, oral, Daily    amLODIPine (NORVASC) 2.5 mg, oral, Daily    apixaban (ELIQUIS) 5 mg, 2 times daily    ascorbic acid (VITAMIN C) 500 mg, Daily    blood sugar diagnostic (Accu-Chek Guide test strips) strip Use to test blood sugar twice daily.    bumetanide (BUMEX) 2 mg, oral, Daily    calcitriol (ROCALTROL) 0.25 mcg, oral, 3 times weekly    chlorthalidone (Hygroton) 25 mg tablet Take 1 tablet by mouth daily    colchicine 0.6 mg tablet Take 1 tablet (0.6 mg) by mouth once daily as needed for gout flare.    dapagliflozin propanediol (Farxiga) 10 mg TAKE 1 TABLET BY MOUTH EVERY MORNING    enzymes,digestive (DIGESTIVE ENZYMES ORAL) 1 capsule, Daily PRN    glyBURIDE (Diabeta) 5 mg tablet TAKE 2 TABLETS BY MOUTH TWO TIMES A DAY BEFORE BREAKFAST AND SUPPER. NEED TO MAKE APPOINTMENT WITH DOCTOR.    hydrALAZINE (APRESOLINE) 25 mg, oral, 2 times daily    insulin degludec (TRESIBA FLEXTOUCH) 20 Units, subcutaneous, Nightly, Take as directed per insulin instructions.    isosorbide dinitrate (ISORDIL) 10 mg, oral, Daily    lancets misc USE TO TEST BLOOD SUGAR TWICE DAILY    lisinopril 40 mg tablet TAKE 1 TABLET BY MOUTH ONCE DAILY    metoprolol succinate XL (Toprol-XL) 100 mg 24 hr tablet TAKE 1 TABLET BY MOUTH ONCE DAILY    multivitamin with minerals (DAILY VITAMIN FORMULA-MINERALS) tablet 1 tablet DAILY (route: oral)    pantoprazole (PROTONIX) 40 mg, oral, Daily    pen needle, diabetic (BD Karine 2nd Gen Pen Needle) 32 gauge x 5/32\" needle Use with insulin daily    Trulicity 0.75 mg, subcutaneous, Once Weekly      Subjective    Shola Caraballo is a 59 y.o. male .        Chief Complaint   Patient presents with    Heart Failure     Heart failure with preserved EF      OUTPATIENT CONSULTATION: Cardiac Electrophysiology  DOS: 04/15/2025   REASON:  "   REFERRING: Alecia Whalen MD     SUZANNA Caraballo has a past medical history of CKD, HTN, DM T2, HLD, PVD, RODDY, Obesity   CARDIAC HISTORY:  HFpEF   PACs    RELEVANT TESTING:     Transthoracic Echo (TTE) Complete 04/09/2025   CONCLUSIONS:   1. Poorly visualized anatomical structures due to suboptimal image quality.   2. Left ventricular ejection fraction is normal, calculated by Dinh's biplane at 55%.   3. There is reduced right ventricular systolic function.   4. Suboptimal agitated saline contrast study did not identify any obvious intracardiac shunt.   5. Mildly elevated right ventricular systolic pressure.   6. Compared with study dated 2/25/2023, Both studies were technically difficult, however there has been a decrease in the RVS' and TAPSE consistent with reduced RV systolic function. Those parameters were normal on the prior exam. RVSP could not be estimated on the prior exam.    Lab Review:   Lab Results   Component Value Date    WBC 9.8 04/12/2025    HGB 11.9 (L) 04/12/2025    HCT 36.3 (L) 04/12/2025     04/12/2025    CHOL 133 03/19/2025    TRIG 298 (H) 03/19/2025    HDL 27 (L) 03/19/2025    ALT 22 04/12/2025    AST 28 04/12/2025     04/12/2025    K 3.9 04/12/2025     04/12/2025    CREATININE 1.45 (H) 04/12/2025    BUN 24 (H) 04/12/2025    CO2 30 04/12/2025    TSH 2.00 03/19/2025    PSA 1.79 03/19/2025    INR 1.1 05/13/2023    HGBA1C 7.1 (H) 03/19/2025    ALBUR 73.5 08/02/2021       Review of Systems   Respiratory:  Positive for shortness of breath.    All other systems reviewed and are negative.       Objective    Physical Exam       Assessment/Plan   Atrial Flutter - possibly atypical . On the basis of sleep apnea, obesity, heart failure with preserved EF.     PLAN: Discussed with him the atrial flutter. Rhythm control is preferable . We discussed the options we will proceed with EPS, RFA on Magi 3, 2025.

## 2025-04-15 NOTE — PROGRESS NOTES
Chief complaint: Follow up visit   History of Present Illness  Mr Caraballo is a 58 year old man with a PMH significant for HFpEF,CKD  , HTN , DM , morbid obesity  , RODDY on CPAP .he was admitted to [facility] end of February 2023 secondary to a left diabetic foot ulcer /cellulitis for which he underwent incision and drainage and bone biopsy he also had Bacteremia with Strep B , ADHF and TROY   Metoprolol was added to his regimen secondary to Premature beats at that time .   He was admitted beginning April secondary to new onset Shortness of breath on exertion which prompted his presentation to the hospital - at that time his EKGs did show Aflutter with variable block and Afib with RVR ,unclear if that was noted during his admission by the team  He underwent a CT PE protocol which was negative for PE but showed moderate coronary calcifications   Echocardiogram was performed and his EF was read as 55% with a  reduced RV function   Troponins were normal   BNP was normal   After discharge he was seen by his PCP yesterday and his EKGs reviewed and eliquis was prescribed  Currently he denies having any chest pain or pressure , feels overall improved compared to his admission but remains short of breath on exertion , no palpitations , no syncope , no dizziness , + leg edema , reports continued compliance with his CPAP .      Social Hx: Denies smoking, vaping, ETOH, Illicit drug use.        Hospitalizations: 4/8-4/10/25 SOB; ED 4/12/25 for abdominal cramping              CT PE protocol ( 4/9/2025)    1. No evidence of acute pulmonary embolism or acute cardiopulmonary  abnormality.  2. Mildly enlarged main pulmonary artery measuring 3.2 cm, correlate  with concern for pulmonary arterial hypertension.  3. Moderate coronary artery atherosclerotic calcifications.  Hepatomegaly and hepatic steatosis.    Echocardiogram ( 04/2025)    1. Poorly visualized anatomical structures due to suboptimal image quality.   2. Left ventricular ejection  fraction is normal, calculated by Dinh's biplane at 55%.   3. There is reduced right ventricular systolic function.   4. Suboptimal agitated saline contrast study did not identify any obvious intracardiac shunt.   5. Mildly elevated right ventricular systolic pressure.   6. Compared with study dated 2/25/2023, Both studies were technically difficult, however there has been a decrease in the RVS' and TAPSE consistent with reduced RV systolic function. Those parameters were normal on the prior exam. RVSP could not be estimated on the prior exam.    February 2023 Echocardiogram   1. Left ventricular systolic function is normal with a 55-60% estimated ejection fraction.  2. Poorly visualized anatomical structures due to suboptimal image quality.            Home Monitoring: The patient checks his weight regularly.   Lifestyle: Diet: He consumes a diverse and healthy diet.Exercise: He does not exercise regularly.Smoking: He does not use tobacco.Alcohol: He denies alcohol use.Drug Use: He denies drug use.   Medications: the patient is adherent with his medication regimen.      *Active Problems   Problems    · (HFpEF) heart failure with preserved ejection fraction (428.9) (I50.30)   · Arthritis (716.90) (M19.90)   · Astigmatism, bilateral (367.20) (H52.203)   · Bilateral presbyopia (367.4) (H52.4)   · Cataract associated with type 2 diabetes mellitus (250.50,366.41) (E11.36)   · Cellulitis of foot (682.7) (L03.119)   · Cough (786.2) (R05.9)   · COVID-19 virus detected (079.89) (U07.1)   · COVID-19 virus infection (079.89) (U07.1)   · Diabetes mellitus type 2 without retinopathy (250.00) (E11.9)   · Diabetic retinopathy (250.50,362.01) (E11.319)   · DM type 2, uncontrolled, with renal complications (250.42)   · Elevated parathyroid hormone (259.9) (E34.9)   · Foot osteomyelitis, left (730.27) (M86.9)   · Gout (274.9) (M10.9)   · Added by Problem List Migration; 2013-6-8; Moved to [facility] Nov 23 2013  4:10PM   · History  of morbid obesity (V13.89) (Z86.39)   · HTN (hypertension), benign (401.1) (I10)   · Hyperlipidemia (272.4) (E78.5)   · Added by Problem List Migration; 2013-6-6; Moved to Suppressed Nov 23 2013  4:10PM   · Immunization due (V05.9) (Z23)   · Incisional hernia (553.21) (K43.2)   · Low HDL (under 40) (272.5) (E78.6)   · Low vitamin D level (790.6) (R79.89)   · Nasal congestion with rhinorrhea (478.19) (R09.81,J34.89)   · Nuclear sclerosis of both eyes (366.16) (H25.13)   · Obstructive sleep apnea (327.23) (G47.33)   · Onychomycosis of toenail (110.1) (B35.1)   · Peripheral vascular disease (443.9) (I73.9)   · Prerenal azotemia (790.6) (R79.89)   · Pressure ulcer of dorsum of left foot (707.09,707.20) (L89.899)   · Refraction error (367.9) (H52.7)   · Sickle cell trait (282.5) (D57.3)   · Stage III chronic kidney disease (585.3) (N18.30)   · Added by Problem List Migration; 2013-6-8   · Ulcer of right leg (707.10) (L97.919)   · Uncontrolled hypertension (401.9) (I10)   · Added by Problem List Migration; 2013-6-6    Left-sided low back pain with left-sided sciatica (724.3) (M54.42)     Surgical History  Problems    · History of Appendectomy   · History of Hip replacement   · History of Ventral hernia repair    Past Medical History  Problems    · History of Acute gout (274.01) (M10.9)   · Resolved Date: 06 Oct 2021   · History of Acute osteomyelitis (730.00) (M86.10)   · Added by Problem List Migration; 2013-6-8   · History of Callus (700) (L84)   · Resolved Date: 13 Feb 2015   · History of Chronic combined systolic and diastolic congestive heart failure  (428.42,428.0) (I50.42)   · Resolved Date: 09 Jan 2020   · Diagnosed during admission 2008 with volume overload.  LVEF 45-50%.      9/2010: Preserved EF60-65%, LV mildly dilated with LVH, LAE   · History of Chronic Osteomyelitis Of The Toes (730.10)   · Resolved Date: 27 Jul 2020   · History of Chronic renal insufficiency (585.9) (N18.9)   · Resolved Date: 13 Feb 2015    · History of Chronic venous hypertension w ulceration, right   · Resolved Date: 09 Jan 2020   · History of Chronic venous hypertension with inflammation involving both sides (459.32)  (I87.323)   · Resolved Date: 09 Jan 2020   · History of Contusion of toe (924.3) (S90.129A)   · Resolved Date: 06 Oct 2021   · History of Hand swelling (729.81) (M79.89)   · Resolved Date: 28 Jul 2021   · History of Hand swelling (729.81) (M79.89)   · Resolved Date: 28 Jul 2021   · History of anemia (V12.3) (Z86.2)   · Resolved Date: 27 Jul 2020   · History of congestive heart disease (V12.59) (Z86.79)   · Added by Problem List Migration; 2013-6-6   · History of hyperlipidemia (V12.29) (Z86.39)   · Resolved Date: 13 Feb 2015   · History of morbid obesity (V13.89) (Z86.39)   · Resolved Date: 27 Jul 2020   · History of rectal bleeding (V12.79) (Z87.19)   · Resolved Date: 28 Jul 2021   · History of Injury of toe, initial encounter (959.7) (S99.929A)   · Resolved Date: 28 Jul 2021   · History of Left foot pain (729.5) (M79.672)   · Resolved Date: 28 Jul 2021   · History of MSSA (methicillin susceptible Staphylococcus aureus) (041.11) (A49.01)   · Resolved Date: 28 Jul 2021   · History of Paronychia of toe (681.11) (L03.039)   · Resolved Date: 06 Oct 2021   · History of Pseudomonas infection (041.7) (A49.8)   · Resolved Date: 28 Jul 2021   · History of Sciatica of left side (724.3) (M54.32)   · Resolved Date: 28 Jul 2021   · Uncontrolled hypertension (401.9) (I10)   · Added by Problem List Migration; 2013-6-6    Current Meds    Current Outpatient Medications   Medication Instructions    allopurinol (ZYLOPRIM) 300 mg, oral, Daily    amLODIPine (NORVASC) 2.5 mg, oral, Daily    ascorbic acid (VITAMIN C) 500 mg, Daily    blood sugar diagnostic (Accu-Chek Guide test strips) strip Use to test blood sugar twice daily.    bumetanide (BUMEX) 2 mg, oral, Daily    calcitriol (ROCALTROL) 0.25 mcg, oral, 3 times weekly    chlorthalidone (Hygroton) 25 mg  "tablet Take 1 tablet by mouth daily    colchicine 0.6 mg tablet Take 1 tablet (0.6 mg) by mouth once daily as needed for gout flare.    dapagliflozin propanediol (Farxiga) 10 mg TAKE 1 TABLET BY MOUTH EVERY MORNING    enzymes,digestive (DIGESTIVE ENZYMES ORAL) 1 capsule, Daily PRN    glyBURIDE (Diabeta) 5 mg tablet TAKE 2 TABLETS BY MOUTH TWO TIMES A DAY BEFORE BREAKFAST AND SUPPER. NEED TO MAKE APPOINTMENT WITH DOCTOR.    hydrALAZINE (APRESOLINE) 25 mg, oral, 2 times daily    insulin degludec (TRESIBA FLEXTOUCH) 20 Units, subcutaneous, Nightly, Take as directed per insulin instructions.    isosorbide dinitrate (ISORDIL) 10 mg, oral, Daily    lancets misc USE TO TEST BLOOD SUGAR TWICE DAILY    lisinopril 40 mg tablet TAKE 1 TABLET BY MOUTH ONCE DAILY    metoprolol succinate XL (Toprol-XL) 100 mg 24 hr tablet TAKE 1 TABLET BY MOUTH ONCE DAILY    multivitamin with minerals (DAILY VITAMIN FORMULA-MINERALS) tablet 1 tablet DAILY (route: oral)    pantoprazole (PROTONIX) 40 mg, oral, Daily    pen needle, diabetic (BD Karine 2nd Gen Pen Needle) 32 gauge x 5/32\" needle Use with insulin daily    Trulicity 0.75 mg, subcutaneous, Once Weekly           · No Known Allergies  Updated By: Pat Walters; 7/4/2015 10:45:41 AM    Family History  Mother    · Family history of hypertension (V17.49) (Z82.49)  Father    · Family history of hypertension (V17.49) (Z82.49)  Maternal Relatives    · Family history of hypertension (V17.49) (Z82.49)   · Family history of Type 2 diabetes mellitus with chronic kidney disease, with long-term  current use of insulin, unspecified CKD stage  Paternal Relatives    · Family history of hypertension (V17.49) (Z82.49)   · Family history of Type 2 diabetes mellitus with chronic kidney disease, with long-term  current use of insulin, unspecified CKD stage  Other    · No pertinent family history    Social History  Problems    · Ex-cigarette smoker (V15.82) (Z87.891)   · Feels safe at home   · No illicit drug " use   · Non-smoker (V49.89) (Z78.9)   · Occasional alcohol use    Review of Systems    Constitutional: as noted in HPI.   Cardiovascular: as noted in HPI.   Respiratory: as noted in HPI.   Neurological: as noted in HPI.      Vitals  Vitals:    04/15/25 0832   BP: 139/88   Pulse: 88   SpO2: 99%            Physical Exam  GEN: NAD , AOX3  HEENT : JVP at 1 cm above the clavicle in a sitting position    Heart : regular rhythm , normal S1 and S2 , no murmurs   Lungs : clear , resonant , normal air entry bilaterally   Abdomen : soft , non tender   Ext: warm , well perfused , +1 pitting edema bilaterally   Neuro : grossly intact           A/P  Mr Caraballo is a 59 year old man with a PMH significant for HFpEF,CKD , HTN, DM, RODDY on CPAP , morbid obesity who is here for a post hospitalization follow up visit   New onset Afib /flutter   Heart rate of 100 today in clinic , Aflutter   Hypertensive and hypervolemic   LV EF appears slightly lower than prior and his RV function is reduced ( overall poor quality echocardiograms secondary to his body habitus)    - Will increase hydralazine to 50 mg BID ( has been historically reluctant to alter his antihypertensive regimen)   - He was holding his Bumex , advised him to resume his bumex 2 mg Daily   - Referred him to Dr Stanton who saw him today and shared decision made to proceed with ablation after 4 weeks of anticoagulation   - Will follow up with him again in 3 months

## 2025-04-15 NOTE — PATIENT INSTRUCTIONS
90-95% To reach Dr. Villaseñor's office please call [phone] ([place]). Fax [phone]. Call [phone] to schedule an appointment. You may also contact the HF RNs at [email or web address]    Thank you for coming to your appointment today. If you have any questions or need cardiac medication refills, please call the Heart Failure Office at [phone] option [phone].     INCREASE Hydralazine to 50mg twice a day  Dr. Stanton will see you today and determine the next steps

## 2025-04-16 ENCOUNTER — PHARMACY VISIT (OUTPATIENT)
Dept: PHARMACY | Facility: CLINIC | Age: 60
End: 2025-04-16
Payer: COMMERCIAL

## 2025-04-16 LAB
ATRIAL RATE: 110 BPM
PR INTERVAL: 240 MS
Q ONSET: 219 MS
QRS COUNT: 16 BEATS
QRS DURATION: 86 MS
QT INTERVAL: 340 MS
QTC CALCULATION(BAZETT): 445 MS
QTC FREDERICIA: 407 MS
R AXIS: -15 DEGREES
T AXIS: 6 DEGREES
T OFFSET: 389 MS
VENTRICULAR RATE: 103 BPM

## 2025-04-17 DIAGNOSIS — N17.9 AKI (ACUTE KIDNEY INJURY): ICD-10-CM

## 2025-04-18 ENCOUNTER — APPOINTMENT (OUTPATIENT)
Dept: SLEEP MEDICINE | Facility: CLINIC | Age: 60
End: 2025-04-18
Payer: COMMERCIAL

## 2025-04-18 VITALS
TEMPERATURE: 98.3 F | SYSTOLIC BLOOD PRESSURE: 105 MMHG | HEIGHT: 72 IN | BODY MASS INDEX: 42.66 KG/M2 | WEIGHT: 315 LBS | DIASTOLIC BLOOD PRESSURE: 72 MMHG | HEART RATE: 108 BPM

## 2025-04-18 DIAGNOSIS — G47.30 SLEEP APNEA, UNSPECIFIED TYPE: ICD-10-CM

## 2025-04-18 DIAGNOSIS — G47.33 OBSTRUCTIVE SLEEP APNEA: Primary | ICD-10-CM

## 2025-04-18 DIAGNOSIS — I10 HYPERTENSION, UNSPECIFIED TYPE: ICD-10-CM

## 2025-04-18 PROCEDURE — 99244 OFF/OP CNSLTJ NEW/EST MOD 40: CPT | Performed by: STUDENT IN AN ORGANIZED HEALTH CARE EDUCATION/TRAINING PROGRAM

## 2025-04-18 PROCEDURE — 3074F SYST BP LT 130 MM HG: CPT | Performed by: STUDENT IN AN ORGANIZED HEALTH CARE EDUCATION/TRAINING PROGRAM

## 2025-04-18 PROCEDURE — 3008F BODY MASS INDEX DOCD: CPT | Performed by: STUDENT IN AN ORGANIZED HEALTH CARE EDUCATION/TRAINING PROGRAM

## 2025-04-18 PROCEDURE — 3078F DIAST BP <80 MM HG: CPT | Performed by: STUDENT IN AN ORGANIZED HEALTH CARE EDUCATION/TRAINING PROGRAM

## 2025-04-18 PROCEDURE — 1036F TOBACCO NON-USER: CPT | Performed by: STUDENT IN AN ORGANIZED HEALTH CARE EDUCATION/TRAINING PROGRAM

## 2025-04-18 PROCEDURE — 4010F ACE/ARB THERAPY RXD/TAKEN: CPT | Performed by: STUDENT IN AN ORGANIZED HEALTH CARE EDUCATION/TRAINING PROGRAM

## 2025-04-18 ASSESSMENT — SLEEP AND FATIGUE QUESTIONNAIRES
HOW LIKELY ARE YOU TO NOD OFF OR FALL ASLEEP WHILE SITTING AND TALKING TO SOMEONE: WOULD NEVER DOZE
HOW LIKELY ARE YOU TO NOD OFF OR FALL ASLEEP WHILE LYING DOWN TO REST IN THE AFTERNOON WHEN CIRCUMSTANCES PERMIT: WOULD NEVER DOZE
HOW LIKELY ARE YOU TO NOD OFF OR FALL ASLEEP IN A CAR, WHILE STOPPED FOR A FEW MINUTES IN TRAFFIC: WOULD NEVER DOZE
SITING INACTIVE IN A PUBLIC PLACE LIKE A CLASS ROOM OR A MOVIE THEATER: WOULD NEVER DOZE
SLEEP_PROBLEM_NOTICEABLE_TO_OTHERS: A LITTLE
HOW LIKELY ARE YOU TO NOD OFF OR FALL ASLEEP WHILE SITTING QUIETLY AFTER LUNCH WITHOUT ALCOHOL: WOULD NEVER DOZE
SLEEP_PROBLEM_INTERFERES_DAILY_ACTIVITIES: A LITTLE
ESS-CHAD TOTAL SCORE: 0
WORRIED_DISTRESSED_DUE_TO_SLEEP: A LITTLE
SATISFACTION_WITH_CURRENT_SLEEP_PATTERN: VERY SATISFIED
HOW LIKELY ARE YOU TO NOD OFF OR FALL ASLEEP WHILE WATCHING TV: WOULD NEVER DOZE
HOW LIKELY ARE YOU TO NOD OFF OR FALL ASLEEP WHEN YOU ARE A PASSENGER IN A CAR FOR AN HOUR WITHOUT A BREAK: WOULD NEVER DOZE
HOW LIKELY ARE YOU TO NOD OFF OR FALL ASLEEP WHILE SITTING AND READING: WOULD NEVER DOZE

## 2025-04-18 NOTE — PROGRESS NOTES
Patient: Shola Caraballo    [id number]  : 1965 -- AGE 59 y.o.    Provider: Wilfredo Rosario MD     Location [facility]   Service Date: 2025              [facility]  New Visit Note     ASSESSMENT AND PLAN   Mr. Caraballo is a 59 y.o. male and he returns in followup to the [facility] for the problems listed below on 25     Problem List, Orders, Assessment, Recommendations:    Obstructive Sleep Apnea (RODDY)  Long-standing RODDY managed with CPAP since . Seeks new CPAP due to aging equipment. Compliant with therapy. Prefers home sleep apnea test.  - Order home sleep apnea test to confirm ongoing RODDY.  - Instruct him to refrain from using CPAP during the night of the sleep study for accurate results.  - Coordinate with [facility] for device pickup for the home sleep apnea test.  - Interpret sleep study results to confirm RODDY and facilitate ordering a new CPAP machine. (REPAP)  - Schedule follow-up appointment 3-4 months after receiving the new CPAP machine to check compliance.    Obesity  BMI Readings from Last 1 Encounters:   25 43.70 kg/m²   - encouraged healthy weight loss via diet and exercise    HTN  BP Readings from Last 1 Encounters:   25 105/72     - doing well, asymptomatic  - discussed at length the impact of untreated RODDY and BP control  - continue current management and follow-up with PCP    Disposition  Will call with sleep study results and determine F/U plan        HISTORY OF PRESENT ILLNESS     The patient's referring provider is: Celina Delatorre, *; Christopher D'Amico, DO    HISTORY OF PRESENT ILLNESS   Shola Caraballo is a 59 y.o. male with history of Hypertension, Obesity, and RODDY presents to [facility] for a sleep medicine evaluation with concerns of New Patient Visit (Sleep apnea).     Past Sleep History  Patient has the following sleep-related diagnoses:  obstructive sleep apnea.   Current History    History of Present Illness  He has been using a CPAP machine since 2008 and is seeking a replacement due to the age of his current machine. Over the years, he has used two machines, with the current one being silver in color. He no longer experiences symptoms that were present before using the CPAP, as indicated by a questionnaire he completed.    He has not frequently visited a sleep doctor, having only been to one at the main campus twice, and does not recall the doctor's name. His previous sleep study was conducted at , where he stayed overnight.    He is currently covered by [employer] insurance, which has been providing supplies for his CPAP machine, although he is unsure of the specific medical equipment company involved. He mentions that his machine is getting old, prompting the need for an update.    He resides in [place] and has no preference between picking up the new device from [place] or [facility], although he agrees to [facility] for convenience.    Patient is currently being treated with PAP therapy(Optional): PAP Adherence : Not available to review in clinic today, will contact DME to retrieve report after the visit and communicate with patient if clinically indicated  Issues with PAP Therapy?: None  Perceived Benefits of PAP therapy: refreshing sleep    Sleep schedule:      Weekdays / Work Days Weekends / Days Off   Bedtime 12 am  same as weekdays   Sleep latency 10 min same as weekdays   Wake time 8 am  same as weekdays   Perceived Total sleep time  Average/day:  8 hours/day Same as weekdays   Naps Napping habit: Patient denies taking naps.     Preferred sleeping position: supine and sidelying    Sleep-related ROS:    Sleep Initiation: Patient: has no problems going to sleep    Sleep Maintenance: Sleep Maintenance: denies problematic awakenings while on CPAP    Breathing during sleep: no symptoms of snoring or concerns of breathing  at night while on CPAP    RLS screen:  - Patient does not have unusual sensations in their extremities that cause an urge to move them.   Daytime Symptoms  On awakening patient reports: no morning symptoms while on CPAP    Patient reports: no daytime symptoms  Patient denies: excessive daytime sleepiness  Fatigue concerns: Patient denies feeling fatigue    ESS: 0  RYAN: 3  FOSQ: 40    Sleep Disorder Specific Review of Social History  Shola does not have a family history of sleep disorder.   He  reports that he has quit smoking. His smoking use included cigarettes. He has been exposed to tobacco smoke. He has never used smokeless tobacco. He reports that he does not currently use alcohol. He reports that he does not currently use drugs. Shola currently lives with his wife    Caffeine consumption: No  Alcohol consumption: Yes, Patient does consume alcohol, but not on a regular basis  Smoking: No  Marijuana: No    ALLERGIES AND MEDICATIONS   ALLERGIES  Allergies[1]    MEDICATIONS  Current Medications[2]      PAST HISTORY     PAST MEDICAL HISTORY  He  has a past medical history of Cellulitis of unspecified toe (09/05/2013), Chronic combined systolic (congestive) and diastolic (congestive) heart failure (01/09/2020), Chronic kidney disease, unspecified (06/09/2014), Chronic venous hypertension (idiopathic) with inflammation of bilateral lower extremity (04/30/2019), Contusion of unspecified lesser toe(s) without damage to nail, initial encounter (11/12/2013), Corns and callosities (01/10/2014), Essential (primary) hypertension (03/09/2022), Gout, unspecified (02/03/2021), Methicillin susceptible Staphylococcus aureus infection, unspecified site (11/22/2019), Other acute osteomyelitis, unspecified site (09/03/2013), Other bacterial infections of unspecified site (11/22/2019), Other conditions influencing health status (04/02/2019), Other conditions influencing health status (12/10/2013), Other specified soft tissue  disorders (08/10/2015), Other specified soft tissue disorders (10/08/2020), Pain in left foot (03/04/2016), Personal history of diseases of the blood and blood-forming organs and certain disorders involving the immune mechanism (10/18/2016), Personal history of other diseases of the circulatory system (10/30/2013), Personal history of other diseases of the digestive system (12/08/2015), Personal history of other endocrine, nutritional and metabolic disease (02/13/2015), Personal history of other endocrine, nutritional and metabolic disease (07/27/2020), Sciatica, left side (11/11/2016), and Unspecified injury of unspecified foot, initial encounter (04/02/2019).    PAST SURGICAL HISTORY:  Surgical History[3]    FAMILY HISTORY  Family History[4]          PHYSICAL EXAM     VITAL SIGNS: /72 (BP Location: Right arm, Patient Position: Sitting, BP Cuff Size: Adult)   Pulse 108   Temp 36.8 °C (98.3 °F) (Oral)   Ht 1.829 m (6')   Wt 146 kg (322 lb 3.2 oz)   BMI 43.70 kg/m²      CURRENT WEIGHT:   Vitals:    04/18/25 1024   Weight: 146 kg (322 lb 3.2 oz)     Body mass index is 43.7 kg/m².  PREVIOUS WEIGHTS:  Wt Readings from Last 3 Encounters:   04/18/25 146 kg (322 lb 3.2 oz)   04/15/25 145 kg (320 lb)   04/12/25 145 kg (320 lb)       PHYSICAL EXAM: MODIFIED MALLAMPATI SCORE: III (soft and hard palate and base of uvula visible)  TONGUE SCALLOPING: with scalloping      RESULTS/DATA     CARBON DIOXIDE (mmol/L)   Date Value   03/19/2025 26     Bicarbonate (mmol/L)   Date Value   04/12/2025 30   04/10/2025 25   04/08/2025 32     Iron (ug/dL)   Date Value   12/16/2024 66   03/11/2024 53   06/16/2023 29 (L)   02/06/2020 10 (L)   09/08/2019 32 (L)     % Saturation (%)   Date Value   12/16/2024 21 (L)   03/11/2024 16 (L)     Iron Saturation (%)   Date Value   06/16/2023 11 (L)   02/06/2020 6 (L)   09/08/2019 12 (L)     TIBC (ug/dL)   Date Value   12/16/2024 319   03/11/2024 323   06/16/2023 269   02/06/2020 166 (L)    09/08/2019 271     Ferritin   Date Value   12/16/2024 264 ng/mL   03/11/2024 255 ng/mL   06/16/2023 235 ug/L   02/06/2020 424 ug/L (H)   09/08/2019 203 ug/L          [1] No Known Allergies  [2]   Current Outpatient Medications   Medication Sig Dispense Refill    allopurinol (Zyloprim) 300 mg tablet Take 1 tablet (300 mg) by mouth once daily. 90 tablet 3    amLODIPine (Norvasc) 2.5 mg tablet Take 1 tablet (2.5 mg) by mouth once daily. 30 tablet 5    apixaban (Eliquis) 5 mg tablet Take 1 tablet (5 mg) by mouth 2 times a day. 60 tablet 11    blood sugar diagnostic (Accu-Chek Guide test strips) strip Use to test blood sugar twice daily. 100 strip 3    bumetanide (Bumex) 2 mg tablet Take 1 tablet (2 mg) by mouth once daily. 90 tablet 3    calcitriol (Rocaltrol) 0.25 mcg capsule Take 1 capsule (0.25 mcg) by mouth 3 times a week. 36 capsule 3    chlorthalidone (Hygroton) 25 mg tablet Take 1 tablet by mouth daily 90 tablet 3    colchicine 0.6 mg tablet Take 1 tablet (0.6 mg) by mouth once daily as needed for gout flare. 10 tablet 1    dapagliflozin propanediol (Farxiga) 10 mg TAKE 1 TABLET BY MOUTH EVERY MORNING 90 tablet 1    hydrALAZINE (Apresoline) 50 mg tablet Take 1 tablet (50 mg) by mouth 2 times a day. 60 tablet 11    insulin degludec (Tresiba FlexTouch) 100 unit/mL (3 mL) pen Inject 20 Units under the skin once daily at bedtime. Take as directed per insulin instructions. 18 mL 1    isosorbide dinitrate (Isordil) 10 mg tablet Take 1 tablet (10 mg) by mouth once daily. 90 tablet 3    lancets misc USE TO TEST BLOOD SUGAR TWICE DAILY 100 each 2    lisinopril 40 mg tablet TAKE 1 TABLET BY MOUTH ONCE DAILY 90 tablet 3    metoprolol succinate XL (Toprol-XL) 100 mg 24 hr tablet TAKE 1 TABLET BY MOUTH ONCE DAILY 90 tablet 3    multivitamin with minerals (DAILY VITAMIN FORMULA-MINERALS) tablet 1 tablet DAILY (route: oral)      pantoprazole (ProtoNix) 40 mg EC tablet Take 1 tablet (40 mg) by mouth once daily. 90 tablet 3     "pen needle, diabetic (BD Karine 2nd Gen Pen Needle) 32 gauge x 5/32\" needle Use with insulin daily 100 each 1    ascorbic acid (Vitamin C) 1,000 mg tablet Take 0.5 tablets (500 mg) by mouth once daily.      dulaglutide (Trulicity) 0.75 mg/0.5 mL pen injector Inject 0.75 mg under the skin 1 (one) time per week. (Patient not taking: Reported on 4/14/2025) 6 mL 1    enzymes,digestive (DIGESTIVE ENZYMES ORAL) Take 1 capsule by mouth once daily as needed (stomach upset). (Patient not taking: Reported on 4/15/2025)      glyBURIDE (Diabeta) 5 mg tablet TAKE 2 TABLETS BY MOUTH TWO TIMES A DAY BEFORE BREAKFAST AND SUPPER. NEED TO MAKE APPOINTMENT WITH DOCTOR. 360 tablet 0     No current facility-administered medications for this visit.   [3]   Past Surgical History:  Procedure Laterality Date    OTHER SURGICAL HISTORY  07/28/2021    Ventral hernia repair    OTHER SURGICAL HISTORY  07/28/2021    Appendectomy    OTHER SURGICAL HISTORY  07/27/2020    Hip replacement   [4]   Family History  Problem Relation Name Age of Onset    Hypertension Mother      Hypertension Father      Hypertension Other maternal relatives     Diabetes type II Other maternal relatives     Hypertension Other paternal relatives     Diabetes type II Other paternal relatives      "

## 2025-04-29 DIAGNOSIS — Z79.4 TYPE 2 DIABETES MELLITUS WITHOUT COMPLICATION, WITH LONG-TERM CURRENT USE OF INSULIN: ICD-10-CM

## 2025-04-29 DIAGNOSIS — E11.9 TYPE 2 DIABETES MELLITUS WITHOUT COMPLICATION, WITH LONG-TERM CURRENT USE OF INSULIN: ICD-10-CM

## 2025-04-29 PROCEDURE — RXMED WILLOW AMBULATORY MEDICATION CHARGE

## 2025-04-29 RX ORDER — GLYBURIDE 5 MG/1
5 TABLET ORAL
Qty: 180 TABLET | Refills: 1 | Status: SHIPPED | OUTPATIENT
Start: 2025-04-29 | End: 2025-04-29 | Stop reason: SDUPTHER

## 2025-04-29 RX ORDER — GLYBURIDE 5 MG/1
10 TABLET ORAL
Qty: 360 TABLET | Refills: 1 | Status: SHIPPED | OUTPATIENT
Start: 2025-04-29

## 2025-05-01 ENCOUNTER — PHARMACY VISIT (OUTPATIENT)
Dept: PHARMACY | Facility: CLINIC | Age: 60
End: 2025-05-01
Payer: COMMERCIAL

## 2025-05-05 ENCOUNTER — PROCEDURE VISIT (OUTPATIENT)
Dept: SLEEP MEDICINE | Facility: HOSPITAL | Age: 60
End: 2025-05-05
Payer: COMMERCIAL

## 2025-05-05 ENCOUNTER — APPOINTMENT (OUTPATIENT)
Dept: OPHTHALMOLOGY | Facility: CLINIC | Age: 60
End: 2025-05-05
Payer: COMMERCIAL

## 2025-05-05 DIAGNOSIS — G47.30 SLEEP APNEA, UNSPECIFIED TYPE: ICD-10-CM

## 2025-05-05 NOTE — PROGRESS NOTES
Type of Study: HOME SLEEP STUDY - NOMAD     The patient received equipment and instructions for use of the [facility] Nomad HSAT device. The patient was instructed how to apply the effort belts, cannula, thermistor. It was also explained how the Nomad and oximeter components work.  The patient was asked to record their sleep for an 8-hour period.     The patient was informed of their responsibility for the device and acknowledged this by signing the HSAT device contract. The patient was asked to return the device on 05/06/2025 between the hours of 0800am-0200pm to the Sleep Center.     The patient was instructed to call 911 as usual for any medical- emergencies while at home.  The patient was also given a phone number for troubleshooting when using the device in case there were additional questions.

## 2025-05-06 NOTE — PROGRESS NOTES
Patient had an inconclusive HSAT sleep study due to short recording (< 4 hrs).    Patient should be rescheduled for repeat attempt of HSAT or an in-lab sleep study.

## 2025-05-09 DIAGNOSIS — E11.65 INADEQUATELY CONTROLLED DIABETES MELLITUS (MULTI): ICD-10-CM

## 2025-05-09 PROCEDURE — RXMED WILLOW AMBULATORY MEDICATION CHARGE

## 2025-05-09 RX ORDER — INSULIN DEGLUDEC 100 U/ML
28 INJECTION, SOLUTION SUBCUTANEOUS NIGHTLY
Qty: 25.2 ML | Refills: 1 | Status: SHIPPED | OUTPATIENT
Start: 2025-05-09 | End: 2025-05-09

## 2025-05-09 RX ORDER — INSULIN DEGLUDEC 100 U/ML
38 INJECTION, SOLUTION SUBCUTANEOUS NIGHTLY
Qty: 45 ML | Refills: 1 | Status: SHIPPED | OUTPATIENT
Start: 2025-05-09 | End: 2026-05-09

## 2025-05-10 ENCOUNTER — PHARMACY VISIT (OUTPATIENT)
Dept: PHARMACY | Facility: CLINIC | Age: 60
End: 2025-05-10
Payer: COMMERCIAL

## 2025-05-14 ENCOUNTER — LAB (OUTPATIENT)
Dept: LAB | Facility: HOSPITAL | Age: 60
End: 2025-05-14
Payer: COMMERCIAL

## 2025-05-14 DIAGNOSIS — I48.92 UNSPECIFIED ATRIAL FLUTTER (MULTI): Primary | ICD-10-CM

## 2025-05-14 LAB
ABO GROUP (TYPE) IN BLOOD: NORMAL
ANTIBODY SCREEN: NORMAL
RH FACTOR (ANTIGEN D): NORMAL

## 2025-05-14 PROCEDURE — 36415 COLL VENOUS BLD VENIPUNCTURE: CPT

## 2025-05-14 PROCEDURE — 86900 BLOOD TYPING SEROLOGIC ABO: CPT

## 2025-05-14 PROCEDURE — 86901 BLOOD TYPING SEROLOGIC RH(D): CPT

## 2025-05-14 PROCEDURE — 86850 RBC ANTIBODY SCREEN: CPT

## 2025-05-15 ENCOUNTER — PATIENT OUTREACH (OUTPATIENT)
Dept: CARE COORDINATION | Facility: CLINIC | Age: 60
End: 2025-05-15
Payer: COMMERCIAL

## 2025-05-15 LAB
ANION GAP SERPL CALCULATED.4IONS-SCNC: 14 MMOL/L (CALC) (ref 7–17)
BUN SERPL-MCNC: 80 MG/DL (ref 7–25)
BUN/CREAT SERPL: 38 (CALC) (ref 6–22)
CALCIUM SERPL-MCNC: 9.2 MG/DL (ref 8.6–10.3)
CHLORIDE SERPL-SCNC: 96 MMOL/L (ref 98–110)
CO2 SERPL-SCNC: 26 MMOL/L (ref 20–32)
CREAT SERPL-MCNC: 2.12 MG/DL (ref 0.7–1.3)
EGFRCR SERPLBLD CKD-EPI 2021: 35 ML/MIN/1.73M2
ERYTHROCYTE [DISTWIDTH] IN BLOOD BY AUTOMATED COUNT: 19.3 % (ref 11–15)
GLUCOSE SERPL-MCNC: 252 MG/DL (ref 65–99)
HCT VFR BLD AUTO: 38 % (ref 38.5–50)
HGB BLD-MCNC: 11.5 G/DL (ref 13.2–17.1)
MCH RBC QN AUTO: 23.2 PG (ref 27–33)
MCHC RBC AUTO-ENTMCNC: 30.3 G/DL (ref 32–36)
MCV RBC AUTO: 76.8 FL (ref 80–100)
PLATELET # BLD AUTO: 261 THOUSAND/UL (ref 140–400)
PMV BLD REES-ECKER: 10.1 FL (ref 7.5–12.5)
POTASSIUM SERPL-SCNC: 3.7 MMOL/L (ref 3.5–5.3)
RBC # BLD AUTO: 4.95 MILLION/UL (ref 4.2–5.8)
SODIUM SERPL-SCNC: 136 MMOL/L (ref 135–146)
WBC # BLD AUTO: 8.8 THOUSAND/UL (ref 3.8–10.8)

## 2025-05-15 NOTE — PROGRESS NOTES
Reviewed chart prior to patient outreach. Outreach call to patient to check in 30 days after hospital discharge to support smooth transition of care.  Patient denies chest pain or edema. Endorses shortness of breath with exertion. Patient is currently on Eliquis, denies signs or symptoms of bleeding. He has increased his hydralazine and resumed bumex as directed. Patient reports that he is scheduled for an ablation. Patient with no additional needs noted. No additional outreach needed at this time.    Claire Best RN BSN  O Care Manager  [phone]

## 2025-05-16 ENCOUNTER — TELEPHONE (OUTPATIENT)
Dept: SLEEP MEDICINE | Facility: HOSPITAL | Age: 60
End: 2025-05-16
Payer: COMMERCIAL

## 2025-05-16 DIAGNOSIS — G47.33 OSA (OBSTRUCTIVE SLEEP APNEA): ICD-10-CM

## 2025-05-16 NOTE — TELEPHONE ENCOUNTER
----- Message from Wilfredo Rosario sent at 5/15/2025  8:54 PM EDT -----  Lamont Montgomery    Let's get him a new machine.  REPAP    Thank you      Wilfredo  ----- Message -----  From: [facility] - Lab Results In  Sent: 5/15/2025   8:44 PM EDT  To: Wilfredo Rosario MD

## 2025-05-16 NOTE — TELEPHONE ENCOUNTER
Called patient regarding the sleep study result. Patient agreeable to start APAP therapy with [facility]. Patient scheduled for a follow-up appointment with DR GOLDBERG between 30-90 days after CPAP initiation on 8/29/2025. Patient verbalized understanding, all questions answered.

## 2025-05-23 ENCOUNTER — PATIENT MESSAGE (OUTPATIENT)
Dept: SLEEP MEDICINE | Facility: CLINIC | Age: 60
End: 2025-05-23
Payer: COMMERCIAL

## 2025-05-23 ENCOUNTER — PHARMACY VISIT (OUTPATIENT)
Dept: PHARMACY | Facility: CLINIC | Age: 60
End: 2025-05-23
Payer: COMMERCIAL

## 2025-05-23 PROCEDURE — RXMED WILLOW AMBULATORY MEDICATION CHARGE

## 2025-05-23 NOTE — PROGRESS NOTES
Shola Caraballo is a 59 y.o. male with past medical history of HTN, CHF, atrial flutter on Eliquis, sleep apnea, CKD, type 2 diabetes, and GERD who is referred by PCP Dr. Christopher D'Amico for chronic diarrhea.    He reports a *** history of ***. Associated with ***. Worse with ***.     He has a microcytic anemia that has worsened over the last couple of months. He has tried *** with *** improvement.     Denies abdominal pain, nausea, vomiting, heartburn, early satiety, and bloating. No unintentional weight loss.     He does not take NSAIDS regularly.     No prior EGD. Colonoscopy 2015 normal.    Social history: -    Family history: Denies family history of colon cancer or other GI disorders or malignancy.     Medical History[1]  Surgical History[2]  Current Medications[3]      Review of Systems  Review of Systems negative except as noted in HPI.    Objective     There were no vitals taken for this visit.     Physical Exam  Constitutional:  No acute distress. Normal appearance. Not ill-appearing.  HENT:  Head normocephalic and atraumatic. Conjunctivae normal.  Cardiovascular:  Normal rate. Regular rhythm.  Pulmonary:  Pulmonary effort normal. No respiratory distress. Breath sounds clear.  Abdominal:  Abdomen is flat and soft. There is no distension. No tenderness or guarding.  Skin: Dry.  Neurological:  Alert and oriented.  Psychiatric:  Mood and affect normal.    Assessment/Plan     59 y.o. male with history of *** who presents today for clinic visit for *** history of ***.    Recommendations  1.  2. Follow up    Electronically signed by: Neelima Walsh CNP on 5/23/2025 at 10:15 AM           [1]   Past Medical History:  Diagnosis Date    Cellulitis of unspecified toe 09/05/2013    Paronychia of toe    Chronic combined systolic (congestive) and diastolic (congestive) heart failure 01/09/2020    Chronic combined systolic and diastolic congestive heart failure    Chronic kidney disease, unspecified 06/09/2014     Chronic renal insufficiency    Chronic venous hypertension (idiopathic) with inflammation of bilateral lower extremity 04/30/2019    Chronic venous hypertension with inflammation involving both sides    Contusion of unspecified lesser toe(s) without damage to nail, initial encounter 11/12/2013    Contusion of toe    Corns and callosities 01/10/2014    Callus    Essential (primary) hypertension 03/09/2022    Uncontrolled hypertension    Gout, unspecified 02/03/2021    Acute gout    Methicillin susceptible Staphylococcus aureus infection, unspecified site 11/22/2019    MSSA (methicillin susceptible Staphylococcus aureus)    Other acute osteomyelitis, unspecified site 09/03/2013    Acute osteomyelitis    Other bacterial infections of unspecified site 11/22/2019    Pseudomonas infection    Other conditions influencing health status 04/02/2019    Chronic venous hypertension w ulceration, right    Other conditions influencing health status 12/10/2013    Chronic Osteomyelitis Of The Toes    Other specified soft tissue disorders 08/10/2015    Hand swelling    Other specified soft tissue disorders 10/08/2020    Hand swelling    Pain in left foot 03/04/2016    Left foot pain    Personal history of diseases of the blood and blood-forming organs and certain disorders involving the immune mechanism 10/18/2016    History of anemia    Personal history of other diseases of the circulatory system 10/30/2013    History of congestive heart disease    Personal history of other diseases of the digestive system 12/08/2015    History of rectal bleeding    Personal history of other endocrine, nutritional and metabolic disease 02/13/2015    History of hyperlipidemia    Personal history of other endocrine, nutritional and metabolic disease 07/27/2020    History of morbid obesity    Sciatica, left side 11/11/2016    Sciatica of left side    Unspecified injury of unspecified foot, initial encounter 04/02/2019    Injury of toe, initial  encounter   [2]   Past Surgical History:  Procedure Laterality Date    OTHER SURGICAL HISTORY  07/28/2021    Ventral hernia repair    OTHER SURGICAL HISTORY  07/28/2021    Appendectomy    OTHER SURGICAL HISTORY  07/27/2020    Hip replacement   [3]   Current Outpatient Medications   Medication Sig Dispense Refill    allopurinol (Zyloprim) 300 mg tablet Take 1 tablet (300 mg) by mouth once daily. 90 tablet 3    amLODIPine (Norvasc) 2.5 mg tablet Take 1 tablet (2.5 mg) by mouth once daily. 30 tablet 5    apixaban (Eliquis) 5 mg tablet Take 1 tablet (5 mg) by mouth 2 times a day. 60 tablet 11    ascorbic acid (Vitamin C) 1,000 mg tablet Take 0.5 tablets (500 mg) by mouth once daily.      blood sugar diagnostic (Accu-Chek Guide test strips) Use to test blood sugar twice daily. 100 strip 3    bumetanide (Bumex) 2 mg tablet Take 1 tablet (2 mg) by mouth once daily. 90 tablet 3    calcitriol (Rocaltrol) 0.25 mcg capsule Take 1 capsule (0.25 mcg) by mouth 3 times a week. 36 capsule 3    chlorthalidone (Hygroton) 25 mg tablet Take 1 tablet by mouth daily 90 tablet 3    colchicine 0.6 mg tablet Take 1 tablet (0.6 mg) by mouth once daily as needed for gout flare. 10 tablet 1    dapagliflozin propanediol (Farxiga) 10 mg tablet TAKE 1 TABLET BY MOUTH EVERY MORNING 90 tablet 1    enzymes,digestive (DIGESTIVE ENZYMES ORAL) Take 1 capsule by mouth once daily as needed (stomach upset). (Patient not taking: Reported on 4/15/2025)      glyBURIDE (Diabeta) 5 mg tablet Take 2 tablets (10 mg) by mouth 2 times daily (morning and late afternoon). 360 tablet 1    hydrALAZINE (Apresoline) 50 mg tablet Take 1 tablet (50 mg) by mouth 2 times a day. 60 tablet 11    insulin degludec (Tresiba FlexTouch) 100 unit/mL (3 mL) pen Inject 38 Units under the skin once daily at bedtime. Take as directed per insulin instructions. 45 mL 1    isosorbide dinitrate (Isordil) 10 mg tablet Take 1 tablet (10 mg) by mouth once daily. 90 tablet 3    lancets misc  "USE TO TEST BLOOD SUGAR TWICE DAILY 100 each 2    lisinopril 40 mg tablet TAKE 1 TABLET BY MOUTH ONCE DAILY 90 tablet 3    metoprolol succinate XL (Toprol-XL) 100 mg 24 hr tablet TAKE 1 TABLET BY MOUTH ONCE DAILY 90 tablet 3    multivitamin with minerals (DAILY VITAMIN FORMULA-MINERALS) tablet 1 tablet DAILY (route: oral)      pantoprazole (ProtoNix) 40 mg EC tablet Take 1 tablet (40 mg) by mouth once daily. 90 tablet 3    pen needle, diabetic (BD Karine 2nd Gen Pen Needle) 32 gauge x 5/32\" needle Use with insulin daily 100 each 1     No current facility-administered medications for this visit.     "

## 2025-05-27 DIAGNOSIS — M10.9 GOUT, UNSPECIFIED CAUSE, UNSPECIFIED CHRONICITY, UNSPECIFIED SITE: ICD-10-CM

## 2025-05-27 DIAGNOSIS — I50.30 HEART FAILURE WITH PRESERVED EJECTION FRACTION, UNSPECIFIED HF CHRONICITY: ICD-10-CM

## 2025-05-28 PROCEDURE — RXMED WILLOW AMBULATORY MEDICATION CHARGE

## 2025-05-28 RX ORDER — CHLORTHALIDONE 25 MG/1
TABLET ORAL
Qty: 90 TABLET | Refills: 3 | Status: SHIPPED | OUTPATIENT
Start: 2025-05-28

## 2025-05-28 RX ORDER — COLCHICINE 0.6 MG/1
0.6 TABLET ORAL DAILY PRN
Qty: 10 TABLET | Refills: 1 | Status: SHIPPED | OUTPATIENT
Start: 2025-05-28

## 2025-05-30 ENCOUNTER — PHARMACY VISIT (OUTPATIENT)
Dept: PHARMACY | Facility: CLINIC | Age: 60
End: 2025-05-30
Payer: COMMERCIAL

## 2025-05-30 PROCEDURE — RXMED WILLOW AMBULATORY MEDICATION CHARGE

## 2025-05-30 RX ORDER — AMOXICILLIN AND CLAVULANATE POTASSIUM 500; 125 MG/1; MG/1
1 TABLET, FILM COATED ORAL EVERY 12 HOURS
Qty: 20 TABLET | Refills: 0 | OUTPATIENT
Start: 2025-05-30

## 2025-06-02 ENCOUNTER — APPOINTMENT (OUTPATIENT)
Dept: GASTROENTEROLOGY | Facility: HOSPITAL | Age: 60
End: 2025-06-02
Payer: COMMERCIAL

## 2025-06-03 ENCOUNTER — HOSPITAL ENCOUNTER (OUTPATIENT)
Facility: HOSPITAL | Age: 60
Setting detail: OUTPATIENT SURGERY
Discharge: HOME | End: 2025-06-03
Attending: INTERNAL MEDICINE | Admitting: INTERNAL MEDICINE
Payer: COMMERCIAL

## 2025-06-03 VITALS — HEIGHT: 72 IN | WEIGHT: 315 LBS | BODY MASS INDEX: 42.66 KG/M2

## 2025-06-03 DIAGNOSIS — I48.92 ATRIAL FLUTTER, UNSPECIFIED TYPE (MULTI): ICD-10-CM

## 2025-06-03 LAB
ABO GROUP (TYPE) IN BLOOD: NORMAL
ANTIBODY SCREEN: NORMAL
RH FACTOR (ANTIGEN D): NORMAL

## 2025-06-03 PROCEDURE — 99153 MOD SED SAME PHYS/QHP EA: CPT | Performed by: INTERNAL MEDICINE

## 2025-06-03 PROCEDURE — 2780000003 HC OR 278 NO HCPCS: Performed by: INTERNAL MEDICINE

## 2025-06-03 PROCEDURE — 7100000010 HC PHASE TWO TIME - EACH INCREMENTAL 1 MINUTE: Performed by: INTERNAL MEDICINE

## 2025-06-03 PROCEDURE — 36415 COLL VENOUS BLD VENIPUNCTURE: CPT | Performed by: INTERNAL MEDICINE

## 2025-06-03 PROCEDURE — 93653 COMPRE EP EVAL TX SVT: CPT | Performed by: INTERNAL MEDICINE

## 2025-06-03 PROCEDURE — 86901 BLOOD TYPING SEROLOGIC RH(D): CPT | Performed by: INTERNAL MEDICINE

## 2025-06-03 PROCEDURE — RXMED WILLOW AMBULATORY MEDICATION CHARGE

## 2025-06-03 PROCEDURE — C1760 CLOSURE DEV, VASC: HCPCS | Performed by: INTERNAL MEDICINE

## 2025-06-03 PROCEDURE — C1732 CATH, EP, DIAG/ABL, 3D/VECT: HCPCS | Performed by: INTERNAL MEDICINE

## 2025-06-03 PROCEDURE — G0269 OCCLUSIVE DEVICE IN VEIN ART: HCPCS | Performed by: INTERNAL MEDICINE

## 2025-06-03 PROCEDURE — 2500000004 HC RX 250 GENERAL PHARMACY W/ HCPCS (ALT 636 FOR OP/ED): Performed by: INTERNAL MEDICINE

## 2025-06-03 PROCEDURE — C1887 CATHETER, GUIDING: HCPCS | Performed by: INTERNAL MEDICINE

## 2025-06-03 PROCEDURE — 99152 MOD SED SAME PHYS/QHP 5/>YRS: CPT | Performed by: INTERNAL MEDICINE

## 2025-06-03 PROCEDURE — 7100000009 HC PHASE TWO TIME - INITIAL BASE CHARGE: Performed by: INTERNAL MEDICINE

## 2025-06-03 PROCEDURE — C1730 CATH, EP, 19 OR FEW ELECT: HCPCS | Performed by: INTERNAL MEDICINE

## 2025-06-03 PROCEDURE — 2720000007 HC OR 272 NO HCPCS: Performed by: INTERNAL MEDICINE

## 2025-06-03 PROCEDURE — 76937 US GUIDE VASCULAR ACCESS: CPT | Performed by: INTERNAL MEDICINE

## 2025-06-03 RX ORDER — BUPIVACAINE HYDROCHLORIDE 5 MG/ML
INJECTION, SOLUTION EPIDURAL; INTRACAUDAL; PERINEURAL AS NEEDED
Status: DISCONTINUED | OUTPATIENT
Start: 2025-06-03 | End: 2025-06-03 | Stop reason: HOSPADM

## 2025-06-03 RX ORDER — FENTANYL CITRATE 50 UG/ML
INJECTION, SOLUTION INTRAMUSCULAR; INTRAVENOUS AS NEEDED
Status: DISCONTINUED | OUTPATIENT
Start: 2025-06-03 | End: 2025-06-03 | Stop reason: HOSPADM

## 2025-06-03 RX ORDER — MIDAZOLAM HYDROCHLORIDE 1 MG/ML
INJECTION, SOLUTION INTRAMUSCULAR; INTRAVENOUS AS NEEDED
Status: DISCONTINUED | OUTPATIENT
Start: 2025-06-03 | End: 2025-06-03 | Stop reason: HOSPADM

## 2025-06-03 ASSESSMENT — PAIN SCALES - GENERAL: PAINLEVEL_OUTOF10: 0 - NO PAIN

## 2025-06-03 ASSESSMENT — PAIN - FUNCTIONAL ASSESSMENT: PAIN_FUNCTIONAL_ASSESSMENT: 0-10

## 2025-06-03 NOTE — DISCHARGE INSTRUCTIONS
INSTRUCTIONS AFTER ABLATION PROCEDURE:    * You will need to continue blood thinner (Eliquis every 12 hours) until instructed otherwise. It is important not to interrupt blood thinner for any reason (other than an emergency) during the first 30 days after ablation.    * In the first week after ablation you should take it easy. No heavy lifting or heavy exertion, no treadmill.  You can use the stairs if needed but go slowly and minimize the number of times up and down.    * Some minor bruising is common at each groin access site with minor soreness as if you had banged the area. Bruising may occasionally be seen to extend down the leg. This is normal as is an occasional small quarter sized bump in the area. If larger swelling or more significant pain occurs at the area, please contact the office or go the nearest Emergency Room.    * All medications will generally remain the same unless you are told otherwise.  Resume taking your home medications today as listed on the discharge instructions.    *Continue to follow up with your primary cardiologist, primary care physician, and any other specialists you normally see.    *No driving for 2 days post procedure (IF you were driving prior to procedure).    *Diet: Heart healthy    Call Provider If:  Breathing faster than normal.     Fever of 100.4 F (38 C) or higher.     Chills.     Any new concerning symptoms.     Passing out.     Patient Instructions, Next 24 hours:  DO NOT drive a car, operate machinery or power tools.  It is recommended that a responsible adult be with you for the first 24 hours.     DO NOT drink any alcoholic drinks or take any non-prescriptive medications that contain alcohol for the first 24 hours.     DO NOT make any important decisions for the first 24 hours.    Activity:  You are advised to go directly home from the hospital.     DO NOT lift anything heavier than 10 pounds for one week, this allows for proper healing of the groin.     No excessive  exercise or treadmill use for one week. You may walk and do stairs, slowly.     No sexual activities for 24 hours after you arrive home.    Wound Care:  If slight bleeding should occur at site, lie down and have someone apply firm pressure just above the puncture site for 5 minutes.  If it continues or is profuse, call 911. Always notify your doctor if bleeding occurs.     Keep site clean and dry. Let air dry or you may use a simple bandaid.     Gently cleanse the puncture site in your groin with soap and water only.     You may experience some tenderness, bruising or minimal inflammation.  If you have any concerns, you may contact the Cath Lab or if any of these symptoms become excessive, contact your cardiologist or go to the emergency room.     No tub baths, soaking, or swimming for one week.     May shower the next day after your procedure.    If you have any questions about the effects of the sedative drugs or groin care, please call the physician who performed your procedure.    FOLLOW UP:   1) Tierney Roth CNP ( Electrophysiology) 1 month after ablation   will notify you of appointment. If you haven't heard in 1 week, please call [phone].

## 2025-06-03 NOTE — CONSULTS
Consults  Patient: Shola Caraballo    Procedure Information       Date/Time: 06/03/25 1330    Procedure: Ablation SVT - Vendor: [employer]    [place] [facility] [place] [facility] Cardiac Cath Lab    Providers: Greg Stanton MD            There were no vitals filed for this visit.    Surgical History[1]  Medical History[2]  Current Medications[3]  Prior to Admission medications    Medication Sig Start Date End Date Taking? Authorizing Provider   allopurinol (Zyloprim) 300 mg tablet Take 1 tablet (300 mg) by mouth once daily. 3/13/25  Yes Christopher D'Amico, DO   amLODIPine (Norvasc) 2.5 mg tablet Take 1 tablet (2.5 mg) by mouth once daily. 3/13/25 9/9/25 Yes Alecia Whalen MD   apixaban (Eliquis) 5 mg tablet Take 1 tablet (5 mg) by mouth 2 times a day. 4/15/25  Yes Alecia Whalen MD   ascorbic acid (Vitamin C) 1,000 mg tablet Take 0.5 tablets (500 mg) by mouth once daily.   Yes Historical Provider, MD   blood sugar diagnostic (Accu-Chek Guide test strips) Use to test blood sugar twice daily. 1/16/25  Yes Christopher D'Amico, DO   bumetanide (Bumex) 2 mg tablet Take 1 tablet (2 mg) by mouth once daily. 3/13/25 3/13/26 Yes Alecia Whalen MD   calcitriol (Rocaltrol) 0.25 mcg capsule Take 1 capsule (0.25 mcg) by mouth 3 times a week. 10/23/24 10/23/25 Yes Jaida Baez MD   chlorthalidone (Hygroton) 25 mg tablet Take 1 tablet by mouth daily 5/28/25  Yes Jaida Baez MD   dapagliflozin propanediol (Farxiga) 10 mg tablet TAKE 1 TABLET BY MOUTH EVERY MORNING 3/13/25  Yes Christopher D'Amico, DO   enzymes,digestive (DIGESTIVE ENZYMES ORAL) Take 1 capsule by mouth once daily as needed (stomach upset).   Yes Historical Provider, MD   glyBURIDE (Diabeta) 5 mg tablet Take 2 tablets (10 mg) by mouth 2 times daily (morning and late afternoon). 4/29/25  Yes Leatha Carlos MD   hydrALAZINE (Apresoline) 50 mg tablet Take 1 tablet (50 mg) by mouth 2 times a day. 4/15/25 4/15/26 Yes Alecia Whalen MD   insulin  "degludec (Tresiba FlexTouch) 100 unit/mL (3 mL) pen Inject 38 Units under the skin once daily at bedtime. Take as directed per insulin instructions. 5/9/25 5/9/26 Yes Leatha Carlos MD   isosorbide dinitrate (Isordil) 10 mg tablet Take 1 tablet (10 mg) by mouth once daily. 3/13/25 3/13/26 Yes Alecia Whalen MD   lancets misc USE TO TEST BLOOD SUGAR TWICE DAILY 1/16/25  Yes Christopher D'Amico, DO   lisinopril 40 mg tablet TAKE 1 TABLET BY MOUTH ONCE DAILY 3/13/25 3/13/26 Yes Alecia Whalen MD   metoprolol succinate XL (Toprol-XL) 100 mg 24 hr tablet TAKE 1 TABLET BY MOUTH ONCE DAILY 3/13/25 3/13/26 Yes Alecia Whalen MD   pen needle, diabetic (BD Karine 2nd Gen Pen Needle) 32 gauge x 5/32\" needle Use with insulin daily 4/13/25  Yes Leatha Carlos MD   amoxicillin-clavulanate (Augmentin) 500-125 mg tablet Take 1 tablet by mouth every 12 hours.  Patient not taking: Reported on 6/3/2025 5/30/25   Luis Rojo DPM   colchicine 0.6 mg tablet Take 1 tablet (0.6 mg) by mouth once daily as needed for gout flare.  Patient not taking: Reported on 6/3/2025 5/28/25   Christopher D'Amico, DO   multivitamin with minerals (DAILY VITAMIN FORMULA-MINERALS) tablet 1 tablet DAILY (route: oral) 3/6/23   Historical Provider, MD   pantoprazole (ProtoNix) 40 mg EC tablet Take 1 tablet (40 mg) by mouth once daily. 3/13/25 3/13/26  Christopher D'Amico, DO     RX Allergies[4]  Social History     Tobacco Use    Smoking status: Former     Types: Cigarettes     Passive exposure: Past    Smokeless tobacco: Never   Substance Use Topics    Alcohol use: Not Currently     Comment: socially       [unfilled]  @BRIEFLABTABLE(WBC,HGB,HCT,PLT)@  @BRIEFLABTABLE(PROTIME,PTT,INR)@  Encounter Date: 04/15/25   ECG 12 Lead   Result Value    Ventricular Rate 103    Atrial Rate 110    NV Interval 240    QRS Duration 86    QT Interval 340    QTC Calculation(Bazett) 445    R Axis -15    T Axis 6    QRS Count 16    Q Onset 219    T Offset 389    " QTC Fredericia 407    Narrative    Atrial tachycardia wiyh variable AV bockl  Nonspecific T wave abnormality  Abnormal ECG  When compared with ECG of 2025 23:28,  Otherwise, no significant change was found  Confirmed by Dimitrios Arellano ([id number]) on 2025 9:18:32 PM     No results found for this or any previous visit from the past 1095 days.        Clinical information reviewed:   Tobacco  Allergies  Meds   Med Hx  Surg Hx   Fam Hx  Soc Hx         Physical Exam    Airway  Mallampati: III  TM distance: <3 FB  Neck ROM: full  Mouth openin finger widths     Cardiovascular - normal exam  Rhythm: regular  Rate: normal     Dental - normal exam     Pulmonary - normal exam   Abdominal (+) obese             Anesthesia Plan    History of general anesthesia?: yes  History of complications of general anesthesia?: no    ASA 3     other   (EP team to administer moderate sedation. Consulted to be available if further sedation is needed including general and MAC. Both explained to patient and spouse with all questions answered and verbalized understanding. )  intravenous induction   Anesthetic plan and risks discussed with patient and spouse.  Use of blood products discussed with patient and spouse who.    Plan discussed with CRNA and attending.               [1]   Past Surgical History:  Procedure Laterality Date    OTHER SURGICAL HISTORY  2021    Ventral hernia repair    OTHER SURGICAL HISTORY  2021    Appendectomy    OTHER SURGICAL HISTORY  2020    Hip replacement   [2]   Past Medical History:  Diagnosis Date    Cellulitis of unspecified toe 2013    Paronychia of toe    Chronic combined systolic (congestive) and diastolic (congestive) heart failure 2020    Chronic combined systolic and diastolic congestive heart failure    Chronic kidney disease, unspecified 2014    Chronic renal insufficiency    Chronic venous hypertension (idiopathic) with inflammation of bilateral lower  extremity 04/30/2019    Chronic venous hypertension with inflammation involving both sides    Contusion of unspecified lesser toe(s) without damage to nail, initial encounter 11/12/2013    Contusion of toe    Corns and callosities 01/10/2014    Callus    Essential (primary) hypertension 03/09/2022    Uncontrolled hypertension    Gout, unspecified 02/03/2021    Acute gout    Methicillin susceptible Staphylococcus aureus infection, unspecified site 11/22/2019    MSSA (methicillin susceptible Staphylococcus aureus)    Other acute osteomyelitis, unspecified site 09/03/2013    Acute osteomyelitis    Other bacterial infections of unspecified site 11/22/2019    Pseudomonas infection    Other conditions influencing health status 04/02/2019    Chronic venous hypertension w ulceration, right    Other conditions influencing health status 12/10/2013    Chronic Osteomyelitis Of The Toes    Other specified soft tissue disorders 08/10/2015    Hand swelling    Other specified soft tissue disorders 10/08/2020    Hand swelling    Pain in left foot 03/04/2016    Left foot pain    Personal history of diseases of the blood and blood-forming organs and certain disorders involving the immune mechanism 10/18/2016    History of anemia    Personal history of other diseases of the circulatory system 10/30/2013    History of congestive heart disease    Personal history of other diseases of the digestive system 12/08/2015    History of rectal bleeding    Personal history of other endocrine, nutritional and metabolic disease 02/13/2015    History of hyperlipidemia    Personal history of other endocrine, nutritional and metabolic disease 07/27/2020    History of morbid obesity    Sciatica, left side 11/11/2016    Sciatica of left side    Unspecified injury of unspecified foot, initial encounter 04/02/2019    Injury of toe, initial encounter   [3] No current facility-administered medications for this encounter.  [4] No Known Allergies

## 2025-06-03 NOTE — H&P
History Of Present Illness  Shola Caraballo is a 59 y.o. male presenting with AFL .     Past Medical History  Medical History[1]    Surgical History  Surgical History[2]     Social History  He reports that he has quit smoking. His smoking use included cigarettes. He has been exposed to tobacco smoke. He has never used smokeless tobacco. He reports that he does not currently use alcohol. He reports that he does not currently use drugs.    Family History  Family History[3]     Allergies  Patient has no known allergies.    Review of Systems   All other systems reviewed and are negative.       Physical Exam  Vitals reviewed.   Constitutional:       General: He is not in acute distress.     Appearance: Normal appearance. He is normal weight.   HENT:      Head: Normocephalic and atraumatic.      Nose: Nose normal.      Mouth/Throat:      Mouth: Mucous membranes are moist.      Pharynx: Oropharynx is clear.   Eyes:      Extraocular Movements: Extraocular movements intact.      Conjunctiva/sclera: Conjunctivae normal.      Pupils: Pupils are equal, round, and reactive to light.   Cardiovascular:      Rate and Rhythm: Normal rate and regular rhythm.      Pulses: Normal pulses.      Heart sounds: No murmur heard.     No friction rub. No gallop.   Pulmonary:      Effort: Pulmonary effort is normal.      Breath sounds: Normal breath sounds.   Abdominal:      General: Abdomen is flat. Bowel sounds are normal.      Palpations: Abdomen is soft.   Musculoskeletal:         General: Normal range of motion.      Cervical back: Normal range of motion.   Skin:     General: Skin is warm and dry.      Capillary Refill: Capillary refill takes less than 2 seconds.   Neurological:      General: No focal deficit present.      Mental Status: He is alert and oriented to person, place, and time. Mental status is at baseline.   Psychiatric:         Mood and Affect: Mood normal.         Behavior: Behavior normal.         Thought Content: Thought  content normal.         Judgment: Judgment normal.          Last Recorded Vitals  Height 1.829 m (6'), weight 147 kg (325 lb).    Relevant Results             Assessment & Plan  Atrial flutter (Multi)      AFL here for RFA    I spent 35 minutes in the professional and overall care of this patient.      Greg Stanton MD         [1]   Past Medical History:  Diagnosis Date    Cellulitis of unspecified toe 09/05/2013    Paronychia of toe    Chronic combined systolic (congestive) and diastolic (congestive) heart failure 01/09/2020    Chronic combined systolic and diastolic congestive heart failure    Chronic kidney disease, unspecified 06/09/2014    Chronic renal insufficiency    Chronic venous hypertension (idiopathic) with inflammation of bilateral lower extremity 04/30/2019    Chronic venous hypertension with inflammation involving both sides    Contusion of unspecified lesser toe(s) without damage to nail, initial encounter 11/12/2013    Contusion of toe    Corns and callosities 01/10/2014    Callus    Essential (primary) hypertension 03/09/2022    Uncontrolled hypertension    Gout, unspecified 02/03/2021    Acute gout    Methicillin susceptible Staphylococcus aureus infection, unspecified site 11/22/2019    MSSA (methicillin susceptible Staphylococcus aureus)    Other acute osteomyelitis, unspecified site 09/03/2013    Acute osteomyelitis    Other bacterial infections of unspecified site 11/22/2019    Pseudomonas infection    Other conditions influencing health status 04/02/2019    Chronic venous hypertension w ulceration, right    Other conditions influencing health status 12/10/2013    Chronic Osteomyelitis Of The Toes    Other specified soft tissue disorders 08/10/2015    Hand swelling    Other specified soft tissue disorders 10/08/2020    Hand swelling    Pain in left foot 03/04/2016    Left foot pain    Personal history of diseases of the blood and blood-forming organs and certain disorders involving the  immune mechanism 10/18/2016    History of anemia    Personal history of other diseases of the circulatory system 10/30/2013    History of congestive heart disease    Personal history of other diseases of the digestive system 12/08/2015    History of rectal bleeding    Personal history of other endocrine, nutritional and metabolic disease 02/13/2015    History of hyperlipidemia    Personal history of other endocrine, nutritional and metabolic disease 07/27/2020    History of morbid obesity    Sciatica, left side 11/11/2016    Sciatica of left side    Unspecified injury of unspecified foot, initial encounter 04/02/2019    Injury of toe, initial encounter   [2]   Past Surgical History:  Procedure Laterality Date    OTHER SURGICAL HISTORY  07/28/2021    Ventral hernia repair    OTHER SURGICAL HISTORY  07/28/2021    Appendectomy    OTHER SURGICAL HISTORY  07/27/2020    Hip replacement   [3]   Family History  Problem Relation Name Age of Onset    Hypertension Mother      Hypertension Father      Hypertension Other maternal relatives     Diabetes type II Other maternal relatives     Hypertension Other paternal relatives     Diabetes type II Other paternal relatives

## 2025-06-05 ENCOUNTER — PHARMACY VISIT (OUTPATIENT)
Dept: PHARMACY | Facility: CLINIC | Age: 60
End: 2025-06-05
Payer: COMMERCIAL

## 2025-06-07 DIAGNOSIS — I12.9 HYPERTENSIVE CHRONIC KIDNEY DISEASE WITH STAGE 1 THROUGH STAGE 4 CHRONIC KIDNEY DISEASE, OR UNSPECIFIED CHRONIC KIDNEY DISEASE: Primary | ICD-10-CM

## 2025-06-11 ENCOUNTER — APPOINTMENT (OUTPATIENT)
Dept: NEPHROLOGY | Facility: CLINIC | Age: 60
End: 2025-06-11
Payer: COMMERCIAL

## 2025-06-11 VITALS
HEART RATE: 70 BPM | HEIGHT: 72 IN | WEIGHT: 315 LBS | BODY MASS INDEX: 42.66 KG/M2 | DIASTOLIC BLOOD PRESSURE: 59 MMHG | SYSTOLIC BLOOD PRESSURE: 112 MMHG

## 2025-06-11 DIAGNOSIS — N18.30 STAGE 3 CHRONIC KIDNEY DISEASE, UNSPECIFIED WHETHER STAGE 3A OR 3B CKD (MULTI): Primary | ICD-10-CM

## 2025-06-11 PROCEDURE — 3078F DIAST BP <80 MM HG: CPT | Performed by: INTERNAL MEDICINE

## 2025-06-11 PROCEDURE — 3008F BODY MASS INDEX DOCD: CPT | Performed by: INTERNAL MEDICINE

## 2025-06-11 PROCEDURE — 3074F SYST BP LT 130 MM HG: CPT | Performed by: INTERNAL MEDICINE

## 2025-06-11 PROCEDURE — 1036F TOBACCO NON-USER: CPT | Performed by: INTERNAL MEDICINE

## 2025-06-11 PROCEDURE — 4010F ACE/ARB THERAPY RXD/TAKEN: CPT | Performed by: INTERNAL MEDICINE

## 2025-06-11 PROCEDURE — 99213 OFFICE O/P EST LOW 20 MIN: CPT | Performed by: INTERNAL MEDICINE

## 2025-06-11 ASSESSMENT — PAIN SCALES - GENERAL: PAINLEVEL_OUTOF10: 0-NO PAIN

## 2025-06-11 NOTE — PROGRESS NOTES
Shola Caraballo is a 59 y.o. male here in follow up for CKD    Subjective   Has no complaints today. He developed in the interim atrial flutter, was seen by Dr. Stanton and underwent RFA on 6/3. Has outpatient f/u with cards on 7/7. Now returning gradually to his routine activities. Denies currently any chest pain, dyspnea, leg edema, dysuria or hematuria. Follows regularly with PCP, heart failure and endocrinology. Meds were reviewed and updated in EMR.   Of note, he was transiently on Mounjaro, but could not tolerate increased dose, so discontinued.     ROS  All the rest reviewed and negative    Objective     Vital signs  /59 (BP Location: Right arm, Patient Position: Sitting, BP Cuff Size: Large adult)   Pulse 70   Ht 1.829 m (6')   Wt (!) 152 kg (335 lb)   BMI 45.43 kg/m²     Patient Vitals for the past 24 hrs:   BP Pulse Height Weight   06/11/25 0916 112/59 70 1.829 m (6') (!) 152 kg (335 lb)         Physical Exam  Constitutional:  well appearing, in NAD  Psychiatric:  appropriate mood and behavior    HEENT: NC/AT, clear sclerae, moist mucous membranes  Cardiovascular: distant, regular,  no murmurs, gallop or rub  Pulmonary:  Normal breath sounds  Extremities: no edema  Skin:  warm and dry    Meds  Current Medications[1]     Allergies  RX Allergies[2]     Results  Component      Latest Ref Rng 3/19/2025 5/14/2025   UREA NITROGEN (BUN)      7 - 25 mg/dL 59 (H)  80 (H)    CREATININE      0.70 - 1.30 mg/dL 1.97 (H)  2.12 (H)    EGFR      > OR = 60 mL/min/1.73m2 39 (L)  35 (L)        Imaging results  CT ABDOMEN AND PELVIS WO CONTRAST;  6/14/2023     ADRENAL GLANDS:   Bilateral adrenal glands appear normal.   KIDNEYS AND URETERS:   The kidneys are normal in size and unremarkable in appearance.  No hydroureteronephrosis or nephroureterolithiasis is identified.   BLADDER:   The urinary bladder appears normal without abnormal wall thickening.   REPRODUCTIVE ORGANS:   The prostate is not enlarged.      Luis Armando Yeager MD 6/14/2023 14:53     Assessment and Plan   Impression:  1. CKD stage G3b/A1, likely diabetic and hypertensive kidney disease. Sickle cell trait positive.  Secondary FSGS and secondary amyloidosis possible but unlikely. On maximal dose of ACEi . Last s.creatinine 2.12 mg/dl in May (above) a bit worse, likely in the setting of altered hemodynamics from A Flutter (now s/p RFA).  2. HTN -low, but asymptomatic.   3. Hyperparathyroidism of CKD (last  pg/ml in December) - repeat ordered. On vitamin D supplements; last 25D level 42 ng/ml in March.     Plan/recommendations:  - continue antihypertensives as are  - home BP check am and pm for 3-5 days and send readings to me  - repeat labs per orders  - follow up with PCP, endocrinology and cardiology as scheduled     RTC in follow up in 6 months if repeat RFP better.    Jaida Baez MD         [1]   Current Outpatient Medications:     allopurinol (Zyloprim) 300 mg tablet, Take 1 tablet (300 mg) by mouth once daily., Disp: 90 tablet, Rfl: 3    amLODIPine (Norvasc) 2.5 mg tablet, Take 1 tablet (2.5 mg) by mouth once daily., Disp: 30 tablet, Rfl: 5    apixaban (Eliquis) 5 mg tablet, Take 1 tablet (5 mg) by mouth 2 times a day., Disp: 60 tablet, Rfl: 11    ascorbic acid (Vitamin C) 1,000 mg tablet, Take 0.5 tablets (500 mg) by mouth once daily., Disp: , Rfl:     blood sugar diagnostic (Accu-Chek Guide test strips), Use to test blood sugar twice daily., Disp: 100 strip, Rfl: 3    bumetanide (Bumex) 2 mg tablet, Take 1 tablet (2 mg) by mouth once daily., Disp: 90 tablet, Rfl: 3    calcitriol (Rocaltrol) 0.25 mcg capsule, Take 1 capsule (0.25 mcg) by mouth 3 times a week., Disp: 36 capsule, Rfl: 3    chlorthalidone (Hygroton) 25 mg tablet, Take 1 tablet by mouth daily, Disp: 90 tablet, Rfl: 3    colchicine 0.6 mg tablet, Take 1 tablet (0.6 mg) by mouth once daily as needed for gout flare., Disp: 10 tablet, Rfl: 1    dapagliflozin propanediol  "(Farxiga) 10 mg tablet, TAKE 1 TABLET BY MOUTH EVERY MORNING, Disp: 90 tablet, Rfl: 1    glyBURIDE (Diabeta) 5 mg tablet, Take 2 tablets (10 mg) by mouth 2 times daily (morning and late afternoon)., Disp: 360 tablet, Rfl: 1    hydrALAZINE (Apresoline) 50 mg tablet, Take 1 tablet (50 mg) by mouth 2 times a day., Disp: 60 tablet, Rfl: 11    insulin degludec (Tresiba FlexTouch) 100 unit/mL (3 mL) pen, Inject 38 Units under the skin once daily at bedtime. Take as directed per insulin instructions., Disp: 45 mL, Rfl: 1    isosorbide dinitrate (Isordil) 10 mg tablet, Take 1 tablet (10 mg) by mouth once daily., Disp: 90 tablet, Rfl: 3    lancets misc, USE TO TEST BLOOD SUGAR TWICE DAILY, Disp: 100 each, Rfl: 2    lisinopril 40 mg tablet, TAKE 1 TABLET BY MOUTH ONCE DAILY, Disp: 90 tablet, Rfl: 3    metoprolol succinate XL (Toprol-XL) 100 mg 24 hr tablet, TAKE 1 TABLET BY MOUTH ONCE DAILY, Disp: 90 tablet, Rfl: 3    multivitamin with minerals (DAILY VITAMIN FORMULA-MINERALS) tablet, 1 tablet DAILY (route: oral), Disp: , Rfl:     pantoprazole (ProtoNix) 40 mg EC tablet, Take 1 tablet (40 mg) by mouth once daily. (Patient taking differently: Take 1 tablet (40 mg) by mouth if needed.), Disp: 90 tablet, Rfl: 3    pen needle, diabetic (BD Karine 2nd Gen Pen Needle) 32 gauge x 5/32\" needle, Use with insulin daily, Disp: 100 each, Rfl: 1    enzymes,digestive (DIGESTIVE ENZYMES ORAL), Take 1 capsule by mouth once daily as needed (stomach upset). (Patient not taking: Reported on 6/11/2025), Disp: , Rfl:   [2] No Known Allergies    "

## 2025-06-11 NOTE — PATIENT INSTRUCTIONS
Blood and urine tests at your convenience.  No changes in medications  If kidney function is stable, I will see you in follow up in 6 months.

## 2025-06-12 ENCOUNTER — LAB (OUTPATIENT)
Dept: LAB | Facility: HOSPITAL | Age: 60
End: 2025-06-12
Payer: COMMERCIAL

## 2025-06-12 DIAGNOSIS — I12.9 HYPERTENSIVE CHRONIC KIDNEY DISEASE WITH STAGE 1 THROUGH STAGE 4 CHRONIC KIDNEY DISEASE, OR UNSPECIFIED CHRONIC KIDNEY DISEASE: Primary | ICD-10-CM

## 2025-06-12 LAB
25(OH)D3 SERPL-MCNC: 36 NG/ML (ref 30–100)
ALBUMIN SERPL BCP-MCNC: 4.3 G/DL (ref 3.4–5)
ANION GAP SERPL CALC-SCNC: 14 MMOL/L (ref 10–20)
APPEARANCE UR: CLEAR
BILIRUB UR STRIP.AUTO-MCNC: NEGATIVE MG/DL
BUN SERPL-MCNC: 64 MG/DL (ref 6–23)
CALCIUM SERPL-MCNC: 9.9 MG/DL (ref 8.6–10.6)
CHLORIDE SERPL-SCNC: 97 MMOL/L (ref 98–107)
CO2 SERPL-SCNC: 32 MMOL/L (ref 21–32)
COLOR UR: ABNORMAL
CREAT SERPL-MCNC: 1.91 MG/DL (ref 0.5–1.3)
CREAT UR-MCNC: 93.2 MG/DL (ref 20–370)
EGFRCR SERPLBLD CKD-EPI 2021: 40 ML/MIN/1.73M*2
ERYTHROCYTE [DISTWIDTH] IN BLOOD BY AUTOMATED COUNT: 17.6 % (ref 11.5–14.5)
GLUCOSE SERPL-MCNC: 145 MG/DL (ref 74–99)
GLUCOSE UR STRIP.AUTO-MCNC: ABNORMAL MG/DL
HCT VFR BLD AUTO: 34.8 % (ref 41–52)
HGB BLD-MCNC: 10.7 G/DL (ref 13.5–17.5)
KETONES UR STRIP.AUTO-MCNC: NEGATIVE MG/DL
LEUKOCYTE ESTERASE UR QL STRIP.AUTO: NEGATIVE
MCH RBC QN AUTO: 22.8 PG (ref 26–34)
MCHC RBC AUTO-ENTMCNC: 30.7 G/DL (ref 32–36)
MCV RBC AUTO: 74 FL (ref 80–100)
MICROALBUMIN UR-MCNC: <7 MG/L
MICROALBUMIN/CREAT UR: NORMAL MG/G{CREAT}
NITRITE UR QL STRIP.AUTO: NEGATIVE
NRBC BLD-RTO: 0 /100 WBCS (ref 0–0)
PH UR STRIP.AUTO: 6.5 [PH]
PHOSPHATE SERPL-MCNC: 4.2 MG/DL (ref 2.5–4.9)
PLATELET # BLD AUTO: 292 X10*3/UL (ref 150–450)
POTASSIUM SERPL-SCNC: 3.9 MMOL/L (ref 3.5–5.3)
PROT UR STRIP.AUTO-MCNC: NEGATIVE MG/DL
RBC # BLD AUTO: 4.69 X10*6/UL (ref 4.5–5.9)
RBC # UR STRIP.AUTO: NEGATIVE MG/DL
SODIUM SERPL-SCNC: 139 MMOL/L (ref 136–145)
SP GR UR STRIP.AUTO: 1.01
UROBILINOGEN UR STRIP.AUTO-MCNC: NORMAL MG/DL
WBC # BLD AUTO: 8.6 X10*3/UL (ref 4.4–11.3)

## 2025-06-12 PROCEDURE — 80069 RENAL FUNCTION PANEL: CPT

## 2025-06-12 PROCEDURE — 85027 COMPLETE CBC AUTOMATED: CPT

## 2025-06-12 PROCEDURE — 82306 VITAMIN D 25 HYDROXY: CPT

## 2025-06-12 PROCEDURE — 36415 COLL VENOUS BLD VENIPUNCTURE: CPT

## 2025-06-12 PROCEDURE — 82043 UR ALBUMIN QUANTITATIVE: CPT

## 2025-06-12 PROCEDURE — 83970 ASSAY OF PARATHORMONE: CPT

## 2025-06-12 PROCEDURE — 81003 URINALYSIS AUTO W/O SCOPE: CPT

## 2025-06-12 PROCEDURE — 82570 ASSAY OF URINE CREATININE: CPT

## 2025-06-13 LAB — PTH-INTACT SERPL-MCNC: 109.5 PG/ML (ref 18.5–88)

## 2025-06-23 PROCEDURE — RXMED WILLOW AMBULATORY MEDICATION CHARGE

## 2025-06-25 ENCOUNTER — PHARMACY VISIT (OUTPATIENT)
Dept: PHARMACY | Facility: CLINIC | Age: 60
End: 2025-06-25
Payer: COMMERCIAL

## 2025-06-27 PROCEDURE — RXMED WILLOW AMBULATORY MEDICATION CHARGE

## 2025-06-28 DIAGNOSIS — I50.30 HEART FAILURE WITH PRESERVED EJECTION FRACTION, UNSPECIFIED HF CHRONICITY: ICD-10-CM

## 2025-06-30 PROCEDURE — RXMED WILLOW AMBULATORY MEDICATION CHARGE

## 2025-06-30 RX ORDER — BLOOD SUGAR DIAGNOSTIC
STRIP MISCELLANEOUS
Qty: 100 STRIP | Refills: 0 | Status: SHIPPED | OUTPATIENT
Start: 2025-06-30

## 2025-06-30 RX ORDER — LANCETS
EACH MISCELLANEOUS
Qty: 100 EACH | Refills: 0 | Status: SHIPPED | OUTPATIENT
Start: 2025-06-30

## 2025-07-01 ENCOUNTER — PHARMACY VISIT (OUTPATIENT)
Dept: PHARMACY | Facility: CLINIC | Age: 60
End: 2025-07-01
Payer: COMMERCIAL

## 2025-07-01 PROCEDURE — RXMED WILLOW AMBULATORY MEDICATION CHARGE

## 2025-07-02 ENCOUNTER — PHARMACY VISIT (OUTPATIENT)
Dept: PHARMACY | Facility: CLINIC | Age: 60
End: 2025-07-02
Payer: COMMERCIAL

## 2025-07-03 ENCOUNTER — PHARMACY VISIT (OUTPATIENT)
Dept: PHARMACY | Facility: CLINIC | Age: 60
End: 2025-07-03
Payer: COMMERCIAL

## 2025-07-03 PROCEDURE — RXMED WILLOW AMBULATORY MEDICATION CHARGE

## 2025-07-05 ENCOUNTER — PHARMACY VISIT (OUTPATIENT)
Dept: PHARMACY | Facility: CLINIC | Age: 60
End: 2025-07-05
Payer: COMMERCIAL

## 2025-07-07 ENCOUNTER — OFFICE VISIT (OUTPATIENT)
Dept: CARDIOLOGY | Facility: CLINIC | Age: 60
End: 2025-07-07
Payer: COMMERCIAL

## 2025-07-07 VITALS
WEIGHT: 315 LBS | SYSTOLIC BLOOD PRESSURE: 109 MMHG | HEART RATE: 73 BPM | BODY MASS INDEX: 45.1 KG/M2 | HEIGHT: 70 IN | DIASTOLIC BLOOD PRESSURE: 59 MMHG | OXYGEN SATURATION: 93 %

## 2025-07-07 DIAGNOSIS — I48.3 TYPICAL ATRIAL FLUTTER (MULTI): Primary | ICD-10-CM

## 2025-07-07 LAB
ATRIAL RATE: 74 BPM
PR INTERVAL: 200 MS
Q ONSET: 218 MS
QRS COUNT: 12 BEATS
QRS DURATION: 92 MS
QT INTERVAL: 398 MS
QTC CALCULATION(BAZETT): 441 MS
QTC FREDERICIA: 427 MS
R AXIS: 51 DEGREES
T AXIS: 47 DEGREES
T OFFSET: 417 MS
VENTRICULAR RATE: 74 BPM

## 2025-07-07 PROCEDURE — 3074F SYST BP LT 130 MM HG: CPT | Performed by: NURSE PRACTITIONER

## 2025-07-07 PROCEDURE — 3062F POS MACROALBUMINURIA REV: CPT | Performed by: NURSE PRACTITIONER

## 2025-07-07 PROCEDURE — 99212 OFFICE O/P EST SF 10 MIN: CPT

## 2025-07-07 PROCEDURE — 4010F ACE/ARB THERAPY RXD/TAKEN: CPT | Performed by: NURSE PRACTITIONER

## 2025-07-07 PROCEDURE — 99214 OFFICE O/P EST MOD 30 MIN: CPT | Performed by: NURSE PRACTITIONER

## 2025-07-07 PROCEDURE — 93005 ELECTROCARDIOGRAM TRACING: CPT | Performed by: NURSE PRACTITIONER

## 2025-07-07 PROCEDURE — 3008F BODY MASS INDEX DOCD: CPT | Performed by: NURSE PRACTITIONER

## 2025-07-07 PROCEDURE — 1036F TOBACCO NON-USER: CPT | Performed by: NURSE PRACTITIONER

## 2025-07-07 PROCEDURE — 3078F DIAST BP <80 MM HG: CPT | Performed by: NURSE PRACTITIONER

## 2025-07-07 ASSESSMENT — ENCOUNTER SYMPTOMS
NEAR-SYNCOPE: 0
DIAPHORESIS: 0
DIARRHEA: 0
SPUTUM PRODUCTION: 0
HEMOPTYSIS: 0
ORTHOPNEA: 0
MYALGIAS: 0
DIZZINESS: 0
NAUSEA: 0
DOUBLE VISION: 0
LIGHT-HEADEDNESS: 0
COUGH: 0
DEPRESSION: 0
ABDOMINAL PAIN: 0
SHORTNESS OF BREATH: 0
FALLS: 0
OCCASIONAL FEELINGS OF UNSTEADINESS: 0
SNORING: 0
DYSPNEA ON EXERTION: 0
FEVER: 0
WEAKNESS: 0
BLURRED VISION: 0
SORE THROAT: 0
IRREGULAR HEARTBEAT: 0
PND: 0
PALPITATIONS: 0
HEADACHES: 0
VOMITING: 0
LOSS OF SENSATION IN FEET: 0
SYNCOPE: 0

## 2025-07-07 ASSESSMENT — PAIN SCALES - GENERAL: PAINLEVEL_OUTOF10: 0-NO PAIN

## 2025-07-07 NOTE — PROGRESS NOTES
"Axel Caraballo is a 59 y.o. male.    Chief Complaint:  Atrial Flutter    59 year old male presents today for 1 month follow up post Atrial flutter ablation.  PMH of CKD, HTN, DM T2, HLD, PVD, RODDY, Obesity   CARDIAC HISTORY:  1. HFpEF   2. PACs     RELEVANT TESTING:  Transthoracic Echo (TTE) Complete 04/09/2025   1. Poorly visualized anatomical structures due to suboptimal image quality.   2. Left ventricular ejection fraction is normal, calculated by Dinh's biplane at 55%.   3. There is reduced right ventricular systolic function.   4. Suboptimal agitated saline contrast study did not identify any obvious intracardiac shunt.   5. Mildly elevated right ventricular systolic pressure.   6. Compared with study dated 2/25/2023, Both studies were technically difficult, however there has been a decrease in the RVS' and TAPSE consistent with reduced RV systolic function. Those parameters were normal on the prior exam. RVSP could not be estimated on the prior exam.    Now s/p CTI RFA with Dr. Stanton 6/3/2025  ECG 7/7/2025 NSR with PACs in [facility] HR 74 bpm    TODAY Patient presents today for 1 month follow up post Atrial Flutter ablation. He has been feeling great since the procedure and denies any further episodes of atrial flutter. He complains of  a nosebleed that lasted about 1 hour and he almost went to the ED.    /59 (BP Location: Right arm, Patient Position: Sitting)   Pulse 73   Ht 1.778 m (5' 10\")   Wt 148 kg (325 lb 8 oz)   SpO2 93%   BMI 46.70 kg/m²   Medications Ordered Prior to Encounter[1]      Review of Systems   Constitutional: Negative for diaphoresis, fever and malaise/fatigue.   HENT:  Negative for congestion and sore throat.    Eyes:  Negative for blurred vision and double vision.   Cardiovascular:  Negative for chest pain, dyspnea on exertion, irregular heartbeat, leg swelling, near-syncope, orthopnea, palpitations, paroxysmal nocturnal dyspnea and syncope.   Respiratory:  " Negative for cough, hemoptysis, shortness of breath, snoring and sputum production.    Hematologic/Lymphatic: Negative for bleeding problem.   Skin:  Negative for rash.   Musculoskeletal:  Negative for falls, joint pain and myalgias.   Gastrointestinal:  Negative for abdominal pain, diarrhea, nausea and vomiting.   Neurological:  Negative for dizziness, headaches, light-headedness and weakness.   All other systems reviewed and are negative.      Objective   Constitutional:       Appearance: Healthy appearance. Not in distress.   Eyes:      Conjunctiva/sclera: Conjunctivae normal.   HENT:      Nose: Nose normal.    Mouth/Throat:      Pharynx: Oropharynx is clear.   Neck:      Vascular: No JVR. JVD normal.   Pulmonary:      Effort: Pulmonary effort is normal.      Breath sounds: Normal breath sounds. No wheezing. No rhonchi.   Chest:      Chest wall: Not tender to palpatation.   Cardiovascular:      Normal rate. Regular rhythm.      Murmurs: There is no murmur.      No rub.   Pulses:     Intact distal pulses.   Edema:     Peripheral edema absent.   Abdominal:      General: Bowel sounds are normal.      Palpations: Abdomen is soft.   Musculoskeletal: Normal range of motion.      Cervical back: Neck supple. Skin:     General: Skin is warm and dry.      Comments: Right groin site well approximated, no bruising/bleeding/swelling/redness/pain   Neurological:      Mental Status: Alert and oriented to person, place and time.      Motor: Motor function is intact.         Lab Review:   Orders Only on 06/07/2025   Component Date Value    Color, Urine 06/12/2025 Light-Yellow     Appearance, Urine 06/12/2025 Clear     Specific Gravity, Urine 06/12/2025 1.015     pH, Urine 06/12/2025 6.5     Protein, Urine 06/12/2025 NEGATIVE     Glucose, Urine 06/12/2025 OVER (4+) (A)     Blood, Urine 06/12/2025 NEGATIVE     Ketones, Urine 06/12/2025 NEGATIVE     Bilirubin, Urine 06/12/2025 NEGATIVE     Urobilinogen, Urine 06/12/2025 Normal      Nitrite, Urine 06/12/2025 NEGATIVE     Leukocyte Esterase, Urine 06/12/2025 NEGATIVE     Albumin, Urine Random 06/12/2025 <7.0     Creatinine, Urine Random 06/12/2025 93.2     Albumin/Creatinine Ratio 06/12/2025      Parathyroid Hormone, Int* 06/12/2025 109.5 (H)     Vitamin D, 25-Hydroxy, T* 06/12/2025 36     WBC 06/12/2025 8.6     nRBC 06/12/2025 0.0     RBC 06/12/2025 4.69     Hemoglobin 06/12/2025 10.7 (L)     Hematocrit 06/12/2025 34.8 (L)     MCV 06/12/2025 74 (L)     MCH 06/12/2025 22.8 (L)     MCHC 06/12/2025 30.7 (L)     RDW 06/12/2025 17.6 (H)     Platelets 06/12/2025 292     Glucose 06/12/2025 145 (H)     Sodium 06/12/2025 139     Potassium 06/12/2025 3.9     Chloride 06/12/2025 97 (L)     Bicarbonate 06/12/2025 32     Anion Gap 06/12/2025 14     Urea Nitrogen 06/12/2025 64 (H)     Creatinine 06/12/2025 1.91 (H)     eGFR 06/12/2025 40 (L)     Calcium 06/12/2025 9.9     Phosphorus 06/12/2025 4.2     Albumin 06/12/2025 4.3    Admission on 06/03/2025, Discharged on 06/03/2025   Component Date Value    ABO TYPE 06/03/2025 O     Rh TYPE 06/03/2025 POS     ANTIBODY SCREEN 06/03/2025 NEG        Assessment/Plan   The encounter diagnosis was Typical atrial flutter (Multi).  Patient is 1 month post Atrial flutter ablation.  He is feeling great and has been working out the way he used to without any recurrence.  He is in bigeminy today with PACs, but feels great. Sometimes his heart rate registers as low, but he denies any symptoms.    CHADSVASC score is 4, guidelines would recommend indefinite AC.  Patient is more concerned about bleeding, he agreed to continue it twice a day for 3 months post ablation.  He may elect to get off the blood thinner despite the risk after that    Continue Eliquis and metoprolol  Follow up with me in 2-3 months       [1]   Current Outpatient Medications on File Prior to Visit   Medication Sig Dispense Refill    allopurinol (Zyloprim) 300 mg tablet Take 1 tablet (300 mg) by mouth once  daily. 90 tablet 3    amLODIPine (Norvasc) 2.5 mg tablet Take 1 tablet (2.5 mg) by mouth once daily. 30 tablet 5    apixaban (Eliquis) 5 mg tablet Take 1 tablet (5 mg) by mouth 2 times a day. 60 tablet 11    ascorbic acid (Vitamin C) 1,000 mg tablet Take 0.5 tablets (500 mg) by mouth once daily.      blood sugar diagnostic (Accu-Chek Guide test strips) Use to test blood sugar twice daily. 100 strip 0    bumetanide (Bumex) 2 mg tablet Take 1 tablet (2 mg) by mouth once daily. 90 tablet 3    calcitriol (Rocaltrol) 0.25 mcg capsule Take 1 capsule (0.25 mcg) by mouth 3 times a week. 36 capsule 3    chlorthalidone (Hygroton) 25 mg tablet Take 1 tablet by mouth daily 90 tablet 3    colchicine 0.6 mg tablet Take 1 tablet (0.6 mg) by mouth once daily as needed for gout flare. 10 tablet 1    dapagliflozin propanediol (Farxiga) 10 mg tablet TAKE 1 TABLET BY MOUTH EVERY MORNING 90 tablet 1    glyBURIDE (Diabeta) 5 mg tablet Take 2 tablets (10 mg) by mouth 2 times daily (morning and late afternoon). 360 tablet 1    hydrALAZINE (Apresoline) 50 mg tablet Take 1 tablet (50 mg) by mouth 2 times a day. 60 tablet 11    insulin degludec (Tresiba FlexTouch) 100 unit/mL (3 mL) pen Inject 38 Units under the skin once daily at bedtime. Take as directed per insulin instructions. 45 mL 1    isosorbide dinitrate (Isordil) 10 mg tablet Take 1 tablet (10 mg) by mouth once daily. 90 tablet 3    lancets (Accu-Chek Softclix Lancets) misc USE TO TEST BLOOD SUGAR TWICE DAILY 100 each 0    lisinopril 40 mg tablet TAKE 1 TABLET BY MOUTH ONCE DAILY 90 tablet 3    metoprolol succinate XL (Toprol-XL) 100 mg 24 hr tablet TAKE 1 TABLET BY MOUTH ONCE DAILY 90 tablet 3    multivitamin with minerals (DAILY VITAMIN FORMULA-MINERALS) tablet 1 tablet DAILY (route: oral)      pantoprazole (ProtoNix) 40 mg EC tablet Take 1 tablet (40 mg) by mouth once daily. (Patient taking differently: Take 1 tablet (40 mg) by mouth if needed.) 90 tablet 3    pen needle,  "diabetic (BD Karine 2nd Gen Pen Needle) 32 gauge x 5/32\" needle Use with insulin daily 100 each 1     No current facility-administered medications on file prior to visit.     "

## 2025-07-08 PROCEDURE — RXMED WILLOW AMBULATORY MEDICATION CHARGE

## 2025-07-10 ENCOUNTER — PHARMACY VISIT (OUTPATIENT)
Dept: PHARMACY | Facility: CLINIC | Age: 60
End: 2025-07-10
Payer: COMMERCIAL

## 2025-07-18 ENCOUNTER — PHARMACY VISIT (OUTPATIENT)
Dept: PHARMACY | Facility: CLINIC | Age: 60
End: 2025-07-18
Payer: COMMERCIAL

## 2025-07-18 PROCEDURE — RXMED WILLOW AMBULATORY MEDICATION CHARGE

## 2025-07-18 RX ORDER — AMOXICILLIN AND CLAVULANATE POTASSIUM 500; 125 MG/1; MG/1
1 TABLET, FILM COATED ORAL EVERY 12 HOURS
Qty: 20 TABLET | Refills: 0 | OUTPATIENT
Start: 2025-07-18

## 2025-07-21 ENCOUNTER — APPOINTMENT (OUTPATIENT)
Dept: ENDOCRINOLOGY | Facility: CLINIC | Age: 60
End: 2025-07-21
Payer: COMMERCIAL

## 2025-07-21 ENCOUNTER — OFFICE VISIT (OUTPATIENT)
Dept: PRIMARY CARE | Facility: CLINIC | Age: 60
End: 2025-07-21
Payer: COMMERCIAL

## 2025-07-21 VITALS
HEART RATE: 65 BPM | WEIGHT: 315 LBS | HEIGHT: 70 IN | BODY MASS INDEX: 45.1 KG/M2 | SYSTOLIC BLOOD PRESSURE: 106 MMHG | DIASTOLIC BLOOD PRESSURE: 65 MMHG | OXYGEN SATURATION: 95 %

## 2025-07-21 DIAGNOSIS — I10 HTN (HYPERTENSION), BENIGN: ICD-10-CM

## 2025-07-21 DIAGNOSIS — E78.5 HYPERLIPIDEMIA, UNSPECIFIED HYPERLIPIDEMIA TYPE: ICD-10-CM

## 2025-07-21 DIAGNOSIS — I50.9 CONGESTIVE HEART FAILURE, UNSPECIFIED HF CHRONICITY, UNSPECIFIED HEART FAILURE TYPE: ICD-10-CM

## 2025-07-21 DIAGNOSIS — I48.91 ATRIAL FIBRILLATION, UNSPECIFIED TYPE (MULTI): Primary | ICD-10-CM

## 2025-07-21 DIAGNOSIS — Z79.4 TYPE 2 DIABETES MELLITUS WITHOUT COMPLICATION, WITH LONG-TERM CURRENT USE OF INSULIN: ICD-10-CM

## 2025-07-21 DIAGNOSIS — E11.9 TYPE 2 DIABETES MELLITUS WITHOUT COMPLICATION, WITH LONG-TERM CURRENT USE OF INSULIN: ICD-10-CM

## 2025-07-21 DIAGNOSIS — G47.33 OSA (OBSTRUCTIVE SLEEP APNEA): ICD-10-CM

## 2025-07-21 DIAGNOSIS — N18.32 STAGE 3B CHRONIC KIDNEY DISEASE (MULTI): ICD-10-CM

## 2025-07-21 DIAGNOSIS — D64.9 ANEMIA, UNSPECIFIED TYPE: ICD-10-CM

## 2025-07-21 PROCEDURE — 3008F BODY MASS INDEX DOCD: CPT | Performed by: STUDENT IN AN ORGANIZED HEALTH CARE EDUCATION/TRAINING PROGRAM

## 2025-07-21 PROCEDURE — 4010F ACE/ARB THERAPY RXD/TAKEN: CPT | Performed by: STUDENT IN AN ORGANIZED HEALTH CARE EDUCATION/TRAINING PROGRAM

## 2025-07-21 PROCEDURE — 3074F SYST BP LT 130 MM HG: CPT | Performed by: STUDENT IN AN ORGANIZED HEALTH CARE EDUCATION/TRAINING PROGRAM

## 2025-07-21 PROCEDURE — 3078F DIAST BP <80 MM HG: CPT | Performed by: STUDENT IN AN ORGANIZED HEALTH CARE EDUCATION/TRAINING PROGRAM

## 2025-07-21 PROCEDURE — 99214 OFFICE O/P EST MOD 30 MIN: CPT | Performed by: STUDENT IN AN ORGANIZED HEALTH CARE EDUCATION/TRAINING PROGRAM

## 2025-07-21 ASSESSMENT — ENCOUNTER SYMPTOMS
LOSS OF SENSATION IN FEET: 0
DEPRESSION: 0
OCCASIONAL FEELINGS OF UNSTEADINESS: 0

## 2025-07-21 NOTE — PROGRESS NOTES
59-year-old male presenting for follow-up on multiple concerns.    HTN, CHF, A-fib  Stable, mostly tolerating current regimen well.  Following with cardiology/EP.  Status post ablation, feeling much better.     DMII  Stable, tolerates current regimen well.     CKD, anemia  Stable, follows with nephrology.      Gout  Stable, tolerating current regimen well.     Vitamin D deficiency  Stable, tolerating current regimen well.     RODDY  Reports compliance with CPAP.  He is pending getting new machine.    Left fifth toe infection  Following with podiatry out of system, states had superficial debridement, and is on antibiotics currently.    SocHx:   - Smoking: Quit over 20 years ago    12 point ROS reviewed and negative other than as stated in HPI      General: Alert, oriented, pleasant, in no acute distress  HEENT:      Head: normocephalic, atraumatic;      eyes: EOMI, no scleral icterus;   Neck: soft, supple, non-tender, no masses appreciated  CV: Heart with regular rate and rhythm, normal S1/S2, no murmurs  Lungs: CTAB without wheezing, rhonchi or rales; good respiratory effort, no increased work of breathing  Extremities: Left foot examined, superficial debridement about 5 mm diameter without any surrounding infection or exudate  Neuro: Cranial nerves grossly intact; alert and oriented  Psych: Appropriate mood and affect    #HTN #CHF #A-fib  - At goal in office, slightly soft, is getting some lightheadedness  - Currently on amlodipine 2.5 mg daily, chlorthalidone 25 mg daily, lisinopril 40 mg every day, hydralazine 25 mg twice daily, metoprolol succinate 100 mg daily, isosorbide dinitrate 10 mg twice daily  -Can discontinue amlodipine, for any other medication changes/de-escalation, I would advise he discusses with cardiology/nephrology  - Continue Eliquis 5 mg twice daily for 3 months post ablation per EP  - Continue to follow with cardiology and nephrology  -Repeat CMP    #DMII  -Last A1c 7.1, 03/2025  -Continue  Farxiga 10 mg daily, glipizide 5 mg BID  -Has discontinued Mounjaro due to GI side effects  -Follows with ophthalmology and podiatry  - Repeat A1c    #HLD  -Recommended rosuvastatin in the past, deferred, wanting to work on lifestyle modification  - Most recent lipid panel with LDL at 70     # CKD #Anemia  - Likely thalassemia versus anemia of chronic disease  - Continue to follow with nephrology     #Gout  - Continue allopurinol 300 mg daily, colchicine as needed, nephrotoxic warnings have been given     #Vitamin D deficiency  - Continue vitamin D supplements     #RODDY  - Continue with CPAP, reports compliance  - Order new PAP machine, patient did not want to get tachycardic information over the phone, plans to order his own machine    #GERD  - Continue pantoprazole 40 mg daily    #Hyperparathyroidism  -Following with nephrology    #L foot 5th digit infection  -Following with podiatry, currently on Augmentin, status post superficial debridement    F/U 4 months, sooner if needed, CPE in March    Chris D'Amico, DO

## 2025-08-11 DIAGNOSIS — I15.8 OTHER SECONDARY HYPERTENSION: ICD-10-CM

## 2025-08-11 DIAGNOSIS — I50.30 HEART FAILURE WITH PRESERVED EJECTION FRACTION, UNSPECIFIED HF CHRONICITY: ICD-10-CM

## 2025-08-11 PROCEDURE — RXMED WILLOW AMBULATORY MEDICATION CHARGE

## 2025-08-11 RX ORDER — AMOXICILLIN AND CLAVULANATE POTASSIUM 500; 125 MG/1; MG/1
1 TABLET, FILM COATED ORAL EVERY 12 HOURS
Qty: 20 TABLET | Refills: 0 | Status: CANCELLED | OUTPATIENT
Start: 2025-08-11

## 2025-08-12 ENCOUNTER — APPOINTMENT (OUTPATIENT)
Dept: GASTROENTEROLOGY | Facility: HOSPITAL | Age: 60
End: 2025-08-12
Payer: COMMERCIAL

## 2025-08-12 VITALS
SYSTOLIC BLOOD PRESSURE: 144 MMHG | TEMPERATURE: 97.2 F | OXYGEN SATURATION: 96 % | DIASTOLIC BLOOD PRESSURE: 76 MMHG | BODY MASS INDEX: 46.46 KG/M2 | WEIGHT: 315 LBS | HEART RATE: 50 BPM

## 2025-08-12 DIAGNOSIS — R19.7 DIARRHEA, UNSPECIFIED TYPE: Primary | ICD-10-CM

## 2025-08-12 PROCEDURE — 3078F DIAST BP <80 MM HG: CPT | Performed by: NURSE PRACTITIONER

## 2025-08-12 PROCEDURE — 99243 OFF/OP CNSLTJ NEW/EST LOW 30: CPT | Performed by: NURSE PRACTITIONER

## 2025-08-12 PROCEDURE — 4010F ACE/ARB THERAPY RXD/TAKEN: CPT | Performed by: NURSE PRACTITIONER

## 2025-08-12 PROCEDURE — 3077F SYST BP >= 140 MM HG: CPT | Performed by: NURSE PRACTITIONER

## 2025-08-12 PROCEDURE — 99212 OFFICE O/P EST SF 10 MIN: CPT | Performed by: NURSE PRACTITIONER

## 2025-08-12 RX ORDER — DAPAGLIFLOZIN 10 MG/1
10 TABLET, FILM COATED ORAL
Qty: 90 TABLET | Refills: 1 | Status: SHIPPED | OUTPATIENT
Start: 2025-08-12

## 2025-08-12 ASSESSMENT — ENCOUNTER SYMPTOMS
OCCASIONAL FEELINGS OF UNSTEADINESS: 0
LOSS OF SENSATION IN FEET: 0
DEPRESSION: 0

## 2025-08-12 ASSESSMENT — PAIN SCALES - GENERAL: PAINLEVEL_OUTOF10: 0-NO PAIN

## 2025-08-13 ENCOUNTER — PHARMACY VISIT (OUTPATIENT)
Dept: PHARMACY | Facility: CLINIC | Age: 60
End: 2025-08-13
Payer: COMMERCIAL

## 2025-08-13 RX ORDER — CALCITRIOL 0.25 UG/1
0.25 CAPSULE ORAL 3 TIMES WEEKLY
Qty: 36 CAPSULE | Refills: 3 | Status: SHIPPED | OUTPATIENT
Start: 2025-08-13 | End: 2026-08-13

## 2025-08-18 ENCOUNTER — HOSPITAL ENCOUNTER (OUTPATIENT)
Dept: GASTROENTEROLOGY | Facility: HOSPITAL | Age: 60
Discharge: HOME | End: 2025-08-18
Payer: COMMERCIAL

## 2025-08-18 ENCOUNTER — ANESTHESIA EVENT (OUTPATIENT)
Dept: GASTROENTEROLOGY | Facility: HOSPITAL | Age: 60
End: 2025-08-18
Payer: COMMERCIAL

## 2025-08-18 ENCOUNTER — ANESTHESIA (OUTPATIENT)
Dept: GASTROENTEROLOGY | Facility: HOSPITAL | Age: 60
End: 2025-08-18
Payer: COMMERCIAL

## 2025-08-18 VITALS
HEIGHT: 72 IN | WEIGHT: 315 LBS | TEMPERATURE: 96.8 F | DIASTOLIC BLOOD PRESSURE: 73 MMHG | RESPIRATION RATE: 22 BRPM | HEART RATE: 79 BPM | SYSTOLIC BLOOD PRESSURE: 142 MMHG | OXYGEN SATURATION: 99 % | BODY MASS INDEX: 42.66 KG/M2

## 2025-08-18 DIAGNOSIS — Z12.11 SCREENING FOR COLON CANCER: ICD-10-CM

## 2025-08-18 DIAGNOSIS — Z12.11 COLON CANCER SCREENING: Primary | ICD-10-CM

## 2025-08-18 PROCEDURE — RXMED WILLOW AMBULATORY MEDICATION CHARGE

## 2025-08-18 RX ORDER — MIDAZOLAM HYDROCHLORIDE 2 MG/2ML
INJECTION, SOLUTION INTRAMUSCULAR; INTRAVENOUS CONTINUOUS PRN
Status: DISCONTINUED | OUTPATIENT
Start: 2025-08-18 | End: 2025-08-18 | Stop reason: HOSPADM

## 2025-08-18 RX ORDER — POLYETHYLENE GLYCOL 3350, SODIUM SULFATE ANHYDROUS, SODIUM BICARBONATE, SODIUM CHLORIDE, POTASSIUM CHLORIDE 236; 22.74; 6.74; 5.86; 2.97 G/4L; G/4L; G/4L; G/4L; G/4L
4000 POWDER, FOR SOLUTION ORAL ONCE
Qty: 4000 ML | Refills: 0 | Status: SHIPPED | OUTPATIENT
Start: 2025-08-18 | End: 2025-08-21

## 2025-08-18 RX ORDER — PROPOFOL 10 MG/ML
INJECTION, EMULSION INTRAVENOUS CONTINUOUS PRN
Status: DISCONTINUED | OUTPATIENT
Start: 2025-08-18 | End: 2025-08-18 | Stop reason: HOSPADM

## 2025-08-18 ASSESSMENT — PAIN - FUNCTIONAL ASSESSMENT: PAIN_FUNCTIONAL_ASSESSMENT: 0-10

## 2025-08-18 ASSESSMENT — PAIN SCALES - GENERAL: PAINLEVEL_OUTOF10: 0 - NO PAIN

## 2025-08-20 ENCOUNTER — PHARMACY VISIT (OUTPATIENT)
Dept: PHARMACY | Facility: CLINIC | Age: 60
End: 2025-08-20
Payer: COMMERCIAL

## 2025-08-25 PROCEDURE — RXMED WILLOW AMBULATORY MEDICATION CHARGE

## 2025-08-27 ENCOUNTER — PHARMACY VISIT (OUTPATIENT)
Dept: PHARMACY | Facility: CLINIC | Age: 60
End: 2025-08-27
Payer: COMMERCIAL

## 2025-08-27 PROCEDURE — RXMED WILLOW AMBULATORY MEDICATION CHARGE

## 2025-08-29 ENCOUNTER — APPOINTMENT (OUTPATIENT)
Dept: SLEEP MEDICINE | Facility: CLINIC | Age: 60
End: 2025-08-29
Payer: COMMERCIAL

## 2025-09-02 ENCOUNTER — PHARMACY VISIT (OUTPATIENT)
Dept: PHARMACY | Facility: CLINIC | Age: 60
End: 2025-09-02
Payer: COMMERCIAL

## 2025-09-02 DIAGNOSIS — E11.9 TYPE 2 DIABETES MELLITUS WITHOUT COMPLICATION, WITH LONG-TERM CURRENT USE OF INSULIN: ICD-10-CM

## 2025-09-02 DIAGNOSIS — Z79.4 TYPE 2 DIABETES MELLITUS WITHOUT COMPLICATION, WITH LONG-TERM CURRENT USE OF INSULIN: ICD-10-CM

## 2025-09-02 DIAGNOSIS — M10.9 GOUT, UNSPECIFIED CAUSE, UNSPECIFIED CHRONICITY, UNSPECIFIED SITE: ICD-10-CM

## 2025-09-02 DIAGNOSIS — E11.65 INADEQUATELY CONTROLLED DIABETES MELLITUS (MULTI): ICD-10-CM

## 2025-09-02 PROCEDURE — RXMED WILLOW AMBULATORY MEDICATION CHARGE

## 2025-09-03 PROCEDURE — RXMED WILLOW AMBULATORY MEDICATION CHARGE

## 2025-09-03 RX ORDER — COLCHICINE 0.6 MG/1
0.6 TABLET ORAL DAILY PRN
Qty: 10 TABLET | Refills: 1 | Status: SHIPPED | OUTPATIENT
Start: 2025-09-03

## 2025-09-03 RX ORDER — GLYBURIDE 5 MG/1
10 TABLET ORAL
Qty: 360 TABLET | Refills: 1 | Status: SHIPPED | OUTPATIENT
Start: 2025-09-03

## 2025-09-03 RX ORDER — INSULIN DEGLUDEC 100 U/ML
38 INJECTION, SOLUTION SUBCUTANEOUS NIGHTLY
Qty: 45 ML | Refills: 1 | Status: SHIPPED | OUTPATIENT
Start: 2025-09-03 | End: 2026-09-03

## 2025-09-04 ENCOUNTER — PHARMACY VISIT (OUTPATIENT)
Dept: PHARMACY | Facility: CLINIC | Age: 60
End: 2025-09-04
Payer: COMMERCIAL

## 2025-09-08 ENCOUNTER — APPOINTMENT (OUTPATIENT)
Dept: ENDOCRINOLOGY | Facility: CLINIC | Age: 60
End: 2025-09-08
Payer: COMMERCIAL

## 2025-09-18 ENCOUNTER — APPOINTMENT (OUTPATIENT)
Dept: PRIMARY CARE | Facility: CLINIC | Age: 60
End: 2025-09-18
Payer: COMMERCIAL

## 2025-11-12 ENCOUNTER — APPOINTMENT (OUTPATIENT)
Dept: PRIMARY CARE | Facility: CLINIC | Age: 60
End: 2025-11-12
Payer: COMMERCIAL

## 2025-11-17 ENCOUNTER — APPOINTMENT (OUTPATIENT)
Dept: PRIMARY CARE | Facility: CLINIC | Age: 60
End: 2025-11-17
Payer: COMMERCIAL

## 2025-12-17 ENCOUNTER — APPOINTMENT (OUTPATIENT)
Dept: NEPHROLOGY | Facility: CLINIC | Age: 60
End: 2025-12-17
Payer: COMMERCIAL

## 2026-01-27 ENCOUNTER — APPOINTMENT (OUTPATIENT)
Dept: OPHTHALMOLOGY | Facility: CLINIC | Age: 61
End: 2026-01-27
Payer: COMMERCIAL

## 2026-01-28 ENCOUNTER — APPOINTMENT (OUTPATIENT)
Dept: OPHTHALMOLOGY | Facility: CLINIC | Age: 61
End: 2026-01-28
Payer: COMMERCIAL

## (undated) DEVICE — INTRODUCER, SHEATH, FAST-CATH, 8FR X 12CM, C-LOCK

## (undated) DEVICE — CATHETER, QUADRAPOLAR, 2-5-2MM, 115CM, YELLOW, W/ REDEL

## (undated) DEVICE — CABLE, CATH TO CARTO SYSTEM, 12 HYP/10 REDEL (REPROCESSED)

## (undated) DEVICE — CATHETHER, CS, BI-DIRECTIONAL, 10 POLES, D-F TYPE

## (undated) DEVICE — CLOSURE SYSTEM, VASCULAR, MVP 6-12FR, VENOUS

## (undated) DEVICE — CABLE, 34 HYP, 34 LEMO, 10FT, SMART TOUCH

## (undated) DEVICE — INTRODUCER, HEMO, FAST-CATH, 8.5 FR X 63 CM, SR0 038GW, G2

## (undated) DEVICE — CATHETER, DEFLECTABLE TIP, 2-5-2,, D TYPE

## (undated) DEVICE — TUBING SET, IRRIGATION, SMARTABLATE

## (undated) DEVICE — INTERFACE CABLE, EXISITING DX CATHETERS, BLUE PORT (REPROCESS)

## (undated) DEVICE — PATCHES, EXTERNAL REFERENCE, CARTO3

## (undated) DEVICE — CATHETER, THERMOCOOL SMART TOUCH, SF, D-F CURVE

## (undated) DEVICE — ELECTRODE, QUICK-COMBO, REDI PACK

## (undated) DEVICE — INTRODUCER, HEMOSTASIS, STR/J .038 IN, 8.5FR 12CM